# Patient Record
Sex: FEMALE | Race: BLACK OR AFRICAN AMERICAN | Employment: FULL TIME | URBAN - METROPOLITAN AREA
[De-identification: names, ages, dates, MRNs, and addresses within clinical notes are randomized per-mention and may not be internally consistent; named-entity substitution may affect disease eponyms.]

---

## 2017-01-12 ENCOUNTER — APPOINTMENT (OUTPATIENT)
Dept: GENERAL RADIOLOGY | Age: 47
End: 2017-01-12
Attending: EMERGENCY MEDICINE
Payer: COMMERCIAL

## 2017-01-12 ENCOUNTER — HOSPITAL ENCOUNTER (EMERGENCY)
Age: 47
Discharge: HOME | End: 2017-01-12
Attending: EMERGENCY MEDICINE
Payer: COMMERCIAL

## 2017-01-12 VITALS
TEMPERATURE: 98.3 F | HEART RATE: 89 BPM | DIASTOLIC BLOOD PRESSURE: 51 MMHG | BODY MASS INDEX: 31.83 KG/M2 | WEIGHT: 173 LBS | RESPIRATION RATE: 18 BRPM | OXYGEN SATURATION: 100 % | HEIGHT: 62 IN | SYSTOLIC BLOOD PRESSURE: 132 MMHG

## 2017-01-12 DIAGNOSIS — R05.8 COUGH PRODUCTIVE OF PURULENT SPUTUM: Primary | ICD-10-CM

## 2017-01-12 DIAGNOSIS — G80.9 CEREBRAL PALSY, UNSPECIFIED TYPE (HCC): ICD-10-CM

## 2017-01-12 PROCEDURE — 99282 EMERGENCY DEPT VISIT SF MDM: CPT

## 2017-01-12 PROCEDURE — 94640 AIRWAY INHALATION TREATMENT: CPT

## 2017-01-12 PROCEDURE — 71020 XR CHEST PA LAT: CPT

## 2017-01-12 RX ORDER — GUAIFENESIN 100 MG/5ML
200 SOLUTION ORAL
Qty: 118 ML | Refills: 0 | Status: SHIPPED | OUTPATIENT
Start: 2017-01-12 | End: 2017-06-27

## 2017-01-12 RX ORDER — ALBUTEROL SULFATE 90 UG/1
1-2 AEROSOL, METERED RESPIRATORY (INHALATION)
Qty: 1 INHALER | Refills: 1 | Status: SHIPPED | OUTPATIENT
Start: 2017-01-12

## 2017-01-12 RX ORDER — ALBUTEROL SULFATE 0.83 MG/ML
2.5 SOLUTION RESPIRATORY (INHALATION)
Status: DISCONTINUED | OUTPATIENT
Start: 2017-01-12 | End: 2017-01-12 | Stop reason: HOSPADM

## 2017-01-12 RX ORDER — AZITHROMYCIN 250 MG/1
TABLET, FILM COATED ORAL
Qty: 6 TAB | Refills: 0 | Status: SHIPPED | OUTPATIENT
Start: 2017-01-12 | End: 2017-01-17 | Stop reason: ALTCHOICE

## 2017-01-12 NOTE — ED NOTES
Pt c/o wheezing and + productive cough x 3 days SOB started  Yesterday. Non smoker and dx with bronchitis /asthma + use of inhaler. Pt is out of meds. Emergency Department Nursing Plan of Care       The Nursing Plan of Care is developed from the Nursing assessment and Emergency Department Attending provider initial evaluation. The plan of care may be reviewed in the ED Provider note.     The Plan of Care was developed with the following considerations:   Patient / Family readiness to learn indicated by:verbalized understanding  Persons(s) to be included in education: patient  Barriers to Learning/Limitations:No    Signed     Noel Ordonez RN    1/12/2017   8:00 AM          .

## 2017-01-12 NOTE — ED NOTES
Patient (s)  given copy of dc instructions and 0 paper script(s) and 3 electronic scripts. Patient (s)  verbalized understanding of instructions and script (s). Patient given a current medication reconciliation form and verbalized understanding of their medications. Patient (s) verbalized understanding of the importance of discussing medications with  his or her physician or clinic they will be following up with. Patient alert and oriented and in no acute distress. Patient discharged home ambulatory.

## 2017-01-12 NOTE — ED PROVIDER NOTES
HPI Comments: Darling Villatoro is a 42 y.o. female with history of asthma who presents ambulatory to ED c/o productive cough with yellow sputum for the past 3 days, along with associated wheezing, shortness of breath, and moderate rib pain secondary to cough and deep inspiration. Pt states she used her inhaler at 2:00 AM today, but she does not have a nebulizer machine. Pt additionally c/o mild headache. Pt denies any fever, chills, N/V/D, or sore throat. PCP:  [address] [place], [place]    There are no other complaints, changes or physical findings at this time. Written by Lakisha Piage. Juni Townsend ED Scribe, as dictated by Nasrin Manrique. Shanna Rosario MD.    The history is provided by the patient. No  was used.         Past Medical History:   Diagnosis Date    Asthma 4/29/2010    Bronchitis     CAD (coronary artery disease)      Sinus Tach    Cerebral palsy ([address] 4/29/2010    [facility]     Endocrine disease      Thyroid goiter    Goiter     Hypertension     Lumbar disc herniation 4/29/2010    Migraine     Neurological disorder      cerebral palsy    Other ill-defined conditions(799.89)      pleurisy    Other ill-defined conditions(799.89)      Migraines    Psychiatric disorder      anxiety    Sinus tachycardia 5/23/2014    Thyroid disease        Past Surgical History:   Procedure Laterality Date    Hx orthopaedic       carpal tunnel release    Hx orthopaedic       tendon repair R hand    Hx orthopaedic       pin in toe of R foot    Pr lap,tubal cautery      Hx heent       T & A    Hx gyn       Tubal ligation         Family History:   Problem Relation Age of Onset    Asthma Mother     Hypertension Mother     Diabetes Mother     Cancer Father      prostate    Asthma Daughter     Asthma Daughter     Stroke Maternal Grandfather     Cancer Paternal Grandmother      throat       Social History     Social History    Marital status: SINGLE     Spouse name: N/A   [facility] Number of children: N/A    Years of education: N/A     Occupational History    Not on file. Social History Main Topics    Smoking status: Never Smoker    Smokeless tobacco: Never Used    Alcohol use No    Drug use: No    Sexual activity: Yes     Partners: Male     Birth control/ protection: None     Other Topics Concern    Not on file     Social History Narrative         ALLERGIES: Pcn [penicillins]    Review of Systems   Constitutional: Negative for activity change, chills and fever. HENT: Negative for sore throat. Eyes: Negative for pain and redness. Respiratory: Positive for cough, shortness of breath and wheezing. Negative for chest tightness. Cardiovascular: Negative for chest pain and palpitations. Gastrointestinal: Negative for abdominal pain, diarrhea, nausea and vomiting. Genitourinary: Negative for dysuria, frequency and urgency. Musculoskeletal: Negative for back pain and neck pain. Skin: Negative for rash. Neurological: Positive for headaches. Negative for syncope and light-headedness. Psychiatric/Behavioral: Negative for confusion. All other systems reviewed and are negative. Vitals:    01/12/17 0802   BP: 125/86   Pulse: 88   Resp: 20   Temp: 98.4 °F (36.9 °C)   SpO2: 100%   Weight: 78.5 kg (173 lb)   Height: 5' 1.5\" (1.562 m)            Physical Exam   Nursing note and vitals reviewed.    Physical Examination: General appearance - WDWN, in no apparent distress  Head - NC/AT  Eyes - pupils equal, round  and reactive, extraocular eye movements intact, conj/sclera clear, anicteric  Mouth - mucous membranes moist, pharynx normal without lesions  Nose/Ears - nares clear, Tms & canals clear  Neck - supple, no significant adenopathy, trachea midline, no crepitus, c spine diffusely non-tender, no step offs  Chest - Occasional cough, poor air movement   Heart - normal rate and regular rhythm, S1 and S2 normal, no murmurs, gallops, or rubs  Abdomen - soft, nontender, nondistended, nabs, no masses, guarding, rebound or rigidity  Neurological - chronic contracture of right upper extremity , alert, oriented, normal speech, cranial nerves intact, no focal motor findings, motor & sensory diffusely intact, normal gait  Extremities/MS - peripheral pulses normal, no pedal edema, all joints atraumatic, FROM, non-tender, no gross deformities, spine diffusely non-tender  Skin - normal coloration and turgor, no rashes, no lesions or lacerations      MDM  Number of Diagnoses or Management Options  Diagnosis management comments: DDx: pneumonia, asthma, bronchitis        Amount and/or Complexity of Data Reviewed  Tests in the radiology section of CPT®: reviewed and ordered  Review and summarize past medical records: yes    Patient Progress  Patient progress: improved    ED Course       Procedures    Progress Note  9:35 AM    Kika hSah MD has re-evaluated pt, and pt's air movement has improved after breathing treatment. IMAGING RESULTS:  CXR Results  (Last 48 hours)               01/12/17 0929  XR CHEST PA LAT Final result    Impression:  Impression:   No acute cardiopulmonary process. Narrative:  Chest PA and lateral       History: Cough. Wheezing. Chest pain and cough       Comparison: 1/6/2015       Findings: The lungs are well expanded. No focal consolidation, pleural   effusion, or pneumothorax. The cardiomediastinal silhouette is unremarkable. The visualized osseous structures are unremarkable. MEDICATIONS GIVEN:  Medications   albuterol (PROVENTIL VENTOLIN) nebulizer solution 2.5 mg (not administered)       IMPRESSION:  1. Cough productive of purulent sputum    2. [facility]        PLAN:  1.    Current Discharge Medication List      START taking these medications    Details   azithromycin (ZITHROMAX Z-ANGIE) 250 mg tablet 5 day pack  Qty: 6 Tab, Refills: 0      guaiFENesin (ROBITUSSIN) 100 mg/5 mL liquid Take 10 mL by mouth three (3) times daily as needed for Cough. Qty: 118 mL, Refills: 0         CONTINUE these medications which have CHANGED    Details   albuterol (PROVENTIL HFA, VENTOLIN HFA, PROAIR HFA) 90 mcg/actuation inhaler Take 1-2 Puffs by inhalation every four (4) hours as needed for Wheezing. Qty: 1 Inhaler, Refills: 1         CONTINUE these medications which have NOT CHANGED    Details   baclofen (LIORESAL) 10 mg tablet Take 1 tablet by mouth daily as needed for Pain. Qty: 30 tablet, Refills: 0    Associated Diagnoses: Medication refill      gabapentin (NEURONTIN) 400 mg capsule Take 1 capsule by mouth three (3) times daily. Qty: 90 capsule, Refills: 0    Associated Diagnoses: Medication refill      topiramate (TOPAMAX) 50 mg tablet Take 1 tablet by mouth daily. Qty: 30 tablet, Refills: 3    Associated Diagnoses: Medication refill      metoprolol (LOPRESSOR) 25 mg tablet 1/2 tab twice a day  Qty: 30 Tab, Refills: 1    Associated Diagnoses: Sinus tachycardia; Medication refill      ranitidine (ZANTAC) 150 mg tablet Take 1 Tab by mouth two (2) times a day. Qty: 60 Tab, Refills: 1      Ferrous Sulfate (FLOW FE) 47.5 mg iron TbER tablet Take 1 Tab by mouth daily. Take with vitamin C  Qty: 30 Tab, Refills: 1      naproxen (NAPROSYN) 500 mg tablet Take 1 tablet by mouth two (2) times daily (with meals). Qty: 60 tablet, Refills: 0      norethindrone (MICRONOR) 0.35 mg tablet Take 1 tablet by mouth daily. Qty: 1 Package, Refills: 12    Associated Diagnoses: Medication refill      ALPRAZolam (XANAX) 0.5 mg tablet Take 1 tablet by mouth daily as needed for Anxiety. Qty: 30 tablet, Refills: 0    Associated Diagnoses: Anxiety      Nebulizer & Compressor machine Use as directed  Qty: 1 each, Refills: 0      Nebulizer Accessories kit Mask and tubing  Qty: 1 kit, Refills: 0      predniSONE (STERAPRED DS) 10 mg dose pack Take 1 tablet by mouth See Admin Instructions.  See administration instruction per 10mg dose pack  Qty: 21 tablet, Refills: 0      SUMAtriptan (IMITREX) 100 mg tablet Take 1 Tab by mouth once as needed for Migraine. Qty: 10 Tab, Refills: 4    Associated Diagnoses: Migraine headache           2. Follow-up Information     Follow up With Details Comments MD Nereida Schedule an appointment as soon as possible for a visit As needed [id number] [id number] [facility] EMERGENCY DEPT  As needed, If symptoms worsen [facility]  [phone]        Return to ED if worse       DISCHARGE NOTE  9:38 AM  The patient has been re-evaluated and is ready for discharge. Reviewed available results with patient. Counseled pt on diagnosis and care plan. Pt has expressed understanding, and all questions have been answered. Pt agrees with plan and agrees to follow up as recommended, or return to the ED if their symptoms worsen. Discharge instructions have been provided and explained to the pt, along with reasons to return to the ED. Attestations: This note is prepared by Conchis Nieto. Carmelita Caldera, acting as Scribe for [address]. [place] [id number]. Lee Sanders MD: The scribe's documentation has been prepared under my direction and personally reviewed by me in its entirety. I confirm that the note above accurately reflects all work, treatment, procedures, and medical decision making performed by me.

## 2017-01-12 NOTE — LETTER
[facility] EMERGENCY DEPT 
[address] [place] [address] [address] [id number] [phone] Work/School Note Date: 1/12/2017 To Whom It May concern: 
 
Baron Cadena was seen and treated today in the emergency room by the following provider(s): 
Attending Provider: Orlando Zuñiga MD. Baron Cadena may return to work on 1/14/17. Sincerely, Kika Chiang MD

## 2017-01-12 NOTE — DISCHARGE INSTRUCTIONS
Cough: Care Instructions  Your Care Instructions  A cough is your body's response to something that bothers your throat or airways. Many things can cause a cough. You might cough because of a cold or the flu, bronchitis, or asthma. Smoking, postnasal drip, allergies, and stomach acid that backs up into your throat also can cause coughs. A cough is a symptom, not a disease. Most coughs stop when the cause, such as a cold, goes away. You can take a few steps at home to cough less and feel better. Follow-up care is a key part of your treatment and safety. Be sure to make and go to all appointments, and call your doctor if you are having problems. It's also a good idea to know your test results and keep a list of the medicines you take. How can you care for yourself at home? · Drink lots of water and other fluids. This helps thin the mucus and soothes a dry or sore throat. Honey or lemon juice in hot water or tea may ease a dry cough. · Take cough medicine as directed by your doctor. · Prop up your head on pillows to help you breathe and ease a dry cough. · Try cough drops to soothe a dry or sore throat. Cough drops don't stop a cough. Medicine-flavored cough drops are no better than candy-flavored drops or hard candy. · Do not smoke. Avoid secondhand smoke. If you need help quitting, talk to your doctor about stop-smoking programs and medicines. These can increase your chances of quitting for good. When should you call for help? Call 911 anytime you think you may need emergency care. For example, call if:  · You have severe trouble breathing. Call your doctor now or seek immediate medical care if:  · You cough up blood. · You have new or worse trouble breathing. · You have a new or higher fever. · You have a new rash.   Watch closely for changes in your health, and be sure to contact your doctor if:  · You cough more deeply or more often, especially if you notice more mucus or a change in the color of your mucus. · You have new symptoms, such as a sore throat, an earache, or sinus pain. · You do not get better as expected. Where can you learn more? Go to http://fern-lidia.info/. Enter D279 in the search box to learn more about \"Cough: Care Instructions. \"  Current as of: May 27, 2016  Content Version: 11.1  © 2006-2016 [employer]. Care instructions adapted under license by [employer] (which disclaims liability or warranty for this information). If you have questions about a medical condition or this instruction, always ask your healthcare professional. [place] [address] any warranty or liability for your use of this information. Bronchitis: Care Instructions  Your Care Instructions    Bronchitis is inflammation of the bronchial tubes, which carry air to the lungs. The tubes swell and produce mucus, or phlegm. The mucus and inflamed bronchial tubes make you cough. You may have trouble breathing. Most cases of bronchitis are caused by viruses like those that cause colds. Antibiotics usually do not help and they may be harmful. Bronchitis usually develops rapidly and lasts about 2 to 3 weeks in otherwise healthy people. Follow-up care is a key part of your treatment and safety. Be sure to make and go to all appointments, and call your doctor if you are having problems. It's also a good idea to know your test results and keep a list of the medicines you take. How can you care for yourself at home? · Take all medicines exactly as prescribed. Call your doctor if you think you are having a problem with your medicine. · Get some extra rest.  · Take an over-the-counter pain medicine, such as acetaminophen (Tylenol), ibuprofen (Advil, Motrin), or naproxen (Aleve) to reduce fever and relieve body aches. Read and follow all instructions on the label. · Do not take two or more pain medicines at the same time unless the doctor told you to.  Many pain medicines have acetaminophen, which is Tylenol. Too much acetaminophen (Tylenol) can be harmful. · Take an over-the-counter cough medicine that contains dextromethorphan to help quiet a dry, hacking cough so that you can sleep. Avoid cough medicines that have more than one active ingredient. Read and follow all instructions on the label. · Breathe moist air from a humidifier, hot shower, or sink filled with hot water. The heat and moisture will thin mucus so you can cough it out. · Do not smoke. Smoking can make bronchitis worse. If you need help quitting, talk to your doctor about stop-smoking programs and medicines. These can increase your chances of quitting for good. When should you call for help? Call 911 anytime you think you may need emergency care. For example, call if:  · You have severe trouble breathing. Call your doctor now or seek immediate medical care if:  · You have new or worse trouble breathing. · You cough up dark brown or bloody mucus (sputum). · You have a new or higher fever. · You have a new rash. Watch closely for changes in your health, and be sure to contact your doctor if:  · You cough more deeply or more often, especially if you notice more mucus or a change in the color of your mucus. · You are not getting better as expected. Where can you learn more? Go to http://fern-lidia.info/. Enter H333 in the search box to learn more about \"Bronchitis: Care Instructions. \"  Current as of: May 23, 2016  Content Version: 11.1  © 2451-5485 [employer]. Care instructions adapted under license by [employer] (which disclaims liability or warranty for this information). If you have questions about a medical condition or this instruction, always ask your healthcare professional. [address] any warranty or liability for your use of this information.

## 2017-01-13 ENCOUNTER — PATIENT OUTREACH (OUTPATIENT)
Dept: INTERNAL MEDICINE CLINIC | Age: 47
End: 2017-01-13

## 2017-01-13 NOTE — PROGRESS NOTES
Patient listed on discharge [facility]) report on 2017. Patient discharged from [facility] ED for Productive Cough. Contacted patient to perform post ED discharge assessment. Verified  and address with patient as identifiers. Provided introduction to self, and explanation of the Panel Manager role. Performed medication reconciliation with patient, and patient verbalizes understanding of administration of home medications. Reviewed discharge instructions with patient. Patient verbalizes understand of discharge instructions and follow-up care. Patient scheduled to f/u with Dr. Aamir Ramírez on 2017 @ 3:45 PM.  Reviewed red flags with patient, and patient verbalizes understanding. Patient given an opportunity to ask questions. No other clinical/social/functional needs noted. The patient agrees to contact the PCP office for questions related to her healthcare. The patient expressed thanks, offered no additional questions and ended the call. Red Flags:Increased SOB, Increased Coughing  Spoke with patient and she stated she was doing better. Patient stated she still has a productive cough with thick yellow phlegm. Patient c/o diarrhea due to Z-pack, she has occasional SOB. Patient is self care but has a supportive family. Patient does not monitor B/P at home. Patient tolerating regular diet with occasional added sodium after cooking. Patient has a Neuro appointment scheduled for 2014 @ [facility] [place] [facility]. Patient informed she will be called again in 2-3 weeks. Will continue to follow.

## 2017-01-17 ENCOUNTER — OFFICE VISIT (OUTPATIENT)
Dept: INTERNAL MEDICINE CLINIC | Age: 47
End: 2017-01-17

## 2017-01-17 VITALS
DIASTOLIC BLOOD PRESSURE: 65 MMHG | OXYGEN SATURATION: 100 % | RESPIRATION RATE: 16 BRPM | HEIGHT: 62 IN | WEIGHT: 172.2 LBS | TEMPERATURE: 96.9 F | BODY MASS INDEX: 31.69 KG/M2 | HEART RATE: 83 BPM | SYSTOLIC BLOOD PRESSURE: 123 MMHG

## 2017-01-17 DIAGNOSIS — R20.2 NUMBNESS AND TINGLING OF BOTH LEGS: ICD-10-CM

## 2017-01-17 DIAGNOSIS — J45.21 MILD INTERMITTENT ASTHMA WITH ACUTE EXACERBATION: Primary | ICD-10-CM

## 2017-01-17 DIAGNOSIS — R07.89 CHEST DISCOMFORT: ICD-10-CM

## 2017-01-17 DIAGNOSIS — J40 BRONCHITIS: ICD-10-CM

## 2017-01-17 DIAGNOSIS — Z76.0 MEDICATION REFILL: ICD-10-CM

## 2017-01-17 DIAGNOSIS — R20.0 NUMBNESS AND TINGLING OF BOTH LEGS: ICD-10-CM

## 2017-01-17 DIAGNOSIS — R05.8 COUGH PRODUCTIVE OF CLEAR SPUTUM: ICD-10-CM

## 2017-01-17 RX ORDER — PREDNISONE 10 MG/1
10 TABLET ORAL SEE ADMIN INSTRUCTIONS
Qty: 21 TAB | Refills: 0 | Status: SHIPPED | OUTPATIENT
Start: 2017-01-17 | End: 2017-02-08 | Stop reason: ALTCHOICE

## 2017-01-17 RX ORDER — ALBUTEROL SULFATE 0.83 MG/ML
2.5 SOLUTION RESPIRATORY (INHALATION) ONCE
Qty: 1 EACH | Refills: 0
Start: 2017-01-17 | End: 2017-01-17

## 2017-01-17 RX ORDER — ALBUTEROL SULFATE 0.83 MG/ML
2.5 SOLUTION RESPIRATORY (INHALATION)
Qty: 50 EACH | Refills: 0
Start: 2017-01-17 | End: 2017-02-21 | Stop reason: SDUPTHER

## 2017-01-17 RX ORDER — METOPROLOL TARTRATE 25 MG/1
TABLET, FILM COATED ORAL
Qty: 30 TAB | Refills: 1 | Status: SHIPPED | OUTPATIENT
Start: 2017-01-17 | End: 2017-02-08 | Stop reason: SDUPTHER

## 2017-01-17 RX ORDER — BACLOFEN 10 MG/1
10 TABLET ORAL
Qty: 30 TAB | Refills: 0 | Status: SHIPPED | OUTPATIENT
Start: 2017-01-17 | End: 2017-02-08 | Stop reason: SDUPTHER

## 2017-01-17 RX ORDER — TOPIRAMATE 50 MG/1
50 TABLET, FILM COATED ORAL DAILY
Qty: 30 TAB | Refills: 3 | Status: SHIPPED | OUTPATIENT
Start: 2017-01-17

## 2017-01-17 RX ORDER — SUMATRIPTAN 100 MG/1
100 TABLET, FILM COATED ORAL
Qty: 10 TAB | Refills: 4 | Status: SHIPPED | OUTPATIENT
Start: 2017-01-17

## 2017-01-17 RX ORDER — NEBULIZER AND COMPRESSOR
EACH MISCELLANEOUS
Qty: 1 EACH | Refills: 0 | Status: SHIPPED | OUTPATIENT
Start: 2017-01-17

## 2017-01-17 NOTE — PROGRESS NOTES
Chief Complaint   Patient presents with   [facility] Follow Up     follow up from [facility] [place] for bronchitis. Subjective:   Justin Burgos 42 y.o.  female with a  past medical history reviewed see below. Here for a flu vaccine she was treated with z pac for bronchitis she was also given an inhlaer   She has had some peripheral neuropathy dx at the ER an had thyroids labs done  And had an repeat U/S    ROS: otherwise feeling generally well. All other systems reviewed and are negative      Current Outpatient Prescriptions   Medication Sig Dispense Refill    albuterol (PROVENTIL HFA, VENTOLIN HFA, PROAIR HFA) 90 mcg/actuation inhaler Take 1-2 Puffs by inhalation every four (4) hours as needed for Wheezing. 1 Inhaler 1    guaiFENesin (ROBITUSSIN) 100 mg/5 mL liquid Take 10 mL by mouth three (3) times daily as needed for Cough. 118 mL 0    metoprolol (LOPRESSOR) 25 mg tablet 1/2 tab twice a day 30 Tab 1    ranitidine (ZANTAC) 150 mg tablet Take 1 Tab by mouth two (2) times a day. 60 Tab 1    naproxen (NAPROSYN) 500 mg tablet Take 1 tablet by mouth two (2) times daily (with meals). 60 tablet 0    baclofen (LIORESAL) 10 mg tablet Take 1 tablet by mouth daily as needed for Pain. 30 tablet 0    gabapentin (NEURONTIN) 400 mg capsule Take 1 capsule by mouth three (3) times daily. 90 capsule 0    topiramate (TOPAMAX) 50 mg tablet Take 1 tablet by mouth daily. 30 tablet 3    Nebulizer & Compressor machine Use as directed 1 each 0    Nebulizer Accessories kit Mask and tubing 1 kit 0    Ferrous Sulfate (FLOW FE) 47.5 mg iron TbER tablet Take 1 Tab by mouth daily. Take with vitamin C 30 Tab 1    norethindrone (MICRONOR) 0.35 mg tablet Take 1 tablet by mouth daily. 1 Package 12    ALPRAZolam (XANAX) 0.5 mg tablet Take 1 tablet by mouth daily as needed for Anxiety. 30 tablet 0    SUMAtriptan (IMITREX) 100 mg tablet Take 1 Tab by mouth once as needed for Migraine.  10 Tab 4     Allergies   Allergen Reactions  Pcn [Penicillins] Hives     Swelling in hands      Past Medical History   Diagnosis Date    Asthma 4/29/2010    Bronchitis     CAD (coronary artery disease)      Sinus Tach    Cerebral palsy ([address] 4/29/2010    CP [facility]     Endocrine disease      Thyroid goiter    Goiter     Hypertension     Lumbar disc herniation 4/29/2010    Migraine     Neurological disorder      cerebral palsy    Other ill-defined conditions(799.89)      pleurisy    Other ill-defined conditions(799.89)      Migraines    Psychiatric disorder      anxiety    Sinus tachycardia 5/23/2014    Thyroid disease      Past Surgical History   Procedure Laterality Date    Hx orthopaedic       carpal tunnel release    Hx orthopaedic       tendon repair R hand    Hx orthopaedic       pin in toe of R foot    Pr lap,tubal cautery      Hx heent       T & A    Hx gyn       Tubal ligation, total hyterectomy     Family History   Problem Relation Age of Onset    Asthma Mother     Hypertension Mother     Diabetes Mother     Cancer Father      prostate    Asthma Daughter     Asthma Daughter     Stroke Maternal Grandfather     Cancer Paternal Grandmother      throat     Social History   Substance Use Topics    Smoking status: Never Smoker    Smokeless tobacco: Never Used    Alcohol use No          Objective:     Visit Vitals    /65 (BP 1 Location: Left arm, BP Patient Position: At rest)    Pulse 83    Temp 96.9 °F (36.1 °C) (Oral)    Resp 16    Ht 5' 1.5\" (1.562 m)    Wt 172 lb 3.2 oz (78.1 kg)    LMP 02/20/2015    SpO2 100%    BMI 32.01 kg/m2     Gen: NAD, pleasant  HEENT: normal appearing head, nares patent, PERRLA, EOMI, oropharynx no erythema, no cervical lymphadenopathy neck supple   Cardio: RRR nl S1S2 no murmur  Lungs no secondary muscle use + faint wheeze after neb improved  no rales no rhonchi  ABD Soft non tender non distended + bowel sounds  Extremities: full ROM X 4 no clubbing no cyanosis  Neuro: no gross focal deficits noted, alert and orientated X 3  Psych.: well groomed no outward signs of depression. Assessment/Plan:   Wyatt Nyhan was seen today for hospital follow up. Diagnoses and all orders for this visit:    Mild intermittent asthma with acute exacerbation    Bronchitis  -     albuterol (PROVENTIL VENTOLIN) 2.5 mg /3 mL (0.083 %) nebulizer solution; 3 mL by Nebulization route once for 1 dose. -     Cancel: ALBUTEROL, INHAL. ALL.()  -     Cancel: INHAL RX, AIRWAY OBST/DX SPUTUM INDUCT ([id number]); Future  -     albuterol (PROVENTIL VENTOLIN) 2.5 mg /3 mL (0.083 %) nebulizer solution; 3 mL by Nebulization route once for 1 dose. -     ALBUTEROL, INHAL. JOSE L.()  -     INHAL RX, AIRWAY OBST/DX SPUTUM INDUCT ([id number])    Medication refill  -     metoprolol tartrate (LOPRESSOR) 25 mg tablet; 1/2 tab twice a day  -     baclofen (LIORESAL) 10 mg tablet; Take 1 Tab by mouth daily as needed for Pain. -     topiramate (TOPAMAX) 50 mg tablet; Take 1 Tab by mouth daily. Cough productive of clear sputum  -     albuterol (PROVENTIL VENTOLIN) 2.5 mg /3 mL (0.083 %) nebulizer solution; 3 mL by Nebulization route once for 1 dose. -     Cancel: ALBUTEROL, INHAL. HLX.()  -     Cancel: INHAL RX, AIRWAY OBST/DX SPUTUM INDUCT ([id number]); Future    Chest discomfort  -     albuterol (PROVENTIL VENTOLIN) 2.5 mg /3 mL (0.083 %) nebulizer solution; 3 mL by Nebulization route once for 1 dose. -     Cancel: ALBUTEROL, INHAL. QMO.()  -     Cancel: INHAL RX, AIRWAY OBST/DX SPUTUM INDUCT ([id number]); Future    Numbness and tingling of both legs  -     HEMOGLOBIN A1C WITH EAG; Future  -     METABOLIC PANEL, COMPREHENSIVE; Future  -     CBC WITH AUTOMATED DIFF; Future  -     VITAMIN D, 25 HYDROXY; Future  -     LIPID PANEL; Future  -     URIC ACID; Future    Other orders  -     SUMAtriptan (IMITREX) 100 mg tablet;  Take 1 Tab by mouth once as needed for Migraine.  -     albuterol sulfate (Tereza Jeronimo) 80 mcg/actuation aepb; Take 2 Puffs by inhalation every four (4) hours. -     Nebulizer & Compressor machine; Mask and tubing  -     albuterol (PROVENTIL VENTOLIN) 2.5 mg /3 mL (0.083 %) nebulizer solution; 3 mL by Nebulization route every four (4) hours as needed for Wheezing.  -     predniSONE (STERAPRED DS) 10 mg dose pack; Take 1 Tab by mouth See Admin Instructions. See administration instruction per 10mg dose pack      Follow-up Disposition:  Return in about 3 weeks (around 2/7/2017) for review labs and f/up flu shot? ..  avs printed and given to the pt. .  predinsone for cough and will oerder nebulizer as well pt to return with thryoid labs  nd will get labs on lab [facility] as our lab is closed   The patient voiced understanding of the above. Medication side effects were reviewed with the patient. Call with any concerns.

## 2017-01-17 NOTE — PROGRESS NOTES
1. Have you been to the ER, urgent care clinic since your last visit? Hospitalized since your last visit? See below    2. Have you seen or consulted any other health care providers outside of the [address] [place] [address] since your last visit? Include any pap smears or colon screening. No     Chief Complaint   Patient presents with   [facility] Follow Up     follow up from [facility] [place] [facility] [place] for bronchitis. Not fasting    Was diagnosed with peripheral neuropathy back in September at [facility].

## 2017-01-17 NOTE — MR AVS SNAPSHOT
Visit Information Date & Time Provider Department Dept. Phone Encounter #  
 1/17/2017  3:45  [facility] [place], [facility] [phone] [id number] Follow-up Instructions Return in about 3 weeks (around 2/7/2017) for review labs and f/up flu shot? [place] Upcoming Health Maintenance Date Due Pneumococcal 19-64 Medium Risk (1 of 1 - PPSV23) 12/30/1991 DTaP/Tdap/Td series (1 - Tdap) 12/30/1993 PAP AKA CERVICAL CYTOLOGY 7/22/2017 Allergies as of 1/17/2017  Review Complete On: 1/17/2017 By: Timmy Tao LPN Severity Noted Reaction Type Reactions Pcn [Penicillins]  04/29/2010    Hives Swelling in hands Current Immunizations  Reviewed on 10/3/2014 Name Date Influenza Vaccine 10/3/2014 Not reviewed this visit You Were Diagnosed With   
  
 Codes Comments Mild intermittent asthma with acute exacerbation    -  Primary ICD-10-CM: J45.21 ICD-9-CM: 812.07 Bronchitis     ICD-10-CM: J40 ICD-9-CM: 537 Medication refill     ICD-10-CM: Z76.0 ICD-9-CM: V68.1 Cough productive of clear sputum     ICD-10-CM: R05 ICD-9-CM: 004. 2 Chest discomfort     ICD-10-CM: R07.89 ICD-9-CM: 786.59 Numbness and tingling of both legs     ICD-10-CM: R20.2 ICD-9-CM: 801. 0 Vitals BP Pulse Temp Resp Height(growth percentile) Weight(growth percentile) 123/65 (BP 1 Location: Left arm, BP Patient Position: At rest) 83 96.9 °F (36.1 °C) (Oral) 16 5' 1.5\" (1.562 m) 172 lb 3.2 oz (78.1 kg) LMP SpO2 BMI OB Status Smoking Status 02/20/2015 100% 32.01 kg/m2 Hysterectomy Never Smoker BMI and BSA Data Body Mass Index Body Surface Area 32.01 kg/m 2 1.84 m 2 Preferred Pharmacy Pharmacy Name [email or web address], [address] [phone] Your Updated Medication List  
  
   
 This list is accurate as of: 1/17/17  4:29 PM.  Always use your most recent med list.  
  
  
  
  
 * albuterol 90 mcg/actuation inhaler Commonly known as:  PROVENTIL HFA, VENTOLIN HFA, PROAIR HFA Take 1-2 Puffs by inhalation every four (4) hours as needed for Wheezing. * albuterol sulfate 90 mcg/actuation Aepb Commonly known as:  Gerold Ariana Take 2 Puffs by inhalation every four (4) hours. * albuterol 2.5 mg /3 mL (0.083 %) nebulizer solution Commonly known as:  PROVENTIL VENTOLIN  
3 mL by Nebulization route once for 1 dose. * albuterol 2.5 mg /3 mL (0.083 %) nebulizer solution Commonly known as:  PROVENTIL VENTOLIN  
3 mL by Nebulization route once for 1 dose. * albuterol 2.5 mg /3 mL (0.083 %) nebulizer solution Commonly known as:  PROVENTIL VENTOLIN  
3 mL by Nebulization route every four (4) hours as needed for Wheezing. ALPRAZolam 0.5 mg tablet Commonly known as:  Hemal Nano Take 1 tablet by mouth daily as needed for Anxiety. baclofen 10 mg tablet Commonly known as:  LIORESAL Take 1 Tab by mouth daily as needed for Pain. Ferrous Sulfate 47.5 mg iron Tber tablet Commonly known as:  SLOW FE Take 1 Tab by mouth daily. Take with vitamin C  
  
 gabapentin 400 mg capsule Commonly known as:  NEURONTIN Take 1 capsule by mouth three (3) times daily. guaiFENesin 100 mg/5 mL liquid Commonly known as:  ROBITUSSIN Take 10 mL by mouth three (3) times daily as needed for Cough. metoprolol tartrate 25 mg tablet Commonly known as:  LOPRESSOR  
1/2 tab twice a day  
  
 naproxen 500 mg tablet Commonly known as:  NAPROSYN Take 1 tablet by mouth two (2) times daily (with meals). * Nebulizer & Compressor machine Use as directed * Nebulizer & Compressor machine Mask and tubing Nebulizer Accessories Kit Mask and tubing  
  
 norethindrone 0.35 mg Tab Commonly known as:  Johan & Johan  
 Take 1 tablet by mouth daily. predniSONE 10 mg dose pack Commonly known as:  STERAPRED DS Take 1 Tab by mouth See Admin Instructions. See administration instruction per 10mg dose pack  
  
 raNITIdine 150 mg tablet Commonly known as:  ZANTAC Take 1 Tab by mouth two (2) times a day. SUMAtriptan 100 mg tablet Commonly known as:  IMITREX Take 1 Tab by mouth once as needed for Migraine. topiramate 50 mg tablet Commonly known as:  TOPAMAX Take 1 Tab by mouth daily. * Notice: This list has 7 medication(s) that are the same as other medications prescribed for you. Read the directions carefully, and ask your doctor or other care provider to review them with you. Prescriptions Printed Refills  
 albuterol sulfate (PROAIR RESPICLICK) 90 mcg/actuation aepb 0 Sig: Take 2 Puffs by inhalation every four (4) hours. Class: Print Route: Inhalation Nebulizer & Compressor machine 0 Sig: Mask and tubing Class: Print Prescriptions Sent to Pharmacy Refills SUMAtriptan (IMITREX) 100 mg tablet 4 Sig: Take 1 Tab by mouth once as needed for Migraine. Class: Normal  
 Pharmacy: [email or web address] [address] Ph #: [phone] Route: Oral  
 metoprolol tartrate (LOPRESSOR) 25 mg tablet 1 Si/2 tab twice a day Class: Normal  
 Pharmacy: [email or web address] [address] Ph #: [phone]  
 baclofen (LIORESAL) 10 mg tablet 0 Sig: Take 1 Tab by mouth daily as needed for Pain. Class: Normal  
 Pharmacy: [email or web address] [address] Ph #: [phone] Route: Oral  
 topiramate (TOPAMAX) 50 mg tablet 3 Sig: Take 1 Tab by mouth daily. Class: Normal  
 Pharmacy: [email or web address] [address] Ph #: [phone]  Route: Oral  
 predniSONE (STERAPRED DS) 10 mg dose pack 0  
 Sig: Take 1 Tab by mouth See Admin Instructions. See administration instruction per 10mg dose pack Class: Normal  
 Pharmacy: [email or web address], [address] Ph #: [phone] Route: Oral  
  
We Performed the Following ALBUTEROL, INHAL. SOL., FDA-APPROVED FINAL, NON-COMPOUND UNIT DOSE, 1 MG [ HCPCS] INHAL RX, AIRWAY OBST/DX SPUTUM INDUCT [id number] CPT(R)] Follow-up Instructions Return in about 3 weeks (around 2/7/2017) for review labs and f/up flu shot? [facility] To-Do List   
 01/17/2017 Lab:  CBC WITH AUTOMATED DIFF   
  
 01/17/2017 Lab:  HEMOGLOBIN A1C WITH EAG   
  
 01/17/2017 Lab:  LIPID PANEL   
  
 01/17/2017 Lab:  METABOLIC PANEL, COMPREHENSIVE   
  
 01/17/2017 Lab:  URIC ACID   
  
 01/17/2017 Lab:  VITAMIN D, 25 HYDROXY Patient Instructions Call with any concerns Please go online and print out a coupon for respiclick Introducing [facility]! Ros Tomlinson introduces [employer] patient portal. Now you can access parts of your medical record, email your doctor's office, and request medication refills online. 1. In your internet browser, go to [email or web address] 2. Click on the First Time User? Click Here link in the Sign In box. You will see the New Member Sign Up page. 3. Enter your [employer] Access Code exactly as it appears below. You will not need to use this code after youve completed the sign-up process. If you do not sign up before the expiration date, you must request a new code. · [employer] Access Code: [email or web address] Expires: 4/12/2017  7:51 AM 
 
4. Enter the last four digits of your Social Security Number (xxxx) and Date of Birth (mm/dd/yyyy) as indicated and click Submit. You will be taken to the next sign-up page. 5. Create a [employer] ID. This will be your [employer] login ID and cannot be changed, so think of one that is secure and easy to remember. 6. Create a [employer] password. You can change your password at any time. 7. Enter your Password Reset Question and Answer. This can be used at a later time if you forget your password. 8. Enter your e-mail address. You will receive e-mail notification when new information is available in [address] 9. Click Sign Up. You can now view and download portions of your medical record. 10. Click the Download Summary menu link to download a portable copy of your medical information. If you have questions, please visit the Frequently Asked Questions section of the [employer] website. Remember, [employer] is NOT to be used for urgent needs. For medical emergencies, dial 911. Now available from your iPhone and Android! Please provide this summary of care documentation to your next provider. Your primary care clinician is listed as Paola Lazaro. If you have any questions after today's visit, please call [phone].

## 2017-01-23 LAB
25(OH)D3+25(OH)D2 SERPL-MCNC: 37.7 NG/ML (ref 30–100)
ALBUMIN SERPL-MCNC: 4.4 G/DL (ref 3.5–5.5)
ALBUMIN/GLOB SERPL: 1.4 {RATIO} (ref 1.1–2.5)
ALP SERPL-CCNC: 94 IU/L (ref 39–117)
ALT SERPL-CCNC: 22 IU/L (ref 0–32)
AST SERPL-CCNC: 19 IU/L (ref 0–40)
BASOPHILS # BLD AUTO: 0.1 X10E3/UL (ref 0–0.2)
BASOPHILS NFR BLD AUTO: 1 %
BILIRUB SERPL-MCNC: 0.4 MG/DL (ref 0–1.2)
BUN SERPL-MCNC: 8 MG/DL (ref 6–24)
BUN/CREAT SERPL: 10 (ref 9–23)
CALCIUM SERPL-MCNC: 10 MG/DL (ref 8.7–10.2)
CHLORIDE SERPL-SCNC: 102 MMOL/L (ref 96–106)
CHOLEST SERPL-MCNC: 147 MG/DL (ref 100–199)
CO2 SERPL-SCNC: 22 MMOL/L (ref 18–29)
CREAT SERPL-MCNC: 0.81 MG/DL (ref 0.57–1)
EOSINOPHIL # BLD AUTO: 0.2 X10E3/UL (ref 0–0.4)
EOSINOPHIL NFR BLD AUTO: 3 %
ERYTHROCYTE [DISTWIDTH] IN BLOOD BY AUTOMATED COUNT: 14.7 % (ref 12.3–15.4)
EST. AVERAGE GLUCOSE BLD GHB EST-MCNC: 114 MG/DL
GLOBULIN SER CALC-MCNC: 3.2 G/DL (ref 1.5–4.5)
GLUCOSE SERPL-MCNC: 82 MG/DL (ref 65–99)
HBA1C MFR BLD: 5.6 % (ref 4.8–5.6)
HCT VFR BLD AUTO: 44.9 % (ref 34–46.6)
HDLC SERPL-MCNC: 57 MG/DL
HGB BLD-MCNC: 14.7 G/DL (ref 11.1–15.9)
IMM GRANULOCYTES # BLD: 0 X10E3/UL (ref 0–0.1)
IMM GRANULOCYTES NFR BLD: 0 %
INTERPRETATION, 910389: NORMAL
LDLC SERPL CALC-MCNC: 74 MG/DL (ref 0–99)
LYMPHOCYTES # BLD AUTO: 1.6 X10E3/UL (ref 0.7–3.1)
LYMPHOCYTES NFR BLD AUTO: 31 %
MCH RBC QN AUTO: 26.1 PG (ref 26.6–33)
MCHC RBC AUTO-ENTMCNC: 32.7 G/DL (ref 31.5–35.7)
MCV RBC AUTO: 80 FL (ref 79–97)
MONOCYTES # BLD AUTO: 0.4 X10E3/UL (ref 0.1–0.9)
MONOCYTES NFR BLD AUTO: 8 %
NEUTROPHILS # BLD AUTO: 2.9 X10E3/UL (ref 1.4–7)
NEUTROPHILS NFR BLD AUTO: 57 %
PLATELET # BLD AUTO: 233 X10E3/UL (ref 150–379)
POTASSIUM SERPL-SCNC: 4.5 MMOL/L (ref 3.5–5.2)
PROT SERPL-MCNC: 7.6 G/DL (ref 6–8.5)
RBC # BLD AUTO: 5.64 X10E6/UL (ref 3.77–5.28)
SODIUM SERPL-SCNC: 140 MMOL/L (ref 134–144)
TRIGL SERPL-MCNC: 80 MG/DL (ref 0–149)
URATE SERPL-MCNC: 3.4 MG/DL (ref 2.5–7.1)
VLDLC SERPL CALC-MCNC: 16 MG/DL (ref 5–40)
WBC # BLD AUTO: 5 X10E3/UL (ref 3.4–10.8)

## 2017-02-08 ENCOUNTER — OFFICE VISIT (OUTPATIENT)
Dept: INTERNAL MEDICINE CLINIC | Age: 47
End: 2017-02-08

## 2017-02-08 VITALS
DIASTOLIC BLOOD PRESSURE: 93 MMHG | SYSTOLIC BLOOD PRESSURE: 126 MMHG | WEIGHT: 165 LBS | OXYGEN SATURATION: 99 % | HEIGHT: 62 IN | HEART RATE: 83 BPM | TEMPERATURE: 97.8 F | BODY MASS INDEX: 30.36 KG/M2 | RESPIRATION RATE: 14 BRPM

## 2017-02-08 DIAGNOSIS — R20.2 NUMBNESS AND TINGLING OF BOTH LEGS: ICD-10-CM

## 2017-02-08 DIAGNOSIS — I10 UNCONTROLLED HYPERTENSION: Primary | ICD-10-CM

## 2017-02-08 DIAGNOSIS — M62.838 MUSCLE SPASM: ICD-10-CM

## 2017-02-08 DIAGNOSIS — E04.1 THYROID NODULE: ICD-10-CM

## 2017-02-08 DIAGNOSIS — G43.511 INTRACTABLE PERSISTENT MIGRAINE AURA WITHOUT CEREBRAL INFARCTION AND WITH STATUS MIGRAINOSUS: ICD-10-CM

## 2017-02-08 DIAGNOSIS — R20.0 NUMBNESS AND TINGLING OF BOTH LEGS: ICD-10-CM

## 2017-02-08 DIAGNOSIS — M54.9 CHRONIC BACK PAIN GREATER THAN 3 MONTHS DURATION: ICD-10-CM

## 2017-02-08 DIAGNOSIS — G89.29 CHRONIC BACK PAIN GREATER THAN 3 MONTHS DURATION: ICD-10-CM

## 2017-02-08 DIAGNOSIS — Z23 ENCOUNTER FOR IMMUNIZATION: ICD-10-CM

## 2017-02-08 DIAGNOSIS — Z76.0 MEDICATION REFILL: ICD-10-CM

## 2017-02-08 DIAGNOSIS — Z71.2 ENCOUNTER TO DISCUSS TEST RESULTS: ICD-10-CM

## 2017-02-08 RX ORDER — METOPROLOL TARTRATE 25 MG/1
25 TABLET, FILM COATED ORAL 2 TIMES DAILY
Qty: 60 TAB | Refills: 1 | Status: SHIPPED | OUTPATIENT
Start: 2017-02-08

## 2017-02-08 RX ORDER — GABAPENTIN 600 MG/1
600 TABLET ORAL 3 TIMES DAILY
Qty: 90 TAB | Refills: 0 | Status: SHIPPED | OUTPATIENT
Start: 2017-02-08 | End: 2017-02-21 | Stop reason: SDUPTHER

## 2017-02-08 RX ORDER — BACLOFEN 10 MG/1
10 TABLET ORAL 3 TIMES DAILY
Qty: 90 TAB | Refills: 0 | Status: SHIPPED | OUTPATIENT
Start: 2017-02-08 | End: 2017-02-21 | Stop reason: SDUPTHER

## 2017-02-08 NOTE — MR AVS SNAPSHOT
Visit Information Date & Time Provider Department Dept. Phone Encounter #  
 2/8/2017 11:15  [facility] [place], [address] [address] [place] [id number] Follow-up Instructions Return in about 2 weeks (around 2/21/2017) for recheck bp and review labs 10:45 am please . Upcoming Health Maintenance Date Due Pneumococcal 19-64 Medium Risk (1 of 1 - PPSV23) 12/30/1991 DTaP/Tdap/Td series (1 - Tdap) 12/30/1993 PAP AKA CERVICAL CYTOLOGY 7/22/2017 Allergies as of 2/8/2017  Review Complete On: 2/8/2017 By: Silvano Kingston LPN Severity Noted Reaction Type Reactions Pcn [Penicillins]  04/29/2010    Hives Swelling in hands Current Immunizations  Reviewed on 10/3/2014 Name Date Influenza Vaccine 10/3/2014 Influenza Vaccine Intradermal PF  Incomplete Not reviewed this visit You Were Diagnosed With   
  
 Codes Comments Uncontrolled hypertension    -  Primary ICD-10-CM: I10 
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 0 Thyroid nodule     ICD-10-CM: E04.1 ICD-9-CM: 241.0 Encounter for immunization     ICD-10-CM: R41 ICD-9-CM: V03.89 Vitals BP Pulse Temp Resp Height(growth percentile) Weight(growth percentile) (!) 126/93 (BP 1 Location: Left arm, BP Patient Position: At rest) 83 97.8 °F (36.6 °C) (Oral) 14 5' 1.5\" (1.562 m) 165 lb (74.8 kg) LMP SpO2 BMI OB Status Smoking Status 02/20/2015 99% 30.67 kg/m2 Hysterectomy Never Smoker Vitals History BMI and BSA Data Body Mass Index Body Surface Area  
 30.67 kg/m 2 1.8 m 2 Preferred Pharmacy Pharmacy Name Phone [email or web address] [place] [address] [phone] Your Updated Medication List  
  
   
This list is accurate as of: 2/8/17 12:10 PM.  Always use your most recent med list.  
  
  
  
  
 * albuterol 90 mcg/actuation inhaler Commonly known as:  PROVENTIL HFA, VENTOLIN HFA, PROAIR HFA Take 1-2 Puffs by inhalation every four (4) hours as needed for Wheezing. * albuterol sulfate 90 mcg/actuation Aepb Commonly known as:  Norvel Abo Take 2 Puffs by inhalation every four (4) hours. * albuterol 2.5 mg /3 mL (0.083 %) nebulizer solution Commonly known as:  PROVENTIL VENTOLIN  
3 mL by Nebulization route every four (4) hours as needed for Wheezing. ALPRAZolam 0.5 mg tablet Commonly known as:  Dimitris [place] Take 1 tablet by mouth daily as needed for Anxiety. baclofen 10 mg tablet Commonly known as:  LIORESAL Take 1 Tab by mouth three (3) times daily. Ferrous Sulfate 47.5 mg iron Tber tablet Commonly known as:  SLOW FE Take 1 Tab by mouth daily. Take with vitamin C  
  
 gabapentin 600 mg tablet Commonly known as:  NEURONTIN Take 1 Tab by mouth three (3) times daily. guaiFENesin 100 mg/5 mL liquid Commonly known as:  ROBITUSSIN Take 10 mL by mouth three (3) times daily as needed for Cough. metoprolol tartrate 25 mg tablet Commonly known as:  LOPRESSOR Take 1 Tab by mouth two (2) times a day. naproxen 500 mg tablet Commonly known as:  NAPROSYN Take 1 tablet by mouth two (2) times daily (with meals). * Nebulizer & Compressor machine Use as directed * Nebulizer & Compressor machine Mask and tubing Nebulizer Accessories Kit Mask and tubing  
  
 norethindrone 0.35 mg Tab Commonly known as:  Johan & Johan Take 1 tablet by mouth daily. raNITIdine 150 mg tablet Commonly known as:  ZANTAC Take 1 Tab by mouth two (2) times a day. SUMAtriptan 100 mg tablet Commonly known as:  IMITREX Take 1 Tab by mouth once as needed for Migraine. topiramate 50 mg tablet Commonly known as:  TOPAMAX Take 1 Tab by mouth daily. * Notice: This list has 5 medication(s) that are the same as other medications prescribed for you. Read the directions carefully, and ask your doctor or other care provider to review them with you. Prescriptions Sent to Pharmacy Refills  
 metoprolol tartrate (LOPRESSOR) 25 mg tablet 1 Sig: Take 1 Tab by mouth two (2) times a day. Class: Normal  
 Pharmacy: [email or web address] [address] Ph #: [phone] Route: Oral  
 baclofen (LIORESAL) 10 mg tablet 0 Sig: Take 1 Tab by mouth three (3) times daily. Class: Normal  
 Pharmacy: [email or web address] [address] Ph #: [phone] Route: Oral  
 gabapentin (NEURONTIN) 600 mg tablet 0 Sig: Take 1 Tab by mouth three (3) times daily. Class: Normal  
 Pharmacy: [email or web address] [address] Ph #: [phone] Route: Oral  
  
We Performed the Following FERRITIN [20621 CPT(R)] IMM ADMIN Jessee Distel [66833 CPT(R)] PATHOLOGIST REVIEW SMEARS [YAI2405 Custom] THYROID ANTIBODY PANEL [06732 CPT(R)] THYROID PANEL W/TSH [08633 CPT(R)] Follow-up Instructions Return in about 2 weeks (around 2/21/2017) for recheck bp and review labs 10:45 am please . Patient Instructions If you have difficulty getting your nebulizer call Pasha Dias or Gary Cavanaugh  Call my nurse navigators they may be able to help you obtain a nebulizer Vaccine Information Statement Influenza (Flu) Vaccine (Inactivated or Recombinant): What you need to know Many Vaccine Information Statements are available in Austrian and other languages. See www.immunize.org/vis Hojas de Información Sobre Vacunas están disponibles en Español y en muchos otros idiomas. Visite www.immunize.org/vis 1. Why get vaccinated? Influenza (flu) is a contagious disease that spreads around the [place] every year, usually between October and May. Flu is caused by influenza viruses, and is spread mainly by coughing, sneezing, and close contact. Anyone can get flu. Flu strikes suddenly and can last several days. Symptoms vary by age, but can include: 
 fever/chills  sore throat  muscle aches  fatigue  cough  headache  runny or stuffy nose Flu can also lead to pneumonia and blood infections, and cause diarrhea and seizures in children. If you have a medical condition, such as heart or lung disease, flu can make it worse. Flu is more dangerous for some people. Infants and young children, people 72years of age and older, pregnant women, and people with certain health conditions or a weakened immune system are at greatest risk. Each year thousands of people in the [facility] die from flu, and many more are hospitalized. Flu vaccine can: 
 keep you from getting flu, 
 make flu less severe if you do get it, and 
 keep you from spreading flu to your family and other people. 2. Inactivated and recombinant flu vaccines A dose of flu vaccine is recommended every flu season. Children 6 months through 6years of age may need two doses during the same flu season. Everyone else needs only one dose each flu season. Some inactivated flu vaccines contain a very small amount of a mercury-based preservative called thimerosal. Studies have not shown thimerosal in vaccines to be harmful, but flu vaccines that do not contain thimerosal are available. There is no live flu virus in flu shots. They cannot cause the flu. There are many flu viruses, and they are always changing. Each year a new flu vaccine is made to protect against three or four viruses that are likely to cause disease in the upcoming flu season. But even when the vaccine doesnt exactly match these viruses, it may still provide some protection Flu vaccine cannot prevent: 
 flu that is caused by a virus not covered by the vaccine, or 
 illnesses that look like flu but are not. It takes about 2 weeks for protection to develop after vaccination, and protection lasts through the flu season. 3. Some people should not get this vaccine Tell the person who is giving you the vaccine:  If you have any severe, life-threatening allergies. If you ever had a life-threatening allergic reaction after a dose of flu vaccine, or have a severe allergy to any part of this vaccine, you may be advised not to get vaccinated. Most, but not all, types of flu vaccine contain a small amount of egg protein.  If you ever had Guillain-Barré Syndrome (also called GBS). Some people with a history of GBS should not get this vaccine. This should be discussed with your doctor.  If you are not feeling well. It is usually okay to get flu vaccine when you have a mild illness, but you might be asked to come back when you feel better. 4. Risks of a vaccine reaction With any medicine, including vaccines, there is a chance of reactions. These are usually mild and go away on their own, but serious reactions are also possible. Most people who get a flu shot do not have any problems with it. Minor problems following a flu shot include:  
 soreness, redness, or swelling where the shot was given  hoarseness  sore, red or itchy eyes  cough  fever  aches  headache  itching  fatigue If these problems occur, they usually begin soon after the shot and last 1 or 2 days. More serious problems following a flu shot can include the following:  There may be a small increased risk of Guillain-Barré Syndrome (GBS) after inactivated flu vaccine. This risk has been estimated at 1 or 2 additional cases per million people vaccinated. This is much lower than the risk of severe complications from flu, which can be prevented by flu vaccine.  Young children who get the flu shot along with pneumococcal vaccine (PCV13) and/or DTaP vaccine at the same time might be slightly more likely to have a seizure caused by fever. Ask your doctor for more information. Tell your doctor if a child who is getting flu vaccine has ever had a seizure. Problems that could happen after any injected vaccine:  People sometimes faint after a medical procedure, including vaccination. Sitting or lying down for about 15 minutes can help prevent fainting, and injuries caused by a fall. Tell your doctor if you feel dizzy, or have vision changes or ringing in the ears.  Some people get severe pain in the shoulder and have difficulty moving the arm where a shot was given. This happens very rarely.  Any medication can cause a severe allergic reaction. Such reactions from a vaccine are very rare, estimated at about 1 in a million doses, and would happen within a few minutes to a few hours after the vaccination. As with any medicine, there is a very remote chance of a vaccine causing a serious injury or death. The safety of vaccines is always being monitored. For more information, visit: www.cdc.gov/vaccinesafety/ 
 
5. What if there is a serious reaction? What should I look for?  Look for anything that concerns you, such as signs of a severe allergic reaction, very high fever, or unusual behavior. Signs of a severe allergic reaction can include hives, swelling of the face and throat, difficulty breathing, a fast heartbeat, dizziness, and weakness  usually within a few minutes to a few hours after the vaccination. What should I do?  If you think it is a severe allergic reaction or other emergency that cant wait, call 9-1-1 and get the person to the nearest hospital. Otherwise, call your doctor.  Reactions should be reported to the Vaccine Adverse Event Reporting System (VAERS). Your doctor should file this report, or you can do it yourself through  the VAERS web site at www.vaers. [facility].gov, or by calling 8-324.468.2690. VAERS does not give medical advice. 6. The National Vaccine Injury Compensation Program 
 
The [facility] Vaccine Injury Compensation Program (VICP) is a federal program that was created to compensate people who may have been injured by certain vaccines. Persons who believe they may have been injured by a vaccine can learn about the program and about filing a claim by calling 3-635.134.8513 or visiting the [employer] website at www.[facility].gov/vaccinecompensation. There is a time limit to file a claim for compensation. 7. How can I learn more?  Ask your healthcare provider. He or she can give you the vaccine package insert or suggest other sources of information.  Call your local or state health department.  Contact the Centers for Disease Control and Prevention (CDC): 
- Call 1-408.735.7679 (1-611-SRH-INFO) or 
- Visit CDCs website at www.cdc.gov/flu Vaccine Information Statement Inactivated Influenza Vaccine 8/7/2015 
42 SARAWilder Dunham 286ZX-[id number] Department of [facility] and [employer] Centers for Disease Control and Prevention Office Use Only Introducing [facility]! Natalie Cheatham introduces [employer] patient portal. Now you can access parts of your medical record, email your doctor's office, and request medication refills online. 1. In your internet browser, go to [email or web address]. [email or web address] 2. Click on the First Time User? Click Here link in the Sign In box. You will see the New Member Sign Up page. 3. Enter your [employer] Access Code exactly as it appears below.  You will not need to use this code after youve completed the sign-up process. If you do not sign up before the expiration date, you must request a new code. · [facility] Access Code: [email or web address] Expires: 4/12/2017  7:51 AM 
 
4. Enter the last four digits of your Social Security Number (xxxx) and Date of Birth (mm/dd/yyyy) as indicated and click Submit. You will be taken to the next sign-up page. 5. Create a [facility] ID. This will be your [facility] login ID and cannot be changed, so think of one that is secure and easy to remember. 6. Create a [facility] password. You can change your password at any time. 7. Enter your Password Reset Question and Answer. This can be used at a later time if you forget your password. 8. Enter your e-mail address. You will receive e-mail notification when new information is available in [address] 9. Click Sign Up. You can now view and download portions of your medical record. 10. Click the Download Summary menu link to download a portable copy of your medical information. If you have questions, please visit the Frequently Asked Questions section of the [facility] website. Remember, [facility] is NOT to be used for urgent needs. For medical emergencies, dial 911. Now available from your iPhone and Android! Please provide this summary of care documentation to your next provider. Your primary care clinician is listed as Ariel Alegre. If you have any questions after today's visit, please call [phone].

## 2017-02-08 NOTE — PATIENT INSTRUCTIONS
If you have difficulty getting your nebulizer call Sweta Schuster or Stacey Bourne  Call my nurse navigators they may be able to help you obtain a nebulizer   Vaccine Information Statement    Influenza (Flu) Vaccine (Inactivated or Recombinant): What you need to know    Many Vaccine Information Statements are available in [place]an and other languages. See www.immunize.org/vis  Hojas de Información Sobre Vacunas están disponibles en Español y en muchos otros idiomas. Visite www.immunize.org/vis    1. Why get vaccinated? Influenza (flu) is a contagious disease that spreads around the [place] every year, usually between October and May. Flu is caused by influenza viruses, and is spread mainly by coughing, sneezing, and close contact. Anyone can get flu. Flu strikes suddenly and can last several days. Symptoms vary by age, but can include:   fever/chills   sore throat   muscle aches   fatigue   cough   headache    runny or stuffy nose    Flu can also lead to pneumonia and blood infections, and cause diarrhea and seizures in children. If you have a medical condition, such as heart or lung disease, flu can make it worse. Flu is more dangerous for some people. Infants and young children, people 72years of age and older, pregnant women, and people with certain health conditions or a weakened immune system are at greatest risk. Each year thousands of people in the [facility] die from flu, and many more are hospitalized. Flu vaccine can:   keep you from getting flu,   make flu less severe if you do get it, and   keep you from spreading flu to your family and other people. 2. Inactivated and recombinant flu vaccines    A dose of flu vaccine is recommended every flu season. Children 6 months through 6years of age may need two doses during the same flu season. Everyone else needs only one dose each flu season.        Some inactivated flu vaccines contain a very small amount of a mercury-based preservative called thimerosal. Studies have not shown thimerosal in vaccines to be harmful, but flu vaccines that do not contain thimerosal are available. There is no live flu virus in flu shots. They cannot cause the flu. There are many flu viruses, and they are always changing. Each year a new flu vaccine is made to protect against three or four viruses that are likely to cause disease in the upcoming flu season. But even when the vaccine doesnt exactly match these viruses, it may still provide some protection    Flu vaccine cannot prevent:   flu that is caused by a virus not covered by the vaccine, or   illnesses that look like flu but are not. It takes about 2 weeks for protection to develop after vaccination, and protection lasts through the flu season. 3. Some people should not get this vaccine    Tell the person who is giving you the vaccine:     If you have any severe, life-threatening allergies. If you ever had a life-threatening allergic reaction after a dose of flu vaccine, or have a severe allergy to any part of this vaccine, you may be advised not to get vaccinated. Most, but not all, types of flu vaccine contain a small amount of egg protein.  If you ever had Guillain-Barré Syndrome (also called GBS). Some people with a history of GBS should not get this vaccine. This should be discussed with your doctor.  If you are not feeling well. It is usually okay to get flu vaccine when you have a mild illness, but you might be asked to come back when you feel better. 4. Risks of a vaccine reaction    With any medicine, including vaccines, there is a chance of reactions. These are usually mild and go away on their own, but serious reactions are also possible. Most people who get a flu shot do not have any problems with it.      Minor problems following a flu shot include:    soreness, redness, or swelling where the shot was given  hoarseness   sore, red or itchy eyes   cough   fever   aches   headache   itching   fatigue  If these problems occur, they usually begin soon after the shot and last 1 or 2 days. More serious problems following a flu shot can include the following:     There may be a small increased risk of Guillain-Barré Syndrome (GBS) after inactivated flu vaccine. This risk has been estimated at 1 or 2 additional cases per million people vaccinated. This is much lower than the risk of severe complications from flu, which can be prevented by flu vaccine.  Young children who get the flu shot along with pneumococcal vaccine (PCV13) and/or DTaP vaccine at the same time might be slightly more likely to have a seizure caused by fever. Ask your doctor for more information. Tell your doctor if a child who is getting flu vaccine has ever had a seizure. Problems that could happen after any injected vaccine:      People sometimes faint after a medical procedure, including vaccination. Sitting or lying down for about 15 minutes can help prevent fainting, and injuries caused by a fall. Tell your doctor if you feel dizzy, or have vision changes or ringing in the ears.  Some people get severe pain in the shoulder and have difficulty moving the arm where a shot was given. This happens very rarely.  Any medication can cause a severe allergic reaction. Such reactions from a vaccine are very rare, estimated at about 1 in a million doses, and would happen within a few minutes to a few hours after the vaccination. As with any medicine, there is a very remote chance of a vaccine causing a serious injury or death. The safety of vaccines is always being monitored. For more information, visit: www.cdc.gov/vaccinesafety/    5. What if there is a serious reaction? What should I look for?      Look for anything that concerns you, such as signs of a severe allergic reaction, very high fever, or unusual behavior. Signs of a severe allergic reaction can include hives, swelling of the face and throat, difficulty breathing, a fast heartbeat, dizziness, and weakness - usually within a few minutes to a few hours after the vaccination. What should I do?  If you think it is a severe allergic reaction or other emergency that cant wait, call 9-1-1 and get the person to the nearest hospital. Otherwise, call your doctor.  Reactions should be reported to the Vaccine Adverse Event Reporting System (VAERS). Your doctor should file this report, or you can do it yourself through  the VAERS web site at www.vaers. [facility].gov, or by calling 2-815.773.2305. VAERS does not give medical advice. 6. The National Vaccine Injury Compensation Program    [facility] Vaccine Injury Compensation Program (VICP) is a federal program that was created to compensate people who may have been injured by certain vaccines. Persons who believe they may have been injured by a vaccine can learn about the program and about filing a claim by calling 3-228.474.8756 or visiting the [employer] website at www.[facility].gov/vaccinecompensation. There is a time limit to file a claim for compensation. 7. How can I learn more?  Ask your healthcare provider. He or she can give you the vaccine package insert or suggest other sources of information.  Call your local or state health department.  Contact the Centers for Disease Control and Prevention (CDC):  - Call 2-353.815.3874 (1-800-CDC-INFO) or  - Visit CDCs website at www.cdc.gov/flu    Vaccine Information Statement   Inactivated Influenza Vaccine   8/7/2015  42 OBIE Cruz Maykelquest [id number]    Department of Health and Human Services  Centers for Disease Control and Prevention    Office Use Only

## 2017-02-08 NOTE — PROGRESS NOTES
1. Have you been to the ER, urgent care clinic since your last visit? Hospitalized since your last visit? No    2. Have you seen or consulted any other health care providers outside of the [address] [place] [address] since your last visit? Include any pap smears or colon screening.  No     Chief Complaint   Patient presents with    Results     follow up on lab results     Not fasting

## 2017-02-15 LAB
FERRITIN SERPL-MCNC: 70 NG/ML (ref 15–150)
FT4I SERPL CALC-MCNC: 2.3 (ref 1.2–4.9)
PATH REV BLD -IMP: NORMAL
PATHOLOGIST NAME: NORMAL
T3RU NFR SERPL: 31 % (ref 24–39)
T4 SERPL-MCNC: 7.5 UG/DL (ref 4.5–12)
THYROGLOB AB SERPL-ACNC: <1 IU/ML (ref 0–0.9)
THYROPEROXIDASE AB SERPL-ACNC: 8 IU/ML (ref 0–34)
TSH SERPL DL<=0.005 MIU/L-ACNC: 0.71 UIU/ML (ref 0.45–4.5)

## 2017-02-21 ENCOUNTER — OFFICE VISIT (OUTPATIENT)
Dept: INTERNAL MEDICINE CLINIC | Age: 47
End: 2017-02-21

## 2017-02-21 VITALS
TEMPERATURE: 98.2 F | DIASTOLIC BLOOD PRESSURE: 60 MMHG | OXYGEN SATURATION: 100 % | WEIGHT: 170.8 LBS | SYSTOLIC BLOOD PRESSURE: 103 MMHG | RESPIRATION RATE: 16 BRPM | BODY MASS INDEX: 31.43 KG/M2 | HEART RATE: 74 BPM | HEIGHT: 62 IN

## 2017-02-21 DIAGNOSIS — Z76.0 MEDICATION REFILL: ICD-10-CM

## 2017-02-21 DIAGNOSIS — K04.7 TOOTH ABSCESS: ICD-10-CM

## 2017-02-21 DIAGNOSIS — M79.2 PERIPHERAL NEUROPATHIC PAIN: ICD-10-CM

## 2017-02-21 DIAGNOSIS — J45.20 MILD INTERMITTENT ASTHMA WITHOUT COMPLICATION: ICD-10-CM

## 2017-02-21 DIAGNOSIS — Z91.018 FOOD ALLERGY: ICD-10-CM

## 2017-02-21 DIAGNOSIS — Z71.2 ENCOUNTER TO DISCUSS TEST RESULTS: ICD-10-CM

## 2017-02-21 DIAGNOSIS — M79.605 PAIN IN LEFT LEG: Primary | ICD-10-CM

## 2017-02-21 RX ORDER — ALBUTEROL SULFATE 0.83 MG/ML
2.5 SOLUTION RESPIRATORY (INHALATION)
Qty: 50 EACH | Refills: 0 | Status: SHIPPED | OUTPATIENT
Start: 2017-02-21

## 2017-02-21 RX ORDER — CEPHALEXIN 500 MG/1
500 CAPSULE ORAL 3 TIMES DAILY
Qty: 30 CAP | Refills: 0 | Status: SHIPPED | OUTPATIENT
Start: 2017-02-21 | End: 2017-03-03

## 2017-02-21 RX ORDER — BACLOFEN 10 MG/1
10 TABLET ORAL 3 TIMES DAILY
Qty: 90 TAB | Refills: 3 | Status: SHIPPED | OUTPATIENT
Start: 2017-02-21

## 2017-02-21 RX ORDER — GABAPENTIN 600 MG/1
600 TABLET ORAL 3 TIMES DAILY
Qty: 90 TAB | Refills: 3 | Status: SHIPPED | OUTPATIENT
Start: 2017-02-21

## 2017-02-21 RX ORDER — HYDROCODONE BITARTRATE AND ACETAMINOPHEN 5; 325 MG/1; MG/1
1 TABLET ORAL
Qty: 10 TAB | Refills: 0 | Status: SHIPPED | OUTPATIENT
Start: 2017-02-21 | End: 2017-06-27

## 2017-02-21 NOTE — MR AVS SNAPSHOT
Visit Information Date & Time Provider Department Dept. Phone Encounter #  
 2/21/2017 10:45 AM Chrissie Pizano, [address] [address] [place] [id number] Follow-up Instructions Return in about 1 week (around 2/28/2017) for recheck left leg pain and test results 30 min . Upcoming Health Maintenance Date Due Pneumococcal 19-64 Medium Risk (1 of 1 - PPSV23) 12/30/1991 DTaP/Tdap/Td series (1 - Tdap) 12/30/1993 PAP AKA CERVICAL CYTOLOGY 7/22/2017 Allergies as of 2/21/2017  Review Complete On: 2/21/2017 By: Violette Montes Severity Noted Reaction Type Reactions Pcn [Penicillins]  04/29/2010    Hives Swelling in hands Current Immunizations  Reviewed on 10/3/2014 Name Date Influenza Vaccine 10/3/2014 Influenza Vaccine Intradermal PF 2/8/2017 Not reviewed this visit You Were Diagnosed With   
  
 Codes Comments Pain In Left Leg    -  Primary ICD-10-CM: M79.605 ICD-9-CM: 729.5 Tooth abscess     ICD-10-CM: K04.7 ICD-9-CM: 522.5 Mild intermittent asthma without complication     [facility]-44-TP: J45.20 ICD-9-CM: 493.90 Encounter to discuss test results     ICD-10-CM: Z71.89 ICD-9-CM: V65.49 Food allergy     ICD-10-CM: N02.651 
ICD-9-CM: V15.05 Peripheral neuropathic pain     ICD-10-CM: G62.9 ICD-9-CM: 356.9 Medication refill     ICD-10-CM: Z76.0 ICD-9-CM: V68.1 Vitals BP Pulse Temp Resp Height(growth percentile) Weight(growth percentile) 103/60 (BP 1 Location: Left arm, BP Patient Position: Sitting) 74 98.2 °F (36.8 °C) (Oral) 16 5' 1.5\" (1.562 m) 170 lb 12.8 oz (77.5 kg) LMP SpO2 BMI OB Status Smoking Status 02/20/2015 100% 31.75 kg/m2 Hysterectomy Never Smoker BMI and BSA Data Body Mass Index Body Surface Area 31.75 kg/m 2 1.83 m 2 Preferred Pharmacy Pharmacy Name Phone  [email or web address], [address] [phone] Your Updated Medication List  
  
   
This list is accurate as of: 2/21/17 12:01 PM.  Always use your most recent med list.  
  
  
  
  
 * albuterol 90 mcg/actuation inhaler Commonly known as:  PROVENTIL HFA, VENTOLIN HFA, PROAIR HFA Take 1-2 Puffs by inhalation every four (4) hours as needed for Wheezing. * albuterol sulfate 90 mcg/actuation Aepb Commonly known as:  Druscilla Isis Take 2 Puffs by inhalation every four (4) hours. * albuterol 2.5 mg /3 mL (0.083 %) nebulizer solution Commonly known as:  PROVENTIL VENTOLIN  
3 mL by Nebulization route every four (4) hours as needed for Wheezing. ALPRAZolam 0.5 mg tablet Commonly known as:  Sharyon Kida Take 1 tablet by mouth daily as needed for Anxiety. baclofen 10 mg tablet Commonly known as:  LIORESAL Take 1 Tab by mouth three (3) times daily. cephALEXin 500 mg capsule Commonly known as:  Merdis Perone Take 1 Cap by mouth three (3) times daily for 10 days. Ferrous Sulfate 47.5 mg iron Tber tablet Commonly known as:  SLOW FE Take 1 Tab by mouth daily. Take with vitamin C  
  
 gabapentin 600 mg tablet Commonly known as:  NEURONTIN Take 1 Tab by mouth three (3) times daily. guaiFENesin 100 mg/5 mL liquid Commonly known as:  ROBITUSSIN Take 10 mL by mouth three (3) times daily as needed for Cough. HYDROcodone-acetaminophen 5-325 mg per tablet Commonly known as:  Thelbert [place] Take 1 Tab by mouth two (2) times daily as needed for Pain. Max Daily Amount: 2 Tabs. metoprolol tartrate 25 mg tablet Commonly known as:  LOPRESSOR Take 1 Tab by mouth two (2) times a day. naproxen 500 mg tablet Commonly known as:  NAPROSYN Take 1 tablet by mouth two (2) times daily (with meals). * Nebulizer & Compressor machine Use as directed * Nebulizer & Compressor machine Mask and tubing Nebulizer Accessories Kit Mask and tubing norethindrone 0.35 mg Tab Commonly known as:  Johan & Johan Take 1 tablet by mouth daily. raNITIdine 150 mg tablet Commonly known as:  ZANTAC Take 1 Tab by mouth two (2) times a day. SUMAtriptan 100 mg tablet Commonly known as:  IMITREX Take 1 Tab by mouth once as needed for Migraine. topiramate 50 mg tablet Commonly known as:  TOPAMAX Take 1 Tab by mouth daily. * Notice: This list has 5 medication(s) that are the same as other medications prescribed for you. Read the directions carefully, and ask your doctor or other care provider to review them with you. Prescriptions Printed Refills HYDROcodone-acetaminophen (NORCO) 5-325 mg per tablet 0 Sig: Take 1 Tab by mouth two (2) times daily as needed for Pain. Max Daily Amount: 2 Tabs. Class: Print Route: Oral  
  
Prescriptions Sent to Pharmacy Refills  
 albuterol (PROVENTIL VENTOLIN) 2.5 mg /3 mL (0.083 %) nebulizer solution 0 Sig: 3 mL by Nebulization route every four (4) hours as needed for Wheezing. Class: Normal  
 Pharmacy: [email or web address] [address] Ph #: [phone] Route: Nebulization  
 cephALEXin (KEFLEX) 500 mg capsule 0 Sig: Take 1 Cap by mouth three (3) times daily for 10 days. Class: Normal  
 Pharmacy: [email or web address] [address] Ph #: [phone] Route: Oral  
 gabapentin (NEURONTIN) 600 mg tablet 3 Sig: Take 1 Tab by mouth three (3) times daily. Class: Normal  
 Pharmacy: [email or web address] [address] Ph #: [phone] Route: Oral  
 baclofen (LIORESAL) 10 mg tablet 3 Sig: Take 1 Tab by mouth three (3) times daily. Class: Normal  
 Pharmacy: [email or web address] [address] Ph #: [phone] Route: Oral  
  
We Performed the Following ALLERGEN PROFILE, ZONE 2 [JOF66987 Custom] ALLERGEN, GLUTEN AB, IGE, QT [XUM46885 Custom] CELIAC ANTIBODY PROFILE [UMM47126 Custom] Follow-up Instructions Return in about 1 week (around 2/28/2017) for recheck left leg pain and test results 30 min . To-Do List   
 02/21/2017 Lab:  Hugo Narayanan   
  
 02/21/2017 Imaging:  XR TIB/FIB LT   
  
  
Patient Instructions Please obtain your xray before the next appt Introducing hospitals & HEALTH SERVICES! [facility] introduces [employer] patient portal. Now you can access parts of your medical record, email your doctor's office, and request medication refills online. 1. In your internet browser, go to [email or web address] 2. Click on the First Time User? Click Here link in the Sign In box. You will see the New Member Sign Up page. 3. Enter your [employer] Access Code exactly as it appears below. You will not need to use this code after youve completed the sign-up process. If you do not sign up before the expiration date, you must request a new code. · [employer] Access Code: [email or web address] Expires: 4/12/2017  7:51 AM 
 
4. Enter the last four digits of your Social Security Number (xxxx) and Date of Birth (mm/dd/yyyy) as indicated and click Submit. You will be taken to the next sign-up page. 5. Create a [employer] ID. This will be your [employer] login ID and cannot be changed, so think of one that is secure and easy to remember. 6. Create a [employer] password. You can change your password at any time. 7. Enter your Password Reset Question and Answer. This can be used at a later time if you forget your password. 8. Enter your e-mail address. You will receive e-mail notification when new information is available in [address] 9. Click Sign Up. You can now view and download portions of your medical record. 10. Click the Download Summary menu link to download a portable copy of your medical information. If you have questions, please visit the Frequently Asked Questions section of the [employer] website. Remember, [employer] is NOT to be used for urgent needs. For medical emergencies, dial 911. Now available from your iPhone and Android! Please provide this summary of care documentation to your next provider. Your primary care clinician is listed as Lexy Stacy. If you have any questions after today's visit, please call [phone].

## 2017-02-21 NOTE — PROGRESS NOTES
Chief Complaint   Patient presents with    Results     lab f/u (Rm #6)    Medication Refill     refills pended           Subjective:   Ebonie Romero 42 y.o.  female with a  past medical history reviewed see below. comes in today c/o: After getting off the bus she felt pain in her left foot in the lower leg felt like it did when she had a stress fx bearing weight but hurts it just occurred. No trauma she is aware. No recent falls but has tripping a bit. She cut back on gluten and has felt a bit better. And stopped it for two weeks but ate some wheat thins a few days ago and felt \"funny\" left side of mouth pain started three-four days ago  No fevers or chills but some headaches - she does not have a dentitst yet no neck pain + otalgia on the left  Used motrin no help   constipation off and on for about three weeks since hysterctomy no painful defecation   With neuropathy gabapentin is helping     ROS: otherwise feeling generally well. All other systems reviewed and are negative      Current Outpatient Prescriptions   Medication Sig Dispense Refill    albuterol (PROVENTIL VENTOLIN) 2.5 mg /3 mL (0.083 %) nebulizer solution 3 mL by Nebulization route every four (4) hours as needed for Wheezing. 50 Each 0    cephALEXin (KEFLEX) 500 mg capsule Take 1 Cap by mouth three (3) times daily for 10 days. 30 Cap 0    HYDROcodone-acetaminophen (NORCO) 5-325 mg per tablet Take 1 Tab by mouth two (2) times daily as needed for Pain. Max Daily Amount: 2 Tabs. 10 Tab 0    gabapentin (NEURONTIN) 600 mg tablet Take 1 Tab by mouth three (3) times daily. 90 Tab 3    baclofen (LIORESAL) 10 mg tablet Take 1 Tab by mouth three (3) times daily. 90 Tab 3    metoprolol tartrate (LOPRESSOR) 25 mg tablet Take 1 Tab by mouth two (2) times a day. 60 Tab 1    SUMAtriptan (IMITREX) 100 mg tablet Take 1 Tab by mouth once as needed for Migraine. 10 Tab 4    topiramate (TOPAMAX) 50 mg tablet Take 1 Tab by mouth daily.  30 Tab 3    Nebulizer & Compressor machine Mask and tubing 1 Each 0    albuterol (PROVENTIL HFA, VENTOLIN HFA, PROAIR HFA) 90 mcg/actuation inhaler Take 1-2 Puffs by inhalation every four (4) hours as needed for Wheezing. 1 Inhaler 1    Nebulizer & Compressor machine Use as directed 1 each 0    Nebulizer Accessories kit Mask and tubing 1 kit 0    albuterol sulfate (PROAIR RESPICLICK) 90 mcg/actuation aepb Take 2 Puffs by inhalation every four (4) hours. 1 Inhaler 0    guaiFENesin (ROBITUSSIN) 100 mg/5 mL liquid Take 10 mL by mouth three (3) times daily as needed for Cough. 118 mL 0    ranitidine (ZANTAC) 150 mg tablet Take 1 Tab by mouth two (2) times a day. 60 Tab 1    Ferrous Sulfate (FLOW FE) 47.5 mg iron TbER tablet Take 1 Tab by mouth daily. Take with vitamin C 30 Tab 1    naproxen (NAPROSYN) 500 mg tablet Take 1 tablet by mouth two (2) times daily (with meals). 60 tablet 0    norethindrone (MICRONOR) 0.35 mg tablet Take 1 tablet by mouth daily. 1 Package 12    ALPRAZolam (XANAX) 0.5 mg tablet Take 1 tablet by mouth daily as needed for Anxiety.  30 tablet 0     Allergies   Allergen Reactions    Pcn [Penicillins] Hives     Swelling in hands      Past Medical History:   Diagnosis Date    Asthma 4/29/2010    Bronchitis     CAD (coronary artery disease)     Sinus Tach    Cerebral palsy ([address] 4/29/2010    CP [facility]     Endocrine disease     Thyroid goiter    Goiter     Hypertension     Lumbar disc herniation 4/29/2010    Migraine     Neurological disorder     cerebral palsy    Other ill-defined conditions(799.89)     pleurisy    Other ill-defined conditions(799.89)     Migraines    Psychiatric disorder     anxiety    Sinus tachycardia 5/23/2014    Thyroid disease      Past Surgical History:   Procedure Laterality Date    HX GYN      Tubal ligation, total hyterectomy    HX HEENT      T & A    HX ORTHOPAEDIC      carpal tunnel release    HX ORTHOPAEDIC      tendon repair R hand    HX ORTHOPAEDIC      pin in toe of R foot    LAP,TUBAL CAUTERY       Family History   Problem Relation Age of Onset   Yesi Cerda Asthma Mother     Hypertension Mother     Diabetes Mother     Cancer Father      prostate    Asthma Daughter     Asthma Daughter     Stroke Maternal Grandfather     Cancer Paternal Grandmother      throat     Social History   Substance Use Topics    Smoking status: Never Smoker    Smokeless tobacco: Never Used    Alcohol use No          Objective:     Visit Vitals    /60 (BP 1 Location: Left arm, BP Patient Position: Sitting)    Pulse 74    Temp 98.2 °F (36.8 °C) (Oral)    Resp 16    Ht 5' 1.5\" (1.562 m)    Wt 170 lb 12.8 oz (77.5 kg)    LMP 02/20/2015    SpO2 100%    BMI 31.75 kg/m2     Gen: NAD, pleasant  HEENT: normal appearing head, nares patent, PERRLA, EOMI, oropharynx no erythema, no cervical lymphadenopathy neck supple left jaw swelling noted in lymphnode and left lower back tooth abcess   Cardio: RRR nl S1S2 no murmur  Lungs CTAB no wheeze no rales no rhonchi  ABD Soft non tender non distended + bowel sounds  Extremities: full ROM X 4 no clubbing no cyanosis walking fine mild tendnerss in lowe rleg   Neuro: no gross focal deficits noted, alert and orientated X 3  Psych.: well groomed no outward signs of depression. Assessment/Plan:   Deepti Munson was seen today for results and medication refill. Diagnoses and all orders for this visit:    Pain In Left Leg  -     XR TIB/FIB LT; Future    Tooth abscess    Mild intermittent asthma without complication  -     albuterol (PROVENTIL VENTOLIN) 2.5 mg /3 mL (0.083 %) nebulizer solution; 3 mL by Nebulization route every four (4) hours as needed for Wheezing. Encounter to discuss test results    Food allergy  -     GLIADIN AB, IGA;  Future  -     CELIAC ANTIBODY PROFILE  -     ALLERGEN, GLUTEN AB, IGE, QT  -     ALLERGEN PROFILE, ZONE 2  -     GLIADIN AB, IGA    Peripheral neuropathic pain    Medication refill  - baclofen (LIORESAL) 10 mg tablet; Take 1 Tab by mouth three (3) times daily. Other orders  -     cephALEXin (KEFLEX) 500 mg capsule; Take 1 Cap by mouth three (3) times daily for 10 days.  -     HYDROcodone-acetaminophen (NORCO) 5-325 mg per tablet; Take 1 Tab by mouth two (2) times daily as needed for Pain. Max Daily Amount: 2 Tabs. -     gabapentin (NEURONTIN) 600 mg tablet; Take 1 Tab by mouth three (3) times daily. Follow-up Disposition:  Return in about 1 week (around 2/28/2017) for recheck left leg pain and test results 30 min . Candance Huger avs printed and given to the pt. Return f/up on neurology appt going today. Used keflex in the past with no issues. The patient voiced understanding of the above. Medication side effects were reviewed with the patient. Call with any concerns.

## 2017-02-21 NOTE — PROGRESS NOTES
Chief Complaint   Patient presents with    Results     lab f/u (Rm #6)    Medication Refill     refills pended

## 2017-02-24 LAB
A ALTERNATA IGE QN: <0.1 KU/L
A FUMIGATUS IGE QN: 0.18 KU/L
AMER ROACH IGE QN: <0.1 KU/L
BAHIA GRASS IGE QN: <0.1 KU/L
BERMUDA GRASS IGE QN: <0.1 KU/L
BOXELDER IGE QN: <0.1 KU/L
C HERBARUM IGE QN: <0.1 KU/L
CAT DANDER IGE QN: <0.1 KU/L
CMN PIGWEED IGE QN: <0.1 KU/L
COMMON RAGWEED IGE QN: <0.1 KU/L
D FARINAE IGE QN: <0.1 KU/L
D PTERONYSS IGE QN: <0.1 KU/L
DOG DANDER IGE QN: <0.1 KU/L
ENGL PLANTAIN IGE QN: <0.1 KU/L
GLIADIN PEPTIDE IGA SER-ACNC: 3 UNITS (ref 0–19)
GLIADIN PEPTIDE IGG SER-ACNC: 3 UNITS (ref 0–19)
GLUTEN IGE QN: <0.1 KU/L
IGA SERPL-MCNC: 196 MG/DL (ref 87–352)
JOHNSON GRASS IGE QN: <0.1 KU/L
LONDON PLANE IGE QN: <0.1 KU/L
Lab: ABNORMAL
M RACEMOSUS IGE QN: <0.1 KU/L
MT JUNIPER IGE QN: <0.1 KU/L
MUGWORT IGE QN: <0.1 KU/L
NETTLE IGE QN: <0.1 KU/L
P NOTATUM IGE QN: <0.1 KU/L
S BOTRYOSUM IGE QN: <0.1 KU/L
SHEEP SORREL IGE QN: <0.1 KU/L
SILVER BIRCH IGE QN: <0.1 KU/L
SWEET GUM IGE QN: <0.1 KU/L
TIMOTHY IGE QN: <0.1 KU/L
TTG IGA SER-ACNC: <2 U/ML (ref 0–3)
TTG IGG SER-ACNC: <2 U/ML (ref 0–5)
WHITE ELM IGE QN: <0.1 KU/L
WHITE HICKORY IGE QN: <0.1 KU/L
WHITE MULBERRY IGE QN: <0.1 KU/L
WHITE OAK IGE QN: <0.1 KU/L

## 2017-03-01 ENCOUNTER — HOSPITAL ENCOUNTER (OUTPATIENT)
Dept: GENERAL RADIOLOGY | Age: 47
Discharge: HOME | End: 2017-03-01
Payer: COMMERCIAL

## 2017-03-01 DIAGNOSIS — M79.605 PAIN IN LEFT LEG: ICD-10-CM

## 2017-03-01 PROCEDURE — 73590 X-RAY EXAM OF LOWER LEG: CPT

## 2017-03-02 ENCOUNTER — OFFICE VISIT (OUTPATIENT)
Dept: INTERNAL MEDICINE CLINIC | Age: 47
End: 2017-03-02

## 2017-03-02 VITALS
RESPIRATION RATE: 18 BRPM | BODY MASS INDEX: 31.79 KG/M2 | SYSTOLIC BLOOD PRESSURE: 136 MMHG | DIASTOLIC BLOOD PRESSURE: 73 MMHG | HEART RATE: 97 BPM | OXYGEN SATURATION: 100 % | HEIGHT: 61 IN | TEMPERATURE: 98.9 F | WEIGHT: 168.4 LBS

## 2017-03-02 DIAGNOSIS — M79.671 ACUTE FOOT PAIN, RIGHT: Primary | ICD-10-CM

## 2017-03-02 DIAGNOSIS — Z71.2 ENCOUNTER TO DISCUSS TEST RESULTS: ICD-10-CM

## 2017-03-02 NOTE — LETTER
NOTIFICATION RETURN TO WORK / SCHOOL 
 
3/2/2017 3:59 PM 
 
Ms. Lidia High [address] [facility] To Whom It May Concern: 
 
Lidia High is currently under the care of [address] [place] [address]. She will return to work/school on: Monday, March 6,2017 If there are questions or concerns please have the patient contact our office.  
 
 
 
Sincerely, 
 
 
Antonino Damian MD

## 2017-03-02 NOTE — MR AVS SNAPSHOT
Visit Information Date & Time Provider Department Dept. Phone Encounter #  
 3/2/2017  3:15  [facility] [place], [facility] [phone] [id number] Follow-up Instructions Return in about 6 weeks (around 4/13/2017) for f/up PT 15 min . Upcoming Health Maintenance Date Due Pneumococcal 19-64 Medium Risk (1 of 1 - PPSV23) 12/30/1991 DTaP/Tdap/Td series (1 - Tdap) 12/30/1993 PAP AKA CERVICAL CYTOLOGY 7/22/2017 Allergies as of 3/2/2017  Review Complete On: 3/2/2017 By: Simin Garduno Severity Noted Reaction Type Reactions Pcn [Penicillins]  04/29/2010    Hives Swelling in hands Current Immunizations  Reviewed on 10/3/2014 Name Date Influenza Vaccine 10/3/2014 Influenza Vaccine Intradermal PF 2/8/2017 Not reviewed this visit You Were Diagnosed With   
  
 Codes Comments Acute foot pain, right    -  Primary ICD-10-CM: Q05.048 ICD-9-CM: 729.5 Encounter to discuss test results     ICD-10-CM: Z71.89 ICD-9-CM: V65.49 Vitals BP  
  
  
  
  
  
 136/73 (BP 1 Location: Left arm, BP Patient Position: Sitting) Vitals History BMI and BSA Data Body Mass Index Body Surface Area  
 31.82 kg/m 2 1.81 m 2 Preferred Pharmacy Pharmacy Name Phone [email or web address], [address] [phone] Your Updated Medication List  
  
   
This list is accurate as of: 3/2/17  3:50 PM.  Always use your most recent med list.  
  
  
  
  
 * albuterol 90 mcg/actuation inhaler Commonly known as:  PROVENTIL HFA, VENTOLIN HFA, PROAIR HFA Take 1-2 Puffs by inhalation every four (4) hours as needed for Wheezing. * albuterol sulfate 90 mcg/actuation Aepb Commonly known as:  Bryant Clark Take 2 Puffs by inhalation every four (4) hours. * albuterol 2.5 mg /3 mL (0.083 %) nebulizer solution Commonly known as:  PROVENTIL VENTOLIN  
3 mL by Nebulization route every four (4) hours as needed for Wheezing. ALPRAZolam 0.5 mg tablet Commonly known as:  Willy Sequin Take 1 tablet by mouth daily as needed for Anxiety. baclofen 10 mg tablet Commonly known as:  LIORESAL Take 1 Tab by mouth three (3) times daily. cephALEXin 500 mg capsule Commonly known as:  Elisa Whitney Take 1 Cap by mouth three (3) times daily for 10 days. Ferrous Sulfate 47.5 mg iron Tber tablet Commonly known as:  SLOW FE Take 1 Tab by mouth daily. Take with vitamin C  
  
 gabapentin 600 mg tablet Commonly known as:  NEURONTIN Take 1 Tab by mouth three (3) times daily. guaiFENesin 100 mg/5 mL liquid Commonly known as:  ROBITUSSIN Take 10 mL by mouth three (3) times daily as needed for Cough. HYDROcodone-acetaminophen 5-325 mg per tablet Commonly known as:  Solange Mura Take 1 Tab by mouth two (2) times daily as needed for Pain. Max Daily Amount: 2 Tabs. metoprolol tartrate 25 mg tablet Commonly known as:  LOPRESSOR Take 1 Tab by mouth two (2) times a day. naproxen 500 mg tablet Commonly known as:  NAPROSYN Take 1 tablet by mouth two (2) times daily (with meals). * Nebulizer & Compressor machine Use as directed * Nebulizer & Compressor machine Mask and tubing Nebulizer Accessories Kit Mask and tubing  
  
 norethindrone 0.35 mg Tab Commonly known as:  Johan & Johan Take 1 tablet by mouth daily. raNITIdine 150 mg tablet Commonly known as:  ZANTAC Take 1 Tab by mouth two (2) times a day. SUMAtriptan 100 mg tablet Commonly known as:  IMITREX Take 1 Tab by mouth once as needed for Migraine. topiramate 50 mg tablet Commonly known as:  TOPAMAX Take 1 Tab by mouth daily. * Notice: This list has 5 medication(s) that are the same as other medications prescribed for you.  Read the directions carefully, and ask your doctor or other care provider to review them with you. We Performed the Following REFERRAL TO PHYSICAL THERAPY [RSY88 Custom] Comments:  
 Please evaluate patient for  Acute on chronic left foot pain Follow-up Instructions Return in about 6 weeks (around 4/13/2017) for f/up PT 15 min . To-Do List   
 03/02/2017 Imaging:  XR FOOT LT MIN 3 V Referral Information Referral ID Referred By Referred To  
  
 [id number] Brendan Crew, [address], PT, DPT   
   [address] C/Errol KEITA Lakhwinder Final Phone: [phone] Fax: [phone] Visits Status Start Date End Date 1 New Request 3/2/17 3/2/18 If your referral has a status of pending review or denied, additional information will be sent to support the outcome of this decision. Introducing [facility] & HEALTH SERVICES! Keri Arenas introduces [employer] patient portal. Now you can access parts of your medical record, email your doctor's office, and request medication refills online. 1. In your internet browser, go to [email or web address] 2. Click on the First Time User? Click Here link in the Sign In box. You will see the New Member Sign Up page. 3. Enter your [employer] Access Code exactly as it appears below. You will not need to use this code after youve completed the sign-up process. If you do not sign up before the expiration date, you must request a new code. · [employer] Access Code: [email or web address] Expires: 4/12/2017  7:51 AM 
 
4. Enter the last four digits of your Social Security Number (xxxx) and Date of Birth (mm/dd/yyyy) as indicated and click Submit. You will be taken to the next sign-up page. 5. Create a [employer] ID. This will be your [employer] login ID and cannot be changed, so think of one that is secure and easy to remember. 6. Create a [employer] password. You can change your password at any time. 7. Enter your Password Reset Question and Answer. This can be used at a later time if you forget your password. 8. Enter your e-mail address. You will receive e-mail notification when new information is available in [address] 9. Click Sign Up. You can now view and download portions of your medical record. 10. Click the Download Summary menu link to download a portable copy of your medical information. If you have questions, please visit the Frequently Asked Questions section of the [employer] website. Remember, [employer] is NOT to be used for urgent needs. For medical emergencies, dial 911. Now available from your iPhone and Android! Please provide this summary of care documentation to your next provider. Your primary care clinician is listed as Vinayak Tabor. If you have any questions after today's visit, please call [phone].

## 2017-03-02 NOTE — PROGRESS NOTES
Chief Complaint   Patient presents with    Leg Injury     left foot    Results     x ray            Subjective:   Dot Shepard 42 y.o.  female with a  past medical history reviewed see below. comes in today for test resuls and stil has pain in the left foot no improvement since the last vist and worse with stepping down on foot and with walking gabapentin is helping her foot. She hss been working ok hoewver denies any other symptoms     ROS: otherwise feeling generally well. All other systems reviewed and are negative      Current Outpatient Prescriptions   Medication Sig Dispense Refill    albuterol (PROVENTIL VENTOLIN) 2.5 mg /3 mL (0.083 %) nebulizer solution 3 mL by Nebulization route every four (4) hours as needed for Wheezing. 50 Each 0    HYDROcodone-acetaminophen (NORCO) 5-325 mg per tablet Take 1 Tab by mouth two (2) times daily as needed for Pain. Max Daily Amount: 2 Tabs. 10 Tab 0    gabapentin (NEURONTIN) 600 mg tablet Take 1 Tab by mouth three (3) times daily. 90 Tab 3    baclofen (LIORESAL) 10 mg tablet Take 1 Tab by mouth three (3) times daily. 90 Tab 3    metoprolol tartrate (LOPRESSOR) 25 mg tablet Take 1 Tab by mouth two (2) times a day. 60 Tab 1    SUMAtriptan (IMITREX) 100 mg tablet Take 1 Tab by mouth once as needed for Migraine. 10 Tab 4    topiramate (TOPAMAX) 50 mg tablet Take 1 Tab by mouth daily. 30 Tab 3    Nebulizer & Compressor machine Mask and tubing 1 Each 0    albuterol (PROVENTIL HFA, VENTOLIN HFA, PROAIR HFA) 90 mcg/actuation inhaler Take 1-2 Puffs by inhalation every four (4) hours as needed for Wheezing. 1 Inhaler 1    ranitidine (ZANTAC) 150 mg tablet Take 1 Tab by mouth two (2) times a day. 60 Tab 1    ALPRAZolam (XANAX) 0.5 mg tablet Take 1 tablet by mouth daily as needed for Anxiety.  30 tablet 0    Nebulizer & Compressor machine Use as directed 1 each 0    Nebulizer Accessories kit Mask and tubing 1 kit 0    albuterol sulfate (PROAIR RESPICLICK) 90 mcg/actuation aepb Take 2 Puffs by inhalation every four (4) hours. 1 Inhaler 0    guaiFENesin (ROBITUSSIN) 100 mg/5 mL liquid Take 10 mL by mouth three (3) times daily as needed for Cough. 118 mL 0    Ferrous Sulfate (FLOW FE) 47.5 mg iron TbER tablet Take 1 Tab by mouth daily. Take with vitamin C 30 Tab 1    naproxen (NAPROSYN) 500 mg tablet Take 1 tablet by mouth two (2) times daily (with meals). 60 tablet 0    norethindrone (MICRONOR) 0.35 mg tablet Take 1 tablet by mouth daily.  1 Package 12     Allergies   Allergen Reactions    Pcn [Penicillins] Hives     Swelling in hands      Past Medical History:   Diagnosis Date    Asthma 4/29/2010    Bronchitis     CAD (coronary artery disease)     Sinus Tach    Cerebral palsy ([address] 4/29/2010    [facility]     Endocrine disease     Thyroid goiter    Goiter     Hypertension     Lumbar disc herniation 4/29/2010    Migraine     Neurological disorder     cerebral palsy    Other ill-defined conditions(799.89)     pleurisy    Other ill-defined conditions(799.89)     Migraines    Psychiatric disorder     anxiety    Sinus tachycardia 5/23/2014    Thyroid disease      Past Surgical History:   Procedure Laterality Date    HX GYN      Tubal ligation, total hyterectomy    HX HEENT      T & A    HX ORTHOPAEDIC      carpal tunnel release    HX ORTHOPAEDIC      tendon repair R hand    HX ORTHOPAEDIC      pin in toe of R foot    LAP,TUBAL CAUTERY       Family History   Problem Relation Age of Onset    Asthma Mother     Hypertension Mother     Diabetes Mother     Cancer Father      prostate    Asthma Daughter     Asthma Daughter     Stroke Maternal Grandfather     Cancer Paternal Grandmother      throat     Social History   Substance Use Topics    Smoking status: Never Smoker    Smokeless tobacco: Never Used    Alcohol use No          Objective:     Visit Vitals    /73 (BP 1 Location: Left arm, BP Patient Position: Sitting)    Pulse 97    Temp 98.9 °F (37.2 °C) (Oral)    Resp 18    Ht 5' 1\" (1.549 m)    Wt 168 lb 6.4 oz (76.4 kg)    LMP 02/20/2015    SpO2 100%    BMI 31.82 kg/m2     Gen: NAD, pleasant  Cardio: RRR nl S1S2 no murmur  Lungs CTAB no wheeze no rales no rhonchi  Extremities left lateral maeelous mild tenderness with palpation mild edema improved     Assessment/Plan:   Adia Brooke was seen today for leg injury and results. Diagnoses and all orders for this visit:    Acute foot pain, right  -     REFERRAL TO PHYSICAL THERAPY  -     XR FOOT LT MIN 3 V; Future    Encounter to discuss test results      Follow-up Disposition:  Return in about 6 weeks (around 4/13/2017) for f/up PT 15 min . Lexis Talbert test results reviewed in detail   avs printed and given to the pt. [facility] x[facility] pain increased in lateral malloeolsu pt to call back if she needs a work note for tommorow for rest.   The patient voiced understanding of the above. Medication side effects were reviewed with the patient. Call with any concerns.

## 2017-03-02 NOTE — PROGRESS NOTES
Chief Complaint   Patient presents with    Leg Injury     left foot    Results     x ray      Room 5

## 2017-03-17 ENCOUNTER — HOSPITAL ENCOUNTER (OUTPATIENT)
Dept: PHYSICAL THERAPY | Age: 47
Discharge: HOME | End: 2017-03-17
Payer: COMMERCIAL

## 2017-03-17 PROCEDURE — 97162 PT EVAL MOD COMPLEX 30 MIN: CPT | Performed by: PHYSICAL THERAPIST

## 2017-03-17 PROCEDURE — 97110 THERAPEUTIC EXERCISES: CPT | Performed by: PHYSICAL THERAPIST
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
## 2017-03-22 NOTE — ANCILLARY DISCHARGE INSTRUCTIONS
[facility]  [address] [address]  Phone: [phone]  Fax: [phone]    Discharge Summary  2-15    Patient name: Liza Gregg  : 1970  Provider#: [id number]  Referral source: Galina Ugalde, *      Medical/Treatment Diagnosis: Pain in right foot [M79.671]     Prior Hospitalization: see medical history     Comorbidities: cerebral palsy  Prior Level of Function: complete 20 minutes of exercise seldom or never  Medications: Verified on Patient Summary List     Start of Care: 3/17/2017           Onset Date:    Visits from Start of Care: 1     Missed Visits: 0  Reporting Period : 3/17/2017 to 3/17/2017    Short Term Goals: To be accomplished in 1 treatments:  1) Pt will be independent with HEP. ASSESSMENT/SUMMARY OF CARE:  Discussed recommendation for patient to see orthotist for foot orthosis viral    RECOMMENDATIONS:  [x]Discontinue therapy:  Pt would not likely response to treatment at this time. She would best benefit from foot orthosis evaluation.     Norma Kessler PT, DPT 3/21/2017 9:29 PM

## 2017-03-22 NOTE — PROGRESS NOTES
[facility]  [address] [address]  Phone: [phone]  Fax: [phone]    Plan of Care/Statement of Necessity for Physical Therapy Services  2-15    Patient name: Hilary Hernandez  : 1970  Provider#: [id number]  Referral source: Annetta Sifuentes, *      Medical/Treatment Diagnosis: Pain in right foot [M79.671]     Prior Hospitalization: see medical history     Comorbidities: cerebral palsy  Prior Level of Function: complete 20 minutes of exercise seldom or never  Medications: Verified on Patient Summary List    Start of Care: 3/17/2017      Onset Date:        The Plan of Care and following information is based on the information from the initial evaluation. Assessment/ medina information: 42 y.o. Female with left foot pain secondary to trauma in  with abnormal foot posturing and spasticity in left LE secondary to cerebral palsy. Excessive foot mobility into pronated position in combination of ligamentous postural strain in presence of potential complex regional pain syndrome resulting in pain with ambulation. Recommended home medial arch support stretching and weight bearing training and consult with orthotist to develop foot brace for shoes to support medial longitudinal arch and reduce excessive stress to foot tarsal bones in weight bearing.     Evaluation Complexity History MEDIUM  Complexity : 1-2 comorbidities / personal factors will impact the outcome/ POC ; Examination HIGH Complexity : 4+ Standardized tests and measures addressing body structure, function, activity limitation and / or participation in recreation  ;Presentation LOW Complexity : Stable, uncomplicated  ;Clinical Decision Making MEDIUM Complexity : FOTO score of 26-74  Overall Complexity Rating: LOW     Problem List: pain affecting function, decrease ROM, decrease strength, impaired gait/ balance, decrease ADL/ functional abilitiies, decrease activity tolerance and decrease flexibility/ joint mobility   Treatment Plan may include any combination of the following: Therapeutic exercise  Patient / Family readiness to learn indicated by: asking questions and trying to perform skills  Persons(s) to be included in education: patient (P)  Barriers to Learning/Limitations: yes;  physical  Patient Goal (s): not sure, Dr just said to go to therapy  Patient Self Reported Health Status: fair  Rehabilitation Potential: poor    Short Term Goals: To be accomplished in 1 treatments:  1) Pt will be independent with HEP. Frequency / Duration: Patient to be seen 1 times then discharge to [facility]. Patient/ Caregiver education and instruction: exercises    [x]  Plan of care has been reviewed with UZIEL Tierney, PT, DPT 3/21/2017 9:24 PM    ________________________________________________________________________    I certify that the above Therapy Services are being furnished while the patient is under my care. I agree with the treatment plan and certify that this therapy is necessary.     [de-identified] Signature:____________________  Date:____________Time: _________

## 2017-03-28 ENCOUNTER — HOSPITAL ENCOUNTER (OUTPATIENT)
Dept: GENERAL RADIOLOGY | Age: 47
Discharge: HOME | End: 2017-03-28
Attending: FAMILY MEDICINE
Payer: COMMERCIAL

## 2017-03-28 DIAGNOSIS — M79.671 ACUTE FOOT PAIN, RIGHT: ICD-10-CM

## 2017-03-28 PROCEDURE — 73630 X-RAY EXAM OF FOOT: CPT

## 2017-03-29 ENCOUNTER — OFFICE VISIT (OUTPATIENT)
Dept: INTERNAL MEDICINE CLINIC | Age: 47
End: 2017-03-29

## 2017-03-29 ENCOUNTER — HOSPITAL ENCOUNTER (OUTPATIENT)
Dept: GENERAL RADIOLOGY | Age: 47
Discharge: HOME | End: 2017-03-29
Payer: COMMERCIAL

## 2017-03-29 VITALS
SYSTOLIC BLOOD PRESSURE: 106 MMHG | TEMPERATURE: 98.6 F | WEIGHT: 164.6 LBS | DIASTOLIC BLOOD PRESSURE: 67 MMHG | BODY MASS INDEX: 31.08 KG/M2 | OXYGEN SATURATION: 96 % | RESPIRATION RATE: 16 BRPM | HEART RATE: 79 BPM | HEIGHT: 61 IN

## 2017-03-29 DIAGNOSIS — F41.0 PANIC ATTACKS: ICD-10-CM

## 2017-03-29 DIAGNOSIS — R09.1 PLEURISY: ICD-10-CM

## 2017-03-29 DIAGNOSIS — R09.1 PLEURISY: Primary | ICD-10-CM

## 2017-03-29 DIAGNOSIS — G90.522 COMPLEX REGIONAL PAIN SYNDROME TYPE 1 OF LEFT LOWER EXTREMITY: ICD-10-CM

## 2017-03-29 PROCEDURE — 71020 XR CHEST PA LAT: CPT

## 2017-03-29 RX ORDER — FLUOXETINE 10 MG/1
10 TABLET ORAL DAILY
Qty: 30 TAB | Refills: 1 | Status: SHIPPED | OUTPATIENT
Start: 2017-03-29 | End: 2017-06-02 | Stop reason: SDUPTHER

## 2017-03-29 RX ORDER — PREDNISONE 10 MG/1
10 TABLET ORAL SEE ADMIN INSTRUCTIONS
Qty: 21 TAB | Refills: 0 | Status: SHIPPED | OUTPATIENT
Start: 2017-03-29 | End: 2017-06-27

## 2017-03-29 NOTE — PROGRESS NOTES
Chief Complaint   Patient presents with    Foot Pain     (RM5)    Anxiety    Medication Refill     xanax, pain med           Subjective:   Audra Louie 42 y.o.  female with a  past medical history reviewed see below. comes in today c/o: foot pain  Went to PT still having pain in foot left 6/10 the pain is a bit worse as well. She has bene to orthopedist at [facility] yrs ago   Had a anxiety attack two weeks ago and another once yesterday and had some flashing light which is her trigger for a migraine has stress at work as well as home   She states her co workers overheard her stating her grandson has norwalk virus  She has not seen him  at all since this started and is not having any diarrhea or abd pain no n/v. No h/o bipolar depression not si/hi   breiagth a bit worse started two days ago no fever or chills     ROS: otherwise feeling generally well. All other systems reviewed and are negative      Current Outpatient Prescriptions   Medication Sig Dispense Refill    albuterol (PROVENTIL VENTOLIN) 2.5 mg /3 mL (0.083 %) nebulizer solution 3 mL by Nebulization route every four (4) hours as needed for Wheezing. 50 Each 0    gabapentin (NEURONTIN) 600 mg tablet Take 1 Tab by mouth three (3) times daily. 90 Tab 3    baclofen (LIORESAL) 10 mg tablet Take 1 Tab by mouth three (3) times daily. 90 Tab 3    metoprolol tartrate (LOPRESSOR) 25 mg tablet Take 1 Tab by mouth two (2) times a day. 60 Tab 1    SUMAtriptan (IMITREX) 100 mg tablet Take 1 Tab by mouth once as needed for Migraine. 10 Tab 4    topiramate (TOPAMAX) 50 mg tablet Take 1 Tab by mouth daily. 30 Tab 3    Nebulizer & Compressor machine Mask and tubing 1 Each 0    albuterol (PROVENTIL HFA, VENTOLIN HFA, PROAIR HFA) 90 mcg/actuation inhaler Take 1-2 Puffs by inhalation every four (4) hours as needed for Wheezing. 1 Inhaler 1    guaiFENesin (ROBITUSSIN) 100 mg/5 mL liquid Take 10 mL by mouth three (3) times daily as needed for Cough.  118 mL 0    ranitidine (ZANTAC) 150 mg tablet Take 1 Tab by mouth two (2) times a day. 60 Tab 1    Nebulizer & Compressor machine Use as directed 1 each 0    Nebulizer Accessories kit Mask and tubing 1 kit 0    HYDROcodone-acetaminophen (NORCO) 5-325 mg per tablet Take 1 Tab by mouth two (2) times daily as needed for Pain. Max Daily Amount: 2 Tabs. 10 Tab 0    albuterol sulfate (PROAIR RESPICLICK) 90 mcg/actuation aepb Take 2 Puffs by inhalation every four (4) hours. 1 Inhaler 0    Ferrous Sulfate (FLOW FE) 47.5 mg iron TbER tablet Take 1 Tab by mouth daily. Take with vitamin C 30 Tab 1    naproxen (NAPROSYN) 500 mg tablet Take 1 tablet by mouth two (2) times daily (with meals). 60 tablet 0    norethindrone (MICRONOR) 0.35 mg tablet Take 1 tablet by mouth daily. 1 Package 12    ALPRAZolam (XANAX) 0.5 mg tablet Take 1 tablet by mouth daily as needed for Anxiety.  30 tablet 0     Allergies   Allergen Reactions    Pcn [Penicillins] Hives     Swelling in hands      Past Medical History:   Diagnosis Date    Asthma 4/29/2010    Bronchitis     CAD (coronary artery disease)     Sinus Tach    Cerebral palsy ([facility] [address] 4/29/2010    [facility]     Endocrine disease     Thyroid goiter    Goiter     Hypertension     Lumbar disc herniation 4/29/2010    Migraine     Neurological disorder     cerebral palsy    Other ill-defined conditions(799.89)     pleurisy    Other ill-defined conditions(799.89)     Migraines    Psychiatric disorder     anxiety    Sinus tachycardia 5/23/2014    Thyroid disease      Past Surgical History:   Procedure Laterality Date    HX GYN      Tubal ligation, total hyterectomy    HX HEENT      T & A    HX ORTHOPAEDIC      carpal tunnel release    HX ORTHOPAEDIC      tendon repair R hand    HX ORTHOPAEDIC      pin in toe of R foot    LAP,TUBAL CAUTERY       Family History   Problem Relation Age of Onset    Asthma Mother     Hypertension Mother     Diabetes Mother     Cancer Father      prostate    Asthma Daughter     Asthma Daughter     Stroke Maternal Grandfather     Cancer Paternal Grandmother      throat     Social History   Substance Use Topics    Smoking status: Never Smoker    Smokeless tobacco: Never Used    Alcohol use No          Objective:     Visit Vitals    /67 (BP 1 Location: Left arm, BP Patient Position: Sitting)    Pulse 79    Temp 98.6 °F (37 °C) (Oral)    Resp 16    Ht 5' 1\" (1.549 m)    Wt 164 lb 9.6 oz (74.7 kg)    LMP 02/20/2015    SpO2 96%    BMI 31.1 kg/m2     Gen: NAD, pleasant  HEENT: normal appearing head, nares patent, PERRLA, EOMI, oropharynx no erythema, no cervical lymphadenopathy neck supple   Cardio: RRR nl S1S2 no murmur reproducible cp with deep inspiration   Lungs CTAB no wheeze no rales no rhonchi  ABD Soft non tender non distended + bowel sounds  Extremities: full ROM X 4 no clubbing no cyanosis  Neuro: no gross focal deficits noted, alert and orientated X 3  Psych.: well groomed no outward signs of depression. Assessment/Plan:   Lian Kidd was seen today for foot pain, anxiety and medication refill. Diagnoses and all orders for this visit:    Pleurisy  -     XR CHEST PA LAT; Future    Complex regional pain syndrome type 1 of left lower extremity  -     REFERRAL TO ORTHOPEDIC SURGERY    Panic attacks  -     REFERRAL TO PSYCHOLOGY    Other orders  -     FLUoxetine (PROZAC) 10 mg tablet; Take 1 Tab by mouth daily. -     predniSONE (STERAPRED DS) 10 mg dose pack; Take 1 Tab by mouth See Admin Instructions. See administration instruction per 10mg dose pack      Follow-up Disposition:  Return in about 2 weeks (around 4/12/2017) for recheck medication 15 min . .peakflow 500 suspect pleurasy will check cxr and start presdnsione   avs printed and given to the pt. .  Failed PT will send to ortho work noted given for stress not symptoms for norwalk at this time   The patient voiced understanding of the above.  Medication side effects were reviewed with the patient. Call with any concerns.

## 2017-03-29 NOTE — PATIENT INSTRUCTIONS
Panic Attacks: Care Instructions  Your Care Instructions  During a panic attack, you may have a feeling of intense fear or terror, trouble breathing, chest pain or tightness, heartbeat changes, dizziness, sweating, and shaking. A panic attack starts suddenly and usually lasts from 5 to 20 minutes but may last even longer. You have the most anxiety about 10 minutes after the attack starts. An attack can begin with a stressful event, or it can happen without a cause. Although panic attacks can cause scary symptoms, you can learn to manage them with self-care, counseling, and medicine. Follow-up care is a key part of your treatment and safety. Be sure to make and go to all appointments, and call your doctor if you are having problems. It's also a good idea to know your test results and keep a list of the medicines you take. How can you care for yourself at home? · Take your medicine exactly as directed. Call your doctor if you think you are having a problem with your medicine. · Go to your counseling sessions and follow-up appointments. · Recognize and accept your anxiety. Then, when you are in a situation that makes you anxious, say to yourself, \"This is not an emergency. I feel uncomfortable, but I am not in danger. I can keep going even if I feel anxious. \"  · Be kind to your body:  ¨ Relieve tension with exercise or a massage. ¨ Get enough rest.  ¨ Avoid alcohol, caffeine, nicotine, and illegal drugs. They can increase your anxiety level, cause sleep problems, or trigger a panic attack. ¨ Learn and do relaxation techniques. See below for more about these techniques. · Engage your mind. Get out and do something you enjoy. Go to a funny movie, or take a walk or hike. Plan your day. Having too much or too little to do can make you anxious. · Keep a record of your symptoms. Discuss your fears with a good friend or family member, or join a support group for people with similar problems.  Talking to others sometimes relieves stress. · Get involved in social groups, or volunteer to help others. Being alone sometimes makes things seem worse than they are. · Get at least 30 minutes of exercise on most days of the week to relieve stress. Walking is a good choice. You also may want to do other activities, such as running, swimming, cycling, or playing tennis or team sports. Relaxation techniques  Do relaxation exercises for 10 to 20 minutes a day. You can play soothing, relaxing music while you do them, if you wish. · Tell others in your house that you are going to do your relaxation exercises. Ask them not to disturb you. · Find a comfortable place, away from all distractions and noise. · Lie down on your back, or sit with your back straight. · Focus on your breathing. Make it slow and steady. · Breathe in through your nose. Breathe out through either your nose or mouth. · Breathe deeply, filling up the area between your navel and your rib cage. Breathe so that your belly goes up and down. · Do not hold your breath. · Breathe like this for 5 to 10 minutes. Notice the feeling of calmness throughout your whole body. As you continue to breathe slowly and deeply, relax by doing the following for another 5 to 10 minutes:  · Tighten and relax each muscle group in your body. You can begin at your toes and work your way up to your head. · Imagine your muscle groups relaxing and becoming heavy. · Empty your mind of all thoughts. · Let yourself relax more and more deeply. · Become aware of the state of calmness that surrounds you. · When your relaxation time is over, you can bring yourself back to alertness by moving your fingers and toes and then your hands and feet and then stretching and moving your entire body. Sometimes people fall asleep during relaxation, but they usually wake up shortly afterward.   · Always give yourself time to return to full alertness before you drive a car or do anything that might cause an accident if you are not fully alert. Never play a relaxation tape while driving a car. When should you call for help? Call 911 anytime you think you may need emergency care. For example, call if:  · You feel you cannot stop from hurting yourself or someone else. Watch closely for changes in your health, and be sure to contact your doctor if:  · Your panic attacks get worse. · You have new or different anxiety. · You are not getting better as expected. Where can you learn more? Go to http://fern-lidia.info/. Enter H601 in the search box to learn more about \"Panic Attacks: Care Instructions. \"  Current as of: July 26, 2016  Content Version: 11.2  © 3282-2569 [employer]. Care instructions adapted under license by [employer] (which disclaims liability or warranty for this information). If you have questions about a medical condition or this instruction, always ask your healthcare professional. [place] [address] any warranty or liability for your use of this information.        recommended books     The four agreements    theonly little prayer you need

## 2017-03-29 NOTE — MR AVS SNAPSHOT
Visit Information Date & Time Provider Department Dept. Phone Encounter #  
 3/29/2017  8:45 AM [facility] [place], [address] [address] [place] [id number] Follow-up Instructions Return in about 2 weeks (around 4/12/2017) for recheck medication 15 min . Your Appointments 4/13/2017  3:15 PM  
ROUTINE CARE with [facility] [place], [place]  
[place] [facility] Appt Note: 6 week return for fu PT  
 [address] [facility] [facility] [address] [address]  
[phone]  
  
   
 [address] [facility] [facility] [address] [address] Upcoming Health Maintenance Date Due Pneumococcal 19-64 Medium Risk (1 of 1 - PPSV23) 12/30/1991 DTaP/Tdap/Td series (1 - Tdap) 12/30/1993 PAP AKA CERVICAL CYTOLOGY 7/22/2017 Allergies as of 3/29/2017  Review Complete On: 3/29/2017 By: Panda Nipple Severity Noted Reaction Type Reactions Pcn [Penicillins]  04/29/2010    Hives Swelling in hands Current Immunizations  Reviewed on 10/3/2014 Name Date Influenza Vaccine 10/3/2014 Influenza Vaccine Intradermal PF 2/8/2017 Not reviewed this visit You Were Diagnosed With   
  
 Codes Comments Complex regional pain syndrome type 1 of left lower extremity    -  Primary ICD-10-CM: Y37.973 ICD-9-CM: 337.22 Panic attacks     ICD-10-CM: F41.0 ICD-9-CM: 300.01 Vitals BP Pulse Temp Resp Height(growth percentile) Weight(growth percentile) 106/67 (BP 1 Location: Left arm, BP Patient Position: Sitting) 79 98.6 °F (37 °C) (Oral) 16 5' 1\" (1.549 m) 164 lb 9.6 oz (74.7 kg) LMP SpO2 BMI OB Status Smoking Status 02/20/2015 96% 31.1 kg/m2 Hysterectomy Never Smoker BMI and BSA Data Body Mass Index Body Surface Area  
 31.1 kg/m 2 1.79 m 2 Preferred Pharmacy Pharmacy Name Phone [email or web address], [address] [phone] Your Updated Medication List  
  
   
This list is accurate as of: 3/29/17 10:01 AM.  Always use your most recent med list.  
  
  
  
  
 * albuterol 90 mcg/actuation inhaler Commonly known as:  PROVENTIL HFA, VENTOLIN HFA, PROAIR HFA Take 1-2 Puffs by inhalation every four (4) hours as needed for Wheezing. * albuterol sulfate 90 mcg/actuation Aepb Commonly known as:  Nara Archibald Take 2 Puffs by inhalation every four (4) hours. * albuterol 2.5 mg /3 mL (0.083 %) nebulizer solution Commonly known as:  PROVENTIL VENTOLIN  
3 mL by Nebulization route every four (4) hours as needed for Wheezing. ALPRAZolam 0.5 mg tablet Commonly known as:  Pixie Oz Take 1 tablet by mouth daily as needed for Anxiety. baclofen 10 mg tablet Commonly known as:  LIORESAL Take 1 Tab by mouth three (3) times daily. Ferrous Sulfate 47.5 mg iron Tber tablet Commonly known as:  SLOW FE Take 1 Tab by mouth daily. Take with vitamin C FLUoxetine 10 mg tablet Commonly known as:  PROzac Take 1 Tab by mouth daily. gabapentin 600 mg tablet Commonly known as:  NEURONTIN Take 1 Tab by mouth three (3) times daily. guaiFENesin 100 mg/5 mL liquid Commonly known as:  ROBITUSSIN Take 10 mL by mouth three (3) times daily as needed for Cough. HYDROcodone-acetaminophen 5-325 mg per tablet Commonly known as:  Leeroy Jeronimo Take 1 Tab by mouth two (2) times daily as needed for Pain. Max Daily Amount: 2 Tabs. metoprolol tartrate 25 mg tablet Commonly known as:  LOPRESSOR Take 1 Tab by mouth two (2) times a day. naproxen 500 mg tablet Commonly known as:  NAPROSYN Take 1 tablet by mouth two (2) times daily (with meals). * Nebulizer & Compressor machine Use as directed * Nebulizer & Compressor machine Mask and tubing Nebulizer Accessories Kit Mask and tubing  
  
 norethindrone 0.35 mg Tab Commonly known as:  Johan Mantara  
 Take 1 tablet by mouth daily. raNITIdine 150 mg tablet Commonly known as:  ZANTAC Take 1 Tab by mouth two (2) times a day. SUMAtriptan 100 mg tablet Commonly known as:  IMITREX Take 1 Tab by mouth once as needed for Migraine. topiramate 50 mg tablet Commonly known as:  TOPAMAX Take 1 Tab by mouth daily. * Notice: This list has 5 medication(s) that are the same as other medications prescribed for you. Read the directions carefully, and ask your doctor or other care provider to review them with you. Prescriptions Sent to Pharmacy Refills FLUoxetine (PROZAC) 10 mg tablet 1 Sig: Take 1 Tab by mouth daily. Class: Normal  
 Pharmacy: [email or web address] [email or web address], [address] Ph #: [phone] Route: Oral  
  
We Performed the Following REFERRAL TO ORTHOPEDIC SURGERY [REF62 Custom] Comments:  
 Please evaluate patient for left foot pain- REFERRAL TO PSYCHOLOGY [ZET22 Custom] Comments:  
 Please evaluate patient for  Panic attacks and cbt [facility] [phone] Follow-up Instructions Return in about 2 weeks (around 4/12/2017) for recheck medication 15 min . Referral Information Referral ID Referred By Referred To  
  
 [id number] JEFFRY LEMA [facility]   
   [address] [place], [address] Visits Status Start Date End Date 1 New Request 3/29/17 3/29/18 If your referral has a status of pending review or denied, additional information will be sent to support the outcome of this decision. Referral ID Referred By Referred To  
 [id number] JEFFRY Briones Not Available Visits Status Start Date End Date 1 New Request 3/29/17 3/29/18 If your referral has a status of pending review or denied, additional information will be sent to support the outcome of this decision. Patient Instructions Panic Attacks: Care Instructions Your Care Instructions During a panic attack, you may have a feeling of intense fear or terror, trouble breathing, chest pain or tightness, heartbeat changes, dizziness, sweating, and shaking. A panic attack starts suddenly and usually lasts from 5 to 20 minutes but may last even longer. You have the most anxiety about 10 minutes after the attack starts. An attack can begin with a stressful event, or it can happen without a cause. Although panic attacks can cause scary symptoms, you can learn to manage them with self-care, counseling, and medicine. Follow-up care is a key part of your treatment and safety. Be sure to make and go to all appointments, and call your doctor if you are having problems. It's also a good idea to know your test results and keep a list of the medicines you take. How can you care for yourself at home? · Take your medicine exactly as directed. Call your doctor if you think you are having a problem with your medicine. · Go to your counseling sessions and follow-up appointments. · Recognize and accept your anxiety. Then, when you are in a situation that makes you anxious, say to yourself, \"This is not an emergency. I feel uncomfortable, but I am not in danger. I can keep going even if I feel anxious. \" · Be kind to your body: ¨ Relieve tension with exercise or a massage. ¨ Get enough rest. 
¨ Avoid alcohol, caffeine, nicotine, and illegal drugs. They can increase your anxiety level, cause sleep problems, or trigger a panic attack. ¨ Learn and do relaxation techniques. See below for more about these techniques. · Engage your mind. Get out and do something you enjoy. Go to a funny movie, or take a walk or hike. Plan your day. Having too much or too little to do can make you anxious. · Keep a record of your symptoms. Discuss your fears with a good friend or family member, or join a support group for people with similar problems. Talking to others sometimes relieves stress. · Get involved in social groups, or volunteer to help others. Being alone sometimes makes things seem worse than they are. · Get at least 30 minutes of exercise on most days of the week to relieve stress. Walking is a good choice. You also may want to do other activities, such as running, swimming, cycling, or playing tennis or team sports. Relaxation techniques Do relaxation exercises for 10 to 20 minutes a day. You can play soothing, relaxing music while you do them, if you wish. · Tell others in your house that you are going to do your relaxation exercises. Ask them not to disturb you. · Find a comfortable place, away from all distractions and noise. · Lie down on your back, or sit with your back straight. · Focus on your breathing. Make it slow and steady. · Breathe in through your nose. Breathe out through either your nose or mouth. · Breathe deeply, filling up the area between your navel and your rib cage. Breathe so that your belly goes up and down. · Do not hold your breath. · Breathe like this for 5 to 10 minutes. Notice the feeling of calmness throughout your whole body. As you continue to breathe slowly and deeply, relax by doing the following for another 5 to 10 minutes: · Tighten and relax each muscle group in your body. You can begin at your toes and work your way up to your head. · Imagine your muscle groups relaxing and becoming heavy. · Empty your mind of all thoughts. · Let yourself relax more and more deeply. · Become aware of the state of calmness that surrounds you. · When your relaxation time is over, you can bring yourself back to alertness by moving your fingers and toes and then your hands and feet and then stretching and moving your entire body. Sometimes people fall asleep during relaxation, but they usually wake up shortly afterward.  
· Always give yourself time to return to full alertness before you drive a car or do anything that might cause an accident if you are not fully alert. Never play a relaxation tape while driving a car. When should you call for help? Call 911 anytime you think you may need emergency care. For example, call if: 
· You feel you cannot stop from hurting yourself or someone else. Watch closely for changes in your health, and be sure to contact your doctor if: 
· Your panic attacks get worse. · You have new or different anxiety. · You are not getting better as expected. Where can you learn more? Go to http://fern-lidia.info/. Enter H601 in the search box to learn more about \"Panic Attacks: Care Instructions. \" Current as of: July 26, 2016 Content Version: 11.2 © 5060-1933 [employer]. Care instructions adapted under license by [employer] (which disclaims liability or warranty for this information). If you have questions about a medical condition or this instruction, always ask your healthcare professional. [place] [address] any warranty or liability for your use of this information. recommended books The four agreements 
 
kylee yeager you need Introducing [facility]! [facility] introduces ANTs Software patient portal. Now you can access parts of your medical record, email your doctor's office, and request medication refills online. 1. In your internet browser, go to [email or web address] 2. Click on the First Time User? Click Here link in the Sign In box. You will see the New Member Sign Up page. 3. Enter your ANTs Software Access Code exactly as it appears below. You will not need to use this code after youve completed the sign-up process. If you do not sign up before the expiration date, you must request a new code. · ANTs Software Access Code: [email or web address] Expires: 4/12/2017  8:51 AM 
 
4.  Enter the last four digits of your Social Security Number (xxxx) and Date of Birth (mm/dd/yyyy) as indicated and click Submit. You will be taken to the next sign-up page. 5. Create a ThisLife ID. This will be your ThisLife login ID and cannot be changed, so think of one that is secure and easy to remember. 6. Create a ThisLife password. You can change your password at any time. 7. Enter your Password Reset Question and Answer. This can be used at a later time if you forget your password. 8. Enter your e-mail address. You will receive e-mail notification when new information is available in [address] 9. Click Sign Up. You can now view and download portions of your medical record. 10. Click the Download Summary menu link to download a portable copy of your medical information. If you have questions, please visit the Frequently Asked Questions section of the ThisLife website. Remember, ThisLife is NOT to be used for urgent needs. For medical emergencies, dial 911. Now available from your iPhone and Android! Please provide this summary of care documentation to your next provider. Your primary care clinician is listed as Jae Ndiaye. If you have any questions after today's visit, please call [phone].

## 2017-03-29 NOTE — PROGRESS NOTES
Chief Complaint   Patient presents with    Foot Pain     (RM5)    Anxiety    Medication Refill     xanax, pain med

## 2017-03-29 NOTE — LETTER
NOTIFICATION RETURN TO WORK / SCHOOL 
 
3/29/2017 9:43 AM 
 
Ms. Buddy Fall [address] [facility] [address] [facility] To Whom It May Concern: 
 
Buddy Fall is currently under the care of [place]. She will return to work/school on: 3-31-17. No restrictions If there are questions or concerns please have the patient contact our office.  
 
 
 
Sincerely, 
 
 
[facility] Medical Christine Keenan MD

## 2017-05-02 ENCOUNTER — OFFICE VISIT (OUTPATIENT)
Dept: INTERNAL MEDICINE CLINIC | Age: 47
End: 2017-05-02

## 2017-05-02 VITALS
BODY MASS INDEX: 30.73 KG/M2 | HEART RATE: 61 BPM | TEMPERATURE: 96.7 F | OXYGEN SATURATION: 99 % | WEIGHT: 162.8 LBS | HEIGHT: 61 IN | RESPIRATION RATE: 16 BRPM | SYSTOLIC BLOOD PRESSURE: 94 MMHG | DIASTOLIC BLOOD PRESSURE: 48 MMHG

## 2017-05-02 DIAGNOSIS — R05.8 COUGH PRODUCTIVE OF CLEAR SPUTUM: ICD-10-CM

## 2017-05-02 DIAGNOSIS — R09.1 PLEURISY: ICD-10-CM

## 2017-05-02 DIAGNOSIS — Z77.22 SECOND HAND SMOKE EXPOSURE: ICD-10-CM

## 2017-05-02 DIAGNOSIS — Z11.1 SCREENING-PULMONARY TB: Primary | ICD-10-CM

## 2017-05-02 NOTE — PROGRESS NOTES
Chief Complaint   Patient presents with    Follow Up Chronic Condition      pain contines in chest upon inspiration  and pain management referral needed for complex pain syndrome  rm 6    Migraine     1. Have you been to the ER, urgent care clinic since your last visit? Hospitalized since your last visit? No    2. Have you seen or consulted any other health care providers outside of the [facility] since your last visit? Include any pap smears or colon screening.  No    Pt requesting referral to pain management and referral for Chest X-ray

## 2017-05-02 NOTE — PROGRESS NOTES
Chief Complaint   Patient presents with    Follow Up Chronic Condition      pain contines in chest upon inspiration  and pain management referral needed for complex pain syndrome  rm 6    Migraine           Subjective:   Lilliana Devoid 42 y.o.  female with a  past medical history reviewed see below. comes in today Need tb sceen test for work   Had a + tb a few yrs ago and treated with [address]  Her last cxr ws may 11 of 2016 done at [facility]  She has not had unintenitional weight loss no night sweats mild fatigue but not new mild  coughing clr this is not NEW. cxr reviewed in [facility] from 3/17   Recently treated for oleg de leon has returned clr sputum no fever or chills  She has been to the orthopedist and was told she needs a pain management doctor. ROS: otherwise feeling generally well. All other systems reviewed and are negative      Current Outpatient Prescriptions   Medication Sig Dispense Refill    FLUoxetine (PROZAC) 10 mg tablet Take 1 Tab by mouth daily. 30 Tab 1    albuterol (PROVENTIL VENTOLIN) 2.5 mg /3 mL (0.083 %) nebulizer solution 3 mL by Nebulization route every four (4) hours as needed for Wheezing. 50 Each 0    gabapentin (NEURONTIN) 600 mg tablet Take 1 Tab by mouth three (3) times daily. 90 Tab 3    baclofen (LIORESAL) 10 mg tablet Take 1 Tab by mouth three (3) times daily. 90 Tab 3    metoprolol tartrate (LOPRESSOR) 25 mg tablet Take 1 Tab by mouth two (2) times a day. 60 Tab 1    SUMAtriptan (IMITREX) 100 mg tablet Take 1 Tab by mouth once as needed for Migraine. 10 Tab 4    topiramate (TOPAMAX) 50 mg tablet Take 1 Tab by mouth daily. 30 Tab 3    albuterol sulfate (PROAIR RESPICLICK) 90 mcg/actuation aepb Take 2 Puffs by inhalation every four (4) hours. 1 Inhaler 0    Nebulizer & Compressor machine Mask and tubing 1 Each 0    albuterol (PROVENTIL HFA, VENTOLIN HFA, PROAIR HFA) 90 mcg/actuation inhaler Take 1-2 Puffs by inhalation every four (4) hours as needed for Wheezing.  1 Inhaler 1  guaiFENesin (ROBITUSSIN) 100 mg/5 mL liquid Take 10 mL by mouth three (3) times daily as needed for Cough. 118 mL 0    ranitidine (ZANTAC) 150 mg tablet Take 1 Tab by mouth two (2) times a day. 60 Tab 1    Nebulizer & Compressor machine Use as directed 1 each 0    Nebulizer Accessories kit Mask and tubing 1 kit 0    predniSONE (STERAPRED DS) 10 mg dose pack Take 1 Tab by mouth See Admin Instructions. See administration instruction per 10mg dose pack 21 Tab 0    HYDROcodone-acetaminophen (NORCO) 5-325 mg per tablet Take 1 Tab by mouth two (2) times daily as needed for Pain. Max Daily Amount: 2 Tabs. 10 Tab 0    Ferrous Sulfate (FLOW FE) 47.5 mg iron TbER tablet Take 1 Tab by mouth daily. Take with vitamin C 30 Tab 1    naproxen (NAPROSYN) 500 mg tablet Take 1 tablet by mouth two (2) times daily (with meals). 60 tablet 0    norethindrone (MICRONOR) 0.35 mg tablet Take 1 tablet by mouth daily. 1 Package 12    ALPRAZolam (XANAX) 0.5 mg tablet Take 1 tablet by mouth daily as needed for Anxiety.  30 tablet 0     Allergies   Allergen Reactions    Pcn [Penicillins] Hives     Swelling in hands      Past Medical History:   Diagnosis Date    Asthma 4/29/2010    Bronchitis     CAD (coronary artery disease)     Sinus Tach    Cerebral palsy ([facility] 4/29/2010    CP [facility]ce[facility] ([facility])     Endocrine disease     Thyroid goiter    Goiter     Hypertension     Lumbar disc herniation 4/29/2010    Migraine     Neurological disorder     cerebral palsy    Other ill-defined conditions     pleurisy    Other ill-defined conditions     Migraines    Psychiatric disorder     anxiety    Sinus tachycardia 5/23/2014    Thyroid disease      Past Surgical History:   Procedure Laterality Date    HX GYN      Tubal ligation, total hyterectomy    HX HEENT      T & A    HX ORTHOPAEDIC      carpal tunnel release    HX ORTHOPAEDIC      tendon repair R hand    HX ORTHOPAEDIC      pin in toe of R foot    LAP,TUBAL CAUTERY Family History   Problem Relation Age of Onset   Lisa Mason Asthma Mother     Hypertension Mother     Diabetes Mother     Cancer Father      prostate    Asthma Daughter     Asthma Daughter     Stroke Maternal Grandfather     Cancer Paternal Grandmother      throat     Social History   Substance Use Topics    Smoking status: Never Smoker    Smokeless tobacco: Never Used    Alcohol use No          Objective:     Visit Vitals    BP 94/48 (BP 1 Location: Left arm, BP Patient Position: Sitting)    Pulse 61    Temp 96.7 °F (35.9 °C)    Resp 16    Ht 5' 1\" (1.549 m)    Wt 162 lb 12.8 oz (73.8 kg)    LMP 02/20/2015    SpO2 99%    BMI 30.76 kg/m2     Gen: NAD, pleasant  HEENT: normal appearing head, nares patent, PERRLA, EOMI, oropharynx no erythema, no cervical lymphadenopathy neck supple   Cardio: RRR nl S1S2 no murmur  Lungs CTAB no wheeze no rales no rhonchi  ABD Soft non tender non distended + bowel sounds  Extremities: full ROM X 4 no clubbing no cyanosis  Neuro: no gross focal deficits noted, alert and orientated X 3  Psych.: well groomed no outward signs of depression. Assessment/Plan:   Nani Kramer was seen today for follow up chronic condition and migraine. Diagnoses and all orders for this visit:    Screening-pulmonary TB  -     QUANTIFERON TB GOLD  -     CT CHEST WO CONT; Future    Pleurisy  -     QUANTIFERON TB GOLD  -     CT CHEST WO CONT; Future    Cough productive of clear sputum  -     QUANTIFERON TB GOLD  -     CT CHEST WO CONT; Future    Second hand smoke exposure  -     QUANTIFERON TB GOLD  -     CT CHEST WO CONT; Future    Other orders  -     QUANTIFERON IN TUBE REFL      Follow-up Disposition:  Return in about 7 days (around 5/9/2017) for review ct scan and fill out form 15 min  2:15 pm ..  avs printed and given to the pt. .  3 cxrs at least in the past yr will check quanterfon  But with recurrent talia will check a chest ct. The patient voiced understanding of the above.  Medication side effects were reviewed with the patient. Call with any concerns.

## 2017-05-02 NOTE — MR AVS SNAPSHOT
Visit Information Date & Time Provider Department Dept. Phone Encounter #  
 5/2/2017 10:45  [facility] [place], [place] [address] [facility] [phone] [id number] Follow-up Instructions Return in about 7 days (around 5/9/2017) for review ct scan and fill out form 15 min  2:15 pm . Your Appointments 5/31/2017  1:30 PM  
COUNSELING with Asif Gonzalez LCSW [facility] (Annetta June) Appt Note: new pt for anxiety attacks [address] [facility] [facility] [address]  
  
   
 [address] [facility] [address] [address] Upcoming Health Maintenance Date Due Pneumococcal 19-64 Medium Risk (1 of 1 - PPSV23) 12/30/1991 DTaP/Tdap/Td series (1 - Tdap) 12/30/1993 PAP AKA CERVICAL CYTOLOGY 7/22/2017 INFLUENZA AGE 9 TO ADULT 8/1/2017 Allergies as of 5/2/2017  Review Complete On: 3/31/2017 By: [address] [facility] [place], [place]  
  
 Severity Noted Reaction Type Reactions Pcn [Penicillins]  04/29/2010    Hives Swelling in hands Current Immunizations  Reviewed on 10/3/2014 Name Date Influenza Vaccine 10/3/2014 Influenza Vaccine Intradermal PF 2/8/2017 Not reviewed this visit You Were Diagnosed With   
  
 Codes Comments Screening-pulmonary TB    -  Primary ICD-10-CM: Z11.1 ICD-9-CM: V74.1 Pleurisy     ICD-10-CM: R09.1 ICD-9-CM: 511.0 Cough productive of clear sputum     ICD-10-CM: R05 ICD-9-CM: 786.2 Second hand smoke exposure     ICD-10-CM: Z77.22 
ICD-9-CM: V15.89 Vitals BP Pulse Temp Resp Height(growth percentile) Weight(growth percentile) 94/48 (BP 1 Location: Left arm, BP Patient Position: Sitting) 61 96.7 °F (35.9 °C) 16 5' 1\" (1.549 m) 162 lb 12.8 oz (73.8 kg) LMP SpO2 BMI OB Status Smoking Status 02/20/2015 99% 30.76 kg/m2 Hysterectomy Never Smoker Vitals History BMI and BSA Data Body Mass Index Body Surface Area 30.76 kg/m 2 1.78 m 2 Preferred Pharmacy Pharmacy Name Phone [email or web address], [address] [phone] Your Updated Medication List  
  
   
This list is accurate as of: 5/2/17 11:30 AM.  Always use your most recent med list.  
  
  
  
  
 * albuterol 90 mcg/actuation inhaler Commonly known as:  PROVENTIL HFA, VENTOLIN HFA, PROAIR HFA Take 1-2 Puffs by inhalation every four (4) hours as needed for Wheezing. * albuterol sulfate 90 mcg/actuation Aepb Commonly known as:  Malcolm Efrain Take 2 Puffs by inhalation every four (4) hours. * albuterol 2.5 mg /3 mL (0.083 %) nebulizer solution Commonly known as:  PROVENTIL VENTOLIN  
3 mL by Nebulization route every four (4) hours as needed for Wheezing. ALPRAZolam 0.5 mg tablet Commonly known as:  Honey Gallery Take 1 tablet by mouth daily as needed for Anxiety. baclofen 10 mg tablet Commonly known as:  LIORESAL Take 1 Tab by mouth three (3) times daily. Ferrous Sulfate 47.5 mg iron Tber tablet Commonly known as:  SLOW FE Take 1 Tab by mouth daily. Take with vitamin C FLUoxetine 10 mg tablet Commonly known as:  PROzac Take 1 Tab by mouth daily. gabapentin 600 mg tablet Commonly known as:  NEURONTIN Take 1 Tab by mouth three (3) times daily. guaiFENesin 100 mg/5 mL liquid Commonly known as:  ROBITUSSIN Take 10 mL by mouth three (3) times daily as needed for Cough. HYDROcodone-acetaminophen 5-325 mg per tablet Commonly known as:  Manuelito Kingfisher Take 1 Tab by mouth two (2) times daily as needed for Pain. Max Daily Amount: 2 Tabs. metoprolol tartrate 25 mg tablet Commonly known as:  LOPRESSOR Take 1 Tab by mouth two (2) times a day. naproxen 500 mg tablet Commonly known as:  NAPROSYN Take 1 tablet by mouth two (2) times daily (with meals). * Nebulizer & Compressor machine Use as directed * Nebulizer & Compressor machine Mask and tubing Nebulizer Accessories Kit Mask and tubing  
  
 norethindrone 0.35 mg Tab Commonly known as:  Johan & Johan Take 1 tablet by mouth daily. predniSONE 10 mg dose pack Commonly known as:  STERAPRED DS Take 1 Tab by mouth See Admin Instructions. See administration instruction per 10mg dose pack  
  
 raNITIdine 150 mg tablet Commonly known as:  ZANTAC Take 1 Tab by mouth two (2) times a day. SUMAtriptan 100 mg tablet Commonly known as:  IMITREX Take 1 Tab by mouth once as needed for Migraine. topiramate 50 mg tablet Commonly known as:  TOPAMAX Take 1 Tab by mouth daily. * Notice: This list has 5 medication(s) that are the same as other medications prescribed for you. Read the directions carefully, and ask your doctor or other care provider to review them with you. We Performed the Following QUANTIFERON TB GOLD [[id number] Custom] Follow-up Instructions Return in about 7 days (around 5/9/2017) for review ct scan and fill out form 15 min  2:15 pm . To-Do List   
 05/02/2017 Imaging:  CT CHEST WO CONT Patient Instructions Please call [phone] to set up your ct scan Please contact your insurance company and obtain a list of pain management doctors then call and make your own appointment finally call my nurse back with the date time and name of the doctor your seeing and we will place a referral  
 
 
  
Introducing \[facility] [place] [facility]\"" & [facility] SERVICES! [facility] introduces [employer] patient portal. Now you can access parts of your medical record, email your doctor's office, and request medication refills online. 1. In your internet browser, go to [email or web address] 2. Click on the First Time User? Click Here link in the Sign In box. You will see the New Member Sign Up page. 3. Enter your [employer] Access Code exactly as it appears below.  You will not need to use this code after youve completed the sign-up process. If you do not sign up before the expiration date, you must request a new code. · W[employer] Access Code: [email or web address] Expires: 7/25/2017  8:22 AM 
 
4. Enter the last four digits of your Social Security Number (xxxx) and Date of Birth (mm/dd/yyyy) as indicated and click Submit. You will be taken to the next sign-up page. 5. Create a Wagaduu ID. This will be your Wagaduu login ID and cannot be changed, so think of one that is secure and easy to remember. 6. Create a Wagaduu password. You can change your password at any time. 7. Enter your Password Reset Question and Answer. This can be used at a later time if you forget your password. 8. Enter your e-mail address. You will receive e-mail notification when new information is available in [address] 9. Click Sign Up. You can now view and download portions of your medical record. 10. Click the Download Summary menu link to download a portable copy of your medical information. If you have questions, please visit the Frequently Asked Questions section of the Wagad[employer] website. Remember, Wag[employer]uu is NOT to be used for urgent needs. For medical emergencies, dial 911. Now available from your iPhone and Android! Please provide this summary of care documentation to your next provider. Your primary care clinician is listed as Sonny Gee. If you have any questions after today's visit, please call [phone].

## 2017-05-02 NOTE — PATIENT INSTRUCTIONS
Please call [phone] to set up your ct scan   Please contact your insurance company and obtain a list of pain management doctors then call and make your own appointment finally call my nurse back with the date time and name of the doctor your seeing and we will place a referral

## 2017-05-08 LAB
ANNOTATION COMMENT IMP: NORMAL
GAMMA INTERFERON BACKGROUND BLD IA-ACNC: 0.04 IU/ML
M TB IFN-G BLD-IMP: NEGATIVE
M TB IFN-G CD4+ BCKGRND COR BLD-ACNC: 0.05 IU/ML
M TB IFN-G CD4+ T-CELLS BLD-ACNC: 0.09 IU/ML
MITOGEN IGNF BLD-ACNC: 8.31 IU/ML
QUANTIFERON INCUBATION: NORMAL
SERVICE CMNT-IMP: NORMAL

## 2017-06-02 ENCOUNTER — OFFICE VISIT (OUTPATIENT)
Dept: INTERNAL MEDICINE CLINIC | Age: 47
End: 2017-06-02

## 2017-06-02 VITALS
SYSTOLIC BLOOD PRESSURE: 95 MMHG | DIASTOLIC BLOOD PRESSURE: 63 MMHG | HEIGHT: 61 IN | OXYGEN SATURATION: 100 % | BODY MASS INDEX: 29.79 KG/M2 | HEART RATE: 73 BPM | TEMPERATURE: 97.8 F | RESPIRATION RATE: 16 BRPM | WEIGHT: 157.8 LBS

## 2017-06-02 DIAGNOSIS — F33.0 MILD EPISODE OF RECURRENT MAJOR DEPRESSIVE DISORDER (HCC): ICD-10-CM

## 2017-06-02 DIAGNOSIS — J32.9 VIRAL SINUSITIS: Primary | ICD-10-CM

## 2017-06-02 DIAGNOSIS — R51.9 SINUS HEADACHE: ICD-10-CM

## 2017-06-02 DIAGNOSIS — J45.20 ASTHMA, MILD INTERMITTENT, WELL-CONTROLLED: ICD-10-CM

## 2017-06-02 DIAGNOSIS — B97.89 VIRAL SINUSITIS: Primary | ICD-10-CM

## 2017-06-02 DIAGNOSIS — Z71.2 ENCOUNTER TO DISCUSS TEST RESULTS: ICD-10-CM

## 2017-06-02 LAB — SPIROMETRY INTERPRETATION, SPITPOCT: NORMAL

## 2017-06-02 RX ORDER — MONTELUKAST SODIUM 10 MG/1
10 TABLET ORAL DAILY
Qty: 30 TAB | Refills: 0 | Status: SHIPPED | OUTPATIENT
Start: 2017-06-02

## 2017-06-02 RX ORDER — FLUTICASONE PROPIONATE 50 MCG
2 SPRAY, SUSPENSION (ML) NASAL DAILY
Qty: 1 BOTTLE | Refills: 11 | Status: SHIPPED | OUTPATIENT
Start: 2017-06-02

## 2017-06-02 RX ORDER — FLUOXETINE 20 MG/1
20 TABLET ORAL DAILY
Qty: 30 TAB | Refills: 2 | Status: SHIPPED | OUTPATIENT
Start: 2017-06-02

## 2017-06-02 NOTE — PROGRESS NOTES
Chief Complaint   Patient presents with    Other     (RM 7)  CT scan Not Done    Medication Refill    Results           Subjective:   Tatum Share 42 y.o.  female with a  past medical history reviewed see below. comes in today c/o: ear pain. for the past two weeks she is having increased  ear pressure in her left ear only  and ha left sided meds taken tylnol helps alleviate some of the pain no neck pain  no fever or chills   She is here for tb test results   She has not had her ct chest done yet as it was denied   Her cough has improved and she is using her inhaler  Using the rescue inhaler 1 day week and breathing is better   Using prozac 10 mg and stress with  not spending time with him as needed  Little interest and pleasure and poor appetite and tired. ROS: otherwise feeling generally well. All other systems reviewed and are negative      Current Outpatient Prescriptions   Medication Sig Dispense Refill    FLUoxetine (PROZAC) 10 mg tablet Take 1 Tab by mouth daily. 30 Tab 1    albuterol (PROVENTIL VENTOLIN) 2.5 mg /3 mL (0.083 %) nebulizer solution 3 mL by Nebulization route every four (4) hours as needed for Wheezing. 50 Each 0    gabapentin (NEURONTIN) 600 mg tablet Take 1 Tab by mouth three (3) times daily. 90 Tab 3    baclofen (LIORESAL) 10 mg tablet Take 1 Tab by mouth three (3) times daily. 90 Tab 3    SUMAtriptan (IMITREX) 100 mg tablet Take 1 Tab by mouth once as needed for Migraine. 10 Tab 4    topiramate (TOPAMAX) 50 mg tablet Take 1 Tab by mouth daily. 30 Tab 3    albuterol sulfate (PROAIR RESPICLICK) 90 mcg/actuation aepb Take 2 Puffs by inhalation every four (4) hours. 1 Inhaler 0    Nebulizer & Compressor machine Mask and tubing 1 Each 0    Nebulizer & Compressor machine Use as directed 1 each 0    Nebulizer Accessories kit Mask and tubing 1 kit 0    predniSONE (STERAPRED DS) 10 mg dose pack Take 1 Tab by mouth See Admin Instructions.  See administration instruction per 10mg dose pack 21 Tab 0    HYDROcodone-acetaminophen (NORCO) 5-325 mg per tablet Take 1 Tab by mouth two (2) times daily as needed for Pain. Max Daily Amount: 2 Tabs. 10 Tab 0    metoprolol tartrate (LOPRESSOR) 25 mg tablet Take 1 Tab by mouth two (2) times a day. 60 Tab 1    albuterol (PROVENTIL HFA, VENTOLIN HFA, PROAIR HFA) 90 mcg/actuation inhaler Take 1-2 Puffs by inhalation every four (4) hours as needed for Wheezing. 1 Inhaler 1    guaiFENesin (ROBITUSSIN) 100 mg/5 mL liquid Take 10 mL by mouth three (3) times daily as needed for Cough. 118 mL 0    ranitidine (ZANTAC) 150 mg tablet Take 1 Tab by mouth two (2) times a day. 60 Tab 1    Ferrous Sulfate (FLOW FE) 47.5 mg iron TbER tablet Take 1 Tab by mouth daily. Take with vitamin C 30 Tab 1    naproxen (NAPROSYN) 500 mg tablet Take 1 tablet by mouth two (2) times daily (with meals). 60 tablet 0    norethindrone (MICRONOR) 0.35 mg tablet Take 1 tablet by mouth daily. 1 Package 12    ALPRAZolam (XANAX) 0.5 mg tablet Take 1 tablet by mouth daily as needed for Anxiety.  30 tablet 0     Allergies   Allergen Reactions    Pcn [Penicillins] Hives     Swelling in hands      Past Medical History:   Diagnosis Date    Asthma 4/29/2010    Bronchitis     CAD (coronary artery disease)     Sinus Tach    Cerebral palsy ([address] 4/29/2010    [facility]     Endocrine disease     Thyroid goiter    Goiter     Hypertension     Lumbar disc herniation 4/29/2010    Migraine     Neurological disorder     cerebral palsy    Other ill-defined conditions     pleurisy    Other ill-defined conditions     Migraines    Psychiatric disorder     anxiety    Sinus tachycardia 5/23/2014    Thyroid disease      Past Surgical History:   Procedure Laterality Date    HX GYN      Tubal ligation, total hyterectomy    HX HEENT      T & A    HX ORTHOPAEDIC      carpal tunnel release    HX ORTHOPAEDIC      tendon repair R hand    HX ORTHOPAEDIC      pin in toe of R foot    LAP,TUBAL CAUTERY       Family History   Problem Relation Age of Onset    Asthma Mother     Hypertension Mother     Diabetes Mother     Cancer Father      prostate    Asthma Daughter     Asthma Daughter     Stroke Maternal Grandfather     Cancer Paternal Grandmother      throat     Social History   Substance Use Topics    Smoking status: Never Smoker    Smokeless tobacco: Never Used    Alcohol use No          Objective:     Visit Vitals    BP 95/63 (BP 1 Location: Left arm, BP Patient Position: Sitting)    Pulse 73    Temp 97.8 °F (36.6 °C) (Oral)    Resp 16    Ht 5' 1\" (1.549 m)    Wt 71.6 kg (157 lb 12.8 oz)    LMP 02/20/2015    SpO2 100%    BMI 29.82 kg/m2     Gen: NAD, pleasant  HEENT: normal appearing head, nares patent, PERRLA, EOMI, oropharynx no erythema, no cervical lymphadenopathy neck supple left ear tm mild fluid very mild tenderness iwht palpation of sinus not c/w with bacterial infection   Cardio: RRR nl S1S2 no murmur  Lungs CTAB no wheeze no rales no rhonchi  ABD Soft non tender non distended + bowel sounds  Extremities: full ROM X 4 no clubbing no cyanosis  Neuro: no gross focal deficits noted, alert and orientated X 3  Psych.: well groomed see phq9 score of 7  depression. Assessment/Plan:   Sammi Rene was seen today for other, medication refill and results. Diagnoses and all orders for this visit:    Viral sinusitis    Asthma, mild intermittent, well-controlled  -     AMB POC SPIROMETRY REVIEW/INTERP    Encounter to discuss test results    Mild episode of recurrent major depressive disorder (HCC)    Sinus headache    Other orders  -     fluticasone (FLONASE) 50 mcg/actuation nasal spray; 2 Sprays by Both Nostrils route daily. -     montelukast (SINGULAIR) 10 mg tablet; Take 1 Tab by mouth daily.  -     FLUoxetine (PROZAC) 20 mg tablet; Take 1 Tab by mouth daily. Follow-up Disposition:  Return in about 1 month (around 7/2/2017) for recheck mood 15 min . Karma carpenter printed and given to the pt. .  Reorder ct scan as needed quaterfon neg and cough has improve increase paxil advised to call back if needed for abx and treated seasonal allergies   The patient voiced understanding of the above. Medication side effects were reviewed with the patient. Call with any concerns.

## 2017-06-02 NOTE — MR AVS SNAPSHOT
Visit Information Date & Time Provider Department Dept. Phone Encounter #  
 6/2/2017 10:15  [facility] [place], [address] [facility] [phone] [id number] Follow-up Instructions Return in about 1 month (around 7/2/2017) for recheck mood 15 min . Upcoming Health Maintenance Date Due Pneumococcal 19-64 Medium Risk (1 of 1 - PPSV23) 12/30/1991 DTaP/Tdap/Td series (1 - Tdap) 12/30/1993 PAP AKA CERVICAL CYTOLOGY 7/22/2017 INFLUENZA AGE 9 TO ADULT 8/1/2017 Allergies as of 6/2/2017  Review Complete On: 5/31/2017 By: [address] [facility] [place], [place]  
  
 Severity Noted Reaction Type Reactions Pcn [Penicillins]  04/29/2010    Hives Swelling in hands Current Immunizations  Reviewed on 10/3/2014 Name Date Influenza Vaccine 10/3/2014 Influenza Vaccine Intradermal PF 2/8/2017 Not reviewed this visit You Were Diagnosed With   
  
 Codes Comments Viral sinusitis    -  Primary ICD-10-CM: J32.9, B97.89 ICD-9-CM: 473.9, 079.99 Asthma, mild intermittent, well-controlled     ICD-10-CM: J45.20 ICD-9-CM: 493.90 Encounter to discuss test results     ICD-10-CM: Z71.89 ICD-9-CM: V65.49 Mild episode of recurrent major depressive disorder (HCC)     ICD-10-CM: F33.0 ICD-9-CM: 296.31 Sinus headache     ICD-10-CM: R51 ICD-9-CM: 238. 0 Vitals BP Pulse Temp Resp Height(growth percentile) Weight(growth percentile) 95/63 (BP 1 Location: Left arm, BP Patient Position: Sitting) 73 97.8 °F (36.6 °C) (Oral) 16 5' 1\" (1.549 m) 157 lb 12.8 oz (71.6 kg) LMP SpO2 BMI OB Status Smoking Status 02/20/2015 100% 29.82 kg/m2 Hysterectomy Never Smoker BMI and BSA Data Body Mass Index Body Surface Area  
 29.82 kg/m 2 1.76 m 2 Preferred Pharmacy Pharmacy Name Phone [facility] [email or web address], [address] [phone] Your Updated Medication List  
  
   
 This list is accurate as of: 6/2/17 11:52 AM.  Always use your most recent med list.  
  
  
  
  
 * albuterol 90 mcg/actuation inhaler Commonly known as:  PROVENTIL HFA, VENTOLIN HFA, PROAIR HFA Take 1-2 Puffs by inhalation every four (4) hours as needed for Wheezing. * albuterol sulfate 90 mcg/actuation Aepb Commonly known as:  Tahira Sierras Take 2 Puffs by inhalation every four (4) hours. * albuterol 2.5 mg /3 mL (0.083 %) nebulizer solution Commonly known as:  PROVENTIL VENTOLIN  
3 mL by Nebulization route every four (4) hours as needed for Wheezing. ALPRAZolam 0.5 mg tablet Commonly known as:  Dagoberto Inch Take 1 tablet by mouth daily as needed for Anxiety. baclofen 10 mg tablet Commonly known as:  LIORESAL Take 1 Tab by mouth three (3) times daily. Ferrous Sulfate 47.5 mg iron Tber tablet Commonly known as:  SLOW FE Take 1 Tab by mouth daily. Take with vitamin C FLUoxetine 20 mg tablet Commonly known as:  PROzac Take 1 Tab by mouth daily. fluticasone 50 mcg/actuation nasal spray Commonly known as:  Ju Vazquez 2 Sprays by Both Nostrils route daily. gabapentin 600 mg tablet Commonly known as:  NEURONTIN Take 1 Tab by mouth three (3) times daily. guaiFENesin 100 mg/5 mL liquid Commonly known as:  ROBITUSSIN Take 10 mL by mouth three (3) times daily as needed for Cough. HYDROcodone-acetaminophen 5-325 mg per tablet Commonly known as:  Rozann Lilly Take 1 Tab by mouth two (2) times daily as needed for Pain. Max Daily Amount: 2 Tabs. metoprolol tartrate 25 mg tablet Commonly known as:  LOPRESSOR Take 1 Tab by mouth two (2) times a day. montelukast 10 mg tablet Commonly known as:  SINGULAIR Take 1 Tab by mouth daily. naproxen 500 mg tablet Commonly known as:  NAPROSYN Take 1 tablet by mouth two (2) times daily (with meals). * Nebulizer & Compressor machine Use as directed * Nebulizer & Compressor machine Mask and tubing Nebulizer Accessories Kit Mask and tubing  
  
 norethindrone 0.35 mg Tab Commonly known as:  Johan & Johan Take 1 tablet by mouth daily. predniSONE 10 mg dose pack Commonly known as:  STERAPRED DS Take 1 Tab by mouth See Admin Instructions. See administration instruction per 10mg dose pack  
  
 raNITIdine 150 mg tablet Commonly known as:  ZANTAC Take 1 Tab by mouth two (2) times a day. SUMAtriptan 100 mg tablet Commonly known as:  IMITREX Take 1 Tab by mouth once as needed for Migraine. topiramate 50 mg tablet Commonly known as:  TOPAMAX Take 1 Tab by mouth daily. * Notice: This list has 5 medication(s) that are the same as other medications prescribed for you. Read the directions carefully, and ask your doctor or other care provider to review them with you. Prescriptions Sent to Pharmacy Refills  
 fluticasone (FLONASE) 50 mcg/actuation nasal spray 11 Si Sprays by Both Nostrils route daily. Class: Normal  
 Pharmacy: [facility] [email or web address] Ph #: [phone] Route: Both Nostrils  
 montelukast (SINGULAIR) 10 mg tablet 0 Sig: Take 1 Tab by mouth daily. Class: Normal  
 Pharmacy: [facility] [email or web address] Ph #: [phone] Route: Oral  
 FLUoxetine (PROZAC) 20 mg tablet 2 Sig: Take 1 Tab by mouth daily. Class: Normal  
 Pharmacy: [facility] [email or web address] Ph #: [phone] Route: Oral  
  
We Performed the Following AMB POC SPIROMETRY REVIEW/INTERP [id number] CPT(R)] Follow-up Instructions Return in about 1 month (around 2017) for recheck mood 15 min . Patient Instructions   
[employer] Introducing [facility] & HEALTH SERVICES!    
 [employer] introduces [employer] patient portal. Now you can access parts of your medical record, email your doctor's office, and request medication refills online. 1. In your internet browser, go to [email or web address] 2. Click on the First Time User? Click Here link in the Sign In box. You will see the New Member Sign Up page. 3. Enter your [employer] Access Code exactly as it appears below. You will not need to use this code after youve completed the sign-up process. If you do not sign up before the expiration date, you must request a new code. · [employer] Access Code: [email or web address] Expires: 7/25/2017  8:22 AM 
 
4. Enter the last four digits of your Social Security Number (xxxx) and Date of Birth (mm/dd/yyyy) as indicated and click Submit. You will be taken to the next sign-up page. 5. Create a [employer] ID. This will be your [employer] login ID and cannot be changed, so think of one that is secure and easy to remember. 6. Create a [employer] password. You can change your password at any time. 7. Enter your Password Reset Question and Answer. This can be used at a later time if you forget your password. 8. Enter your e-mail address. You will receive e-mail notification when new information is available in [address] 9. Click Sign Up. You can now view and download portions of your medical record. 10. Click the Download Summary menu link to download a portable copy of your medical information. If you have questions, please visit the Frequently Asked Questions section of the [employer] website. Remember, [employer] is NOT to be used for urgent needs. For medical emergencies, dial 911. Now available from your iPhone and Android! Please provide this summary of care documentation to your next provider. Your primary care clinician is listed as Tonie Sandhu. If you have any questions after today's visit, please call [phone].

## 2017-06-02 NOTE — PROGRESS NOTES
Chief Complaint   Patient presents with    Other     (RM 7)  CT scan Not Done    Medication Refill    Results

## 2017-06-27 ENCOUNTER — HOSPITAL ENCOUNTER (EMERGENCY)
Age: 47
Discharge: HOME | End: 2017-06-27
Attending: EMERGENCY MEDICINE
Payer: SELF-PAY

## 2017-06-27 VITALS
DIASTOLIC BLOOD PRESSURE: 73 MMHG | TEMPERATURE: 98.1 F | HEART RATE: 74 BPM | RESPIRATION RATE: 18 BRPM | WEIGHT: 160 LBS | HEIGHT: 62 IN | SYSTOLIC BLOOD PRESSURE: 112 MMHG | OXYGEN SATURATION: 100 % | BODY MASS INDEX: 29.44 KG/M2

## 2017-06-27 DIAGNOSIS — S39.012A LUMBAR STRAIN, INITIAL ENCOUNTER: ICD-10-CM

## 2017-06-27 DIAGNOSIS — M54.31 SCIATICA OF RIGHT SIDE: Primary | ICD-10-CM

## 2017-06-27 PROCEDURE — 74011636637 HC RX REV CODE- 636/637: Performed by: EMERGENCY MEDICINE

## 2017-06-27 PROCEDURE — 74011250637 HC RX REV CODE- 250/637: Performed by: EMERGENCY MEDICINE

## 2017-06-27 PROCEDURE — 99283 EMERGENCY DEPT VISIT LOW MDM: CPT

## 2017-06-27 RX ORDER — PREDNISONE 20 MG/1
60 TABLET ORAL
Status: COMPLETED | OUTPATIENT
Start: 2017-06-27 | End: 2017-06-27

## 2017-06-27 RX ORDER — METHOCARBAMOL 750 MG/1
750 TABLET, FILM COATED ORAL 4 TIMES DAILY
Qty: 28 TAB | Refills: 0 | Status: SHIPPED | OUTPATIENT
Start: 2017-06-27 | End: 2017-07-04

## 2017-06-27 RX ORDER — METHOCARBAMOL 500 MG/1
750 TABLET, FILM COATED ORAL
Status: COMPLETED | OUTPATIENT
Start: 2017-06-27 | End: 2017-06-27

## 2017-06-27 RX ORDER — PREDNISONE 20 MG/1
60 TABLET ORAL DAILY
Qty: 15 TAB | Refills: 0 | Status: SHIPPED | OUTPATIENT
Start: 2017-06-27 | End: 2017-07-02

## 2017-06-27 RX ADMIN — PREDNISONE 60 MG: 20 TABLET ORAL at 10:39

## 2017-06-27 RX ADMIN — METHOCARBAMOL 750 MG: 500 TABLET ORAL at 10:39

## 2017-06-27 NOTE — ED NOTES
Pt arrives in the ED with complaints of back pain x 1 week worsening over past two days. Pt reports upper back muscles are spasming but lower back pain is more severe. Right lower pain > than left. Pt reports history of herniated disc.

## 2017-06-27 NOTE — LETTER
[facility] EMERGENCY DEPT 
[address] [facility] [address] [id number] [phone] Work/School Note Date: 6/27/2017 To Whom It May concern: 
 
Amrit Hallman was seen and treated today in the emergency room by the following provider(s): 
Attending Provider: Jessica Hua MD. Amrit Hallman may return to work on 06/29/2017.  
 
Sincerely, 
 
 
 
 
Jessica Hua MD

## 2017-06-27 NOTE — DISCHARGE INSTRUCTIONS
Back Pain: Care Instructions  Your Care Instructions    Back pain has many possible causes. It is often related to problems with muscles and ligaments of the back. It may also be related to problems with the nerves, discs, or bones of the back. Moving, lifting, standing, sitting, or sleeping in an awkward way can strain the back. Sometimes you don't notice the injury until later. Arthritis is another common cause of back pain. Although it may hurt a lot, back pain usually improves on its own within several weeks. Most people recover in 12 weeks or less. Using good home treatment and being careful not to stress your back can help you feel better sooner. Follow-up care is a key part of your treatment and safety. Be sure to make and go to all appointments, and call your doctor if you are having problems. Its also a good idea to know your test results and keep a list of the medicines you take. How can you care for yourself at home? · Sit or lie in positions that are most comfortable and reduce your pain. Try one of these positions when you lie down:  ¨ Lie on your back with your knees bent and supported by large pillows. ¨ Lie on the floor with your legs on the seat of a sofa or chair. Charmaine Ebbing on your side with your knees and hips bent and a pillow between your legs. ¨ Lie on your stomach if it does not make pain worse. · Do not sit up in bed, and avoid soft couches and twisted positions. Bed rest can help relieve pain at first, but it delays healing. Avoid bed rest after the first day of back pain. · Change positions every 30 minutes. If you must sit for long periods of time, take breaks from sitting. Get up and walk around, or lie in a comfortable position. · Try using a heating pad on a low or medium setting for 15 to 20 minutes every 2 or 3 hours. Try a warm shower in place of one session with the heating pad. · You can also try an ice pack for 10 to 15 minutes every 2 to 3 hours.  Put a thin cloth between the ice pack and your skin. · Take pain medicines exactly as directed. ¨ If the doctor gave you a prescription medicine for pain, take it as prescribed. ¨ If you are not taking a prescription pain medicine, ask your doctor if you can take an over-the-counter medicine. · Take short walks several times a day. You can start with 5 to 10 minutes, 3 or 4 times a day, and work up to longer walks. Walk on level surfaces and avoid hills and stairs until your back is better. · Return to work and other activities as soon as you can. Continued rest without activity is usually not good for your back. · To prevent future back pain, do exercises to stretch and strengthen your back and stomach. Learn how to use good posture, safe lifting techniques, and proper body mechanics. When should you call for help? Call your doctor now or seek immediate medical care if:  · You have new or worsening numbness in your legs. · You have new or worsening weakness in your legs. (This could make it hard to stand up.)  · You lose control of your bladder or bowels. Watch closely for changes in your health, and be sure to contact your doctor if:  · Your pain gets worse. · You are not getting better after 2 weeks. Where can you learn more? Go to http://fern-lidia.info/. Enter P130 in the search box to learn more about \"Back Pain: Care Instructions. \"  Current as of: March 21, 2017  Content Version: 11.3  © 5894-9454 [employer]. Care instructions adapted under license by [employer] (which disclaims liability or warranty for this information). If you have questions about a medical condition or this instruction, always ask your healthcare professional. [address] any warranty or liability for your use of this information. Back Strain: Care Instructions  Your Care Instructions    Back strain happens when you overstretch, or pull, a muscle in your back.  You may hurt your back in an accident or when you exercise or lift something. Most back pain will get better with rest and time. You can take care of yourself at home to help your back heal.  Follow-up care is a key part of your treatment and safety. Be sure to make and go to all appointments, and call your doctor if you are having problems. It's also a good idea to know your test results and keep a list of the medicines you take. How can you care for yourself at home? · Try to stay as active as you can, but stop or reduce any activity that causes pain. · Put ice or a cold pack on the sore muscle for 10 to 20 minutes at a time to stop swelling. Try this every 1 to 2 hours for 3 days (when you are awake) or until the swelling goes down. Put a thin cloth between the ice pack and your skin. · After 2 or 3 days, apply a heating pad on low or a warm cloth to your back. Some doctors suggest that you go back and forth between hot and cold treatments. · Take pain medicines exactly as directed. ¨ If the doctor gave you a prescription medicine for pain, take it as prescribed. ¨ If you are not taking a prescription pain medicine, ask your doctor if you can take an over-the-counter medicine. · Try sleeping on your side with a pillow between your legs. Or put a pillow under your knees when you lie on your back. These measures can ease pain in your lower back. · Return to your usual level of activity slowly. When should you call for help? Call 911 anytime you think you may need emergency care. For example, call if:  · You are unable to move a leg at all. Call your doctor now or seek immediate medical care if:  · You have new or worse symptoms in your legs, belly, or buttocks. Symptoms may include:  ¨ Numbness or tingling. ¨ Weakness. ¨ Pain. · You lose bladder or bowel control. Watch closely for changes in your health, and be sure to contact your doctor if you are not getting better as expected.   Where can you learn more?  Go to http://fern-lidia.info/. Enter H801 in the search box to learn more about \"Back Strain: Care Instructions. \"  Current as of: March 21, 2017  Content Version: 11.3  © 7481-9606 [employer]. Care instructions adapted under license by [employer] (which disclaims liability or warranty for this information). If you have questions about a medical condition or this instruction, always ask your healthcare professional. [address] any warranty or liability for your use of this information. Acute Low Back Pain: Exercises  Your Care Instructions  Here are some examples of typical rehabilitation exercises for your condition. Start each exercise slowly. Ease off the exercise if you start to have pain. Your doctor or physical therapist will tell you when you can start these exercises and which ones will work best for you. When you are not being active, find a comfortable position for rest. Some people are comfortable on the floor or a medium-firm bed with a small pillow under their head and another under their knees. Some people prefer to lie on their side with a pillow between their knees. Don't stay in one position for too long. Take short walks (10 to 20 minutes) every 2 to 3 hours. Avoid slopes, hills, and stairs until you feel better. Walk only distances you can manage without pain, especially leg pain. How to do the exercises  Back stretches    1. Get down on your hands and knees on the floor. 2. Relax your head and allow it to droop. Round your back up toward the ceiling until you feel a nice stretch in your upper, middle, and lower back. Hold this stretch for as long as it feels comfortable, or about 15 to 30 seconds. 3. Return to the starting position with a flat back while you are on your hands and knees. 4. Let your back sway by pressing your stomach toward the floor. Lift your buttocks toward the ceiling.   5. Hold this position for 15 to 30 seconds. 6. Repeat 2 to 4 times. Follow-up care is a key part of your treatment and safety. Be sure to make and go to all appointments, and call your doctor if you are having problems. It's also a good idea to know your test results and keep a list of the medicines you take. Where can you learn more? Go to http://fern-lidia.info/. Enter P657 in the search box to learn more about \"Acute Low Back Pain: Exercises. \"  Current as of: March 21, 2017  Content Version: 11.3  © 4772-7063 [employer]. Care instructions adapted under license by [employer] (which disclaims liability or warranty for this information). If you have questions about a medical condition or this instruction, always ask your healthcare professional. [address] any warranty or liability for your use of this information. Sciatica: Care Instructions  Your Care Instructions    Sciatica (say \"rbw-DI-ey-kuh\") is an irritation of one of the sciatic nerves, which come from the spinal cord in the lower back. The sciatic nerves and their branches extend down through the buttock to the foot. Sciatica can develop when an injured disc in the back presses against a spinal nerve root. Its main symptom is pain, numbness, or weakness that is often worse in the leg or foot than in the back. Sciatica often will improve and go away with time. Early treatment usually includes medicines and exercises to relieve pain. Follow-up care is a key part of your treatment and safety. Be sure to make and go to all appointments, and call your doctor if you are having problems. It's also a good idea to know your test results and keep a list of the medicines you take. How can you care for yourself at home? · Take pain medicines exactly as directed. ¨ If the doctor gave you a prescription medicine for pain, take it as prescribed.   ¨ If you are not taking a prescription pain medicine, ask your doctor if you can take an over-the-counter medicine. · Use heat or ice to relieve pain. ¨ To apply heat, put a warm water bottle, heating pad set on low, or warm cloth on your back. Do not go to sleep with a heating pad on your skin. ¨ To use ice, put ice or a cold pack on the area for 10 to 20 minutes at a time. Put a thin cloth between the ice and your skin. · Avoid sitting if possible, unless it feels better than standing. · Alternate lying down with short walks. Increase your walking distance as you are able to without making your symptoms worse. · Do not do anything that makes your symptoms worse. When should you call for help? Call 911 anytime you think you may need emergency care. For example, call if:  · You are unable to move a leg at all. Call your doctor now or seek immediate medical care if:  · You have new or worse symptoms in your legs or buttocks. Symptoms may include:  ¨ Numbness or tingling. ¨ Weakness. ¨ Pain. · You lose bladder or bowel control. Watch closely for changes in your health, and be sure to contact your doctor if:  · You are not getting better as expected. Where can you learn more? Go to http://fern-lidia.info/. Enter [phone] in the search box to learn more about \"Sciatica: Care Instructions. \"  Current as of: March 21, 2017  Content Version: 11.3  © 0223-9969 [employer]. Care instructions adapted under license by [employer] (which disclaims liability or warranty for this information). If you have questions about a medical condition or this instruction, always ask your healthcare professional. [place] [address] any warranty or liability for your use of this information.

## 2017-06-27 NOTE — ED NOTES
Patient given printed discharge instructions and two script(s). Pt verbalized understanding of instructions and script(s). Patient verbalized importance of following up with pcp. Patient alert and oriented, in no acute distress, ambulatory with self.

## 2017-06-27 NOTE — ED NOTES
Patient has been instructed that they have been given Robaxin* which contains opioids, benzodiazepines, or other sedating drugs. Patient is aware that they  will need to refrain from driving or operating heavy machinery after taking this medication. Patient also instructed that they need to avoid drinking alcohol and using other products containing opioids, benzodiazepines, or other sedating drugs. Patient verbalized understanding.

## 2017-06-27 NOTE — ED NOTES
Emergency Department Nursing Plan of Care       The Nursing Plan of Care is developed from the Nursing assessment and Emergency Department Attending provider initial evaluation. The plan of care may be reviewed in the ED Provider note.     The Plan of Care was developed with the following considerations:   Patient / Family readiness to learn indicated by:verbalized understanding  Persons(s) to be included in education: patient  Barriers to Learning/Limitations:No    [address] [facility]    6/27/2017   10:19 AM

## 2017-06-27 NOTE — ED PROVIDER NOTES
HPI Comments: Santos Sawyer is a 42 y.o. female with hx of herniated disc and cerebral palsy who presents ambulatory to [facility] ED with CC of right lower back pain x ~1 week, becoming notably worse x \"a few days. \" Pt reports her pain occasionally radiates to her right leg. She states she was referred to ED by her PCP today. Pt reports hx of steroid injection for similar symptoms, noting last injection (9/2016). Pt also notes she was recently diagnosed with complex regional pain syndrome. She denies regular heavy lifting while at work. Pt notes she is allergic to PCN. Pt denies ABD pain, saddle anaesthesia, or bowel or bladder incontinence. PCP: [address] [place], [place]  Orthopedics: Nica Laurent MD    There are no other complaints, changes, or physical findings at this time. The history is provided by the patient.         Past Medical History:   Diagnosis Date    Asthma 4/29/2010    Bronchitis     CAD (coronary artery disease)     Sinus Tach    Cerebral palsy ([address] 4/29/2010    [facility]     Endocrine disease     Thyroid goiter    Goiter     Hypertension     Lumbar disc herniation 4/29/2010    Migraine     Neurological disorder     cerebral palsy    Other ill-defined conditions     pleurisy    Other ill-defined conditions     Migraines    Psychiatric disorder     anxiety    Sinus tachycardia 5/23/2014    Thyroid disease        Past Surgical History:   Procedure Laterality Date    HX GYN      Tubal ligation, total hyterectomy    HX HEENT      T & A    HX ORTHOPAEDIC      carpal tunnel release    HX ORTHOPAEDIC      tendon repair R hand    HX ORTHOPAEDIC      pin in toe of R foot    LAP,TUBAL CAUTERY           Family History:   Problem Relation Age of Onset   Cosme Parisian Asthma Mother     Hypertension Mother     Diabetes Mother     Cancer Father      prostate    Asthma Daughter     Asthma Daughter     Stroke Maternal Grandfather     Cancer Paternal Grandmother      throat Social History     Social History    Marital status:      Spouse name: N/A    Number of children: N/A    Years of education: N/A     Occupational History    Not on file. Social History Main Topics    Smoking status: Never Smoker    Smokeless tobacco: Never Used    Alcohol use No    Drug use: No    Sexual activity: Yes     Partners: Male     Birth control/ protection: None     Other Topics Concern    Not on file     Social History Narrative         ALLERGIES: Pcn [penicillins]    Review of Systems   Constitutional: Negative for chills and fever. HENT: Negative for congestion, rhinorrhea, sneezing and sore throat. Eyes: Negative for redness and visual disturbance. Respiratory: Negative for shortness of breath. Cardiovascular: Negative for leg swelling. Gastrointestinal: Negative for abdominal pain, nausea and vomiting.        -incontinence   Genitourinary: Negative for difficulty urinating and frequency. -incontinence   Musculoskeletal: Positive for back pain (right lower). Negative for myalgias and neck stiffness. Skin: Negative for rash. Neurological: Negative for dizziness, syncope, weakness, numbness and headaches.        -saddle anaesthesia   Hematological: Negative for adenopathy. Vitals:    06/27/17 1007   BP: 112/73   Pulse: 74   Resp: 18   Temp: 98.1 °F (36.7 °C)   SpO2: 100%   Weight: 72.6 kg (160 lb)   Height: 5' 1.5\" (1.562 m)            Physical Exam   Constitutional: She is oriented to person, place, and time. She appears well-developed and well-nourished. HENT:   Head: Normocephalic and atraumatic. Mouth/Throat: Oropharynx is clear and moist.   Eyes: Conjunctivae and EOM are normal.   Neck: Normal range of motion and full passive range of motion without pain. Neck supple. Cardiovascular: Normal rate, regular rhythm, S1 normal, S2 normal, normal heart sounds, intact distal pulses and normal pulses. No murmur heard.   Pulmonary/Chest: Effort normal and breath sounds normal. No respiratory distress. She has no wheezes. Abdominal: Soft. Normal appearance and bowel sounds are normal. She exhibits no distension. There is no tenderness. There is no rebound. Musculoskeletal: Normal range of motion. Tenderness to palpation of right paraspinal lumbar muscles; no point tenderness   Neurological: She is alert and oriented to person, place, and time. She has normal strength. Skin: Skin is warm, dry and intact. No rash noted. Psychiatric: She has a normal mood and affect. Her speech is normal and behavior is normal. Judgment and thought content normal.   Nursing note and vitals reviewed. MDM  Number of Diagnoses or Management Options  Lumbar strain, initial encounter:   Sciatica of right side:   Diagnosis management comments: DDx: sciatica, herniated disc, chronic back pain       Amount and/or Complexity of Data Reviewed  Review and summarize past medical records: yes      ED Course       Procedures    MEDICATIONS GIVEN:  Medications   predniSONE (DELTASONE) tablet 60 mg (not administered)   methocarbamol (ROBAXIN) tablet 750 mg (not administered)       IMPRESSION:  1. Sciatica of right side    2. Lumbar strain, initial encounter        PLAN:  1. Discharge for follow up with PCP and Orthopedics    Current Discharge Medication List      START taking these medications    Details   predniSONE (DELTASONE) 20 mg tablet Take 3 Tabs by mouth daily for 5 days. Qty: 15 Tab, Refills: 0      methocarbamol (ROBAXIN-750) 750 mg tablet Take 1 Tab by mouth four (4) times daily for 7 days.   Qty: 28 Tab, Refills: 0         CONTINUE these medications which have NOT CHANGED    Details   Nebulizer & Compressor machine Mask and tubing  Qty: 1 Each, Refills: 0         STOP taking these medications       predniSONE (STERAPRED DS) 10 mg dose pack Comments:   Reason for Stopping:         HYDROcodone-acetaminophen (NORCO) 5-325 mg per tablet Comments:   Reason for Stopping: guaiFENesin (ROBITUSSIN) 100 mg/5 mL liquid Comments:   Reason for Stopping:         naproxen (NAPROSYN) 500 mg tablet Comments:   Reason for Stopping:         Nebulizer Accessories kit Comments:   Reason for Stoppin.   Follow-up Information     Follow up With Details Comments Contact [address] Christine Keenan MD   [address] [facility] [place] [address] [facility] [place]  Dr. Octavia Tyson [address] [phone]        Return to ED if worse     DISCHARGE NOTE:  10:28 AM  The patient is ready for discharge. The patients signs, symptoms, diagnosis, and instructions for discharge have been discussed and the pt has conveyed their understanding. The patient is to follow up as recommended with PCP and Orthopedics or return to the ER should their symptoms worsen. Plan has been discussed and patient has conveyed their agreement. This note is prepared by Jerry Torres, acting as Scribe for No Perez MD.    No Perez MD: The scribe's documentation has been prepared under my direction and personally reviewed by me in its entirety. I confirm that the note above accurately reflects all work, treatment, procedures, and medical decision making performed by me.

## 2017-10-23 ENCOUNTER — HOSPITAL ENCOUNTER (EMERGENCY)
Age: 47
Discharge: HOME | End: 2017-10-23
Attending: EMERGENCY MEDICINE
Payer: SELF-PAY

## 2017-10-23 VITALS
BODY MASS INDEX: 31.68 KG/M2 | HEIGHT: 61 IN | HEART RATE: 86 BPM | WEIGHT: 167.8 LBS | SYSTOLIC BLOOD PRESSURE: 114 MMHG | DIASTOLIC BLOOD PRESSURE: 79 MMHG | OXYGEN SATURATION: 100 % | TEMPERATURE: 97.3 F | RESPIRATION RATE: 16 BRPM

## 2017-10-23 DIAGNOSIS — J06.9 ACUTE UPPER RESPIRATORY INFECTION: ICD-10-CM

## 2017-10-23 DIAGNOSIS — G43.909 MIGRAINE WITHOUT STATUS MIGRAINOSUS, NOT INTRACTABLE, UNSPECIFIED MIGRAINE TYPE: Primary | ICD-10-CM

## 2017-10-23 PROCEDURE — 99283 EMERGENCY DEPT VISIT LOW MDM: CPT

## 2017-10-23 PROCEDURE — 96372 THER/PROPH/DIAG INJ SC/IM: CPT

## 2017-10-23 PROCEDURE — 74011250637 HC RX REV CODE- 250/637: Performed by: PHYSICIAN ASSISTANT

## 2017-10-23 PROCEDURE — 74011250636 HC RX REV CODE- 250/636: Performed by: PHYSICIAN ASSISTANT

## 2017-10-23 RX ORDER — BENZONATATE 200 MG/1
200 CAPSULE ORAL
Qty: 15 CAP | Refills: 0 | Status: SHIPPED | OUTPATIENT
Start: 2017-10-23 | End: 2017-10-28

## 2017-10-23 RX ORDER — KETOROLAC TROMETHAMINE 30 MG/ML
60 INJECTION, SOLUTION INTRAMUSCULAR; INTRAVENOUS
Status: COMPLETED | OUTPATIENT
Start: 2017-10-23 | End: 2017-10-23

## 2017-10-23 RX ORDER — BUTALBITAL, ACETAMINOPHEN AND CAFFEINE 300; 40; 50 MG/1; MG/1; MG/1
1 CAPSULE ORAL
Qty: 12 CAP | Refills: 0 | Status: SHIPPED | OUTPATIENT
Start: 2017-10-23 | End: 2021-12-28

## 2017-10-23 RX ORDER — ONDANSETRON 4 MG/1
4 TABLET, ORALLY DISINTEGRATING ORAL
Status: COMPLETED | OUTPATIENT
Start: 2017-10-23 | End: 2017-10-23

## 2017-10-23 RX ORDER — BISMUTH SUBSALICYLATE 262 MG
1 TABLET,CHEWABLE ORAL DAILY
COMMUNITY

## 2017-10-23 RX ADMIN — KETOROLAC TROMETHAMINE 60 MG: 30 INJECTION, SOLUTION INTRAMUSCULAR at 09:20

## 2017-10-23 RX ADMIN — ONDANSETRON 4 MG: 4 TABLET, ORALLY DISINTEGRATING ORAL at 09:20

## 2017-10-23 NOTE — ED NOTES
....Discharge summary and discharge medications reviewed with patient and appropriate educational materials and side effects teaching were provided. patient  Given 1 paper prescriptions and 1 electronic prescriptions sent to pt's listed pharmacy. Patient (s) verbalized understanding of the importance of discussing medications with his or her physician or clinic they will be following up with. No si/s of acute distress prior to discharge. Patient offered wheelchair from treatment area to hospital entrance, patient refused wheelchair. Pt given work excuse note. Pt reported that her nausea had resolved and headache was slightly better. No other pt complaints. Pt discharged with a steady gait.

## 2017-10-23 NOTE — ED TRIAGE NOTES
patient presents with \"migraine\" headache persisting for about one week. Took Topamax and Imitrex but states no relief. Also, reports occasional, productive cough with yellow tinged sputum x3 days.

## 2017-10-23 NOTE — ED NOTES

## 2017-10-23 NOTE — DISCHARGE INSTRUCTIONS
Migraine Headache: Care Instructions  Your Care Instructions  Migraines are painful, throbbing headaches that often start on one side of the head. They may cause nausea and vomiting and make you sensitive to light, sound, or smell. Without treatment, migraines can last from 4 hours to a few days. Medicines can help prevent migraines or stop them after they have started. Your doctor can help you find which ones work best for you. Follow-up care is a key part of your treatment and safety. Be sure to make and go to all appointments, and call your doctor if you are having problems. It's also a good idea to know your test results and keep a list of the medicines you take. How can you care for yourself at home? · Do not drive if you have taken a prescription pain medicine. · Rest in a quiet, dark room until your headache is gone. Close your eyes, and try to relax or go to sleep. Don't watch TV or read. · Put a cold, moist cloth or cold pack on the painful area for 10 to 20 minutes at a time. Put a thin cloth between the cold pack and your skin. · Use a warm, moist towel or a heating pad set on low to relax tight shoulder and neck muscles. · Have someone gently massage your neck and shoulders. · Take your medicines exactly as prescribed. Call your doctor if you think you are having a problem with your medicine. You will get more details on the specific medicines your doctor prescribes. · Be careful not to take pain medicine more often than the instructions allow. You could get worse or more frequent headaches when the medicine wears off. To prevent migraines  · Keep a headache diary so you can figure out what triggers your headaches. Avoiding triggers may help you prevent headaches. Record when each headache began, how long it lasted, and what the pain was like.  (Was it throbbing, aching, stabbing, or dull?) Write down any other symptoms you had with the headache, such as nausea, flashing lights or dark spots, or sensitivity to bright light or loud noise. Note if the headache occurred near your period. List anything that might have triggered the headache. Triggers may include certain foods (chocolate, cheese, wine) or odors, smoke, bright light, stress, or lack of sleep. · If your doctor has prescribed medicine for your migraines, take it as directed. You may have medicine that you take only when you get a migraine and medicine that you take all the time to help prevent migraines. ¨ If your doctor has prescribed medicine for when you get a headache, take it at the first sign of a migraine, unless your doctor has given you other instructions. ¨ If your doctor has prescribed medicine to prevent migraines, take it exactly as prescribed. Call your doctor if you think you are having a problem with your medicine. · Find healthy ways to deal with stress. Migraines are most common during or right after stressful times. Take time to relax before and after you do something that has caused a migraine in the past.  · Try to keep your muscles relaxed by keeping good posture. Check your jaw, face, neck, and shoulder muscles for tension. Try to relax them. When you sit at a desk, change positions often. And make sure to stretch for 30 seconds each hour. · Get plenty of sleep and exercise. · Eat meals on a regular schedule. Avoid foods and drinks that often trigger migraines. These include chocolate, alcohol (especially red wine and port), aspartame, monosodium glutamate (MSG), and some additives found in foods (such as hot dogs, villareal, cold cuts, aged cheeses, and pickled foods). · Limit caffeine. Don't drink too much coffee, tea, or soda. But don't quit caffeine suddenly. That can also give you migraines. · Do not smoke or allow others to smoke around you. If you need help quitting, talk to your doctor about stop-smoking programs and medicines. These can increase your chances of quitting for good.   · If you are taking birth control pills or hormone therapy, talk to your doctor about whether they are triggering your migraines. When should you call for help? Call 911 anytime you think you may need emergency care. For example, call if:  · You have signs of a stroke. These may include:  ¨ Sudden numbness, paralysis, or weakness in your face, arm, or leg, especially on only one side of your body. ¨ Sudden vision changes. ¨ Sudden trouble speaking. ¨ Sudden confusion or trouble understanding simple statements. ¨ Sudden problems with walking or balance. ¨ A sudden, severe headache that is different from past headaches. Call your doctor now or seek immediate medical care if:  · You have new or worse nausea and vomiting. · You have a new or higher fever. · Your headache gets much worse. Watch closely for changes in your health, and be sure to contact your doctor if:  · You are not getting better after 2 days (48 hours). Where can you learn more? Go to http://ferniMotions - [employer] Trackinglidia.info/. Enter Y009 in the search box to learn more about \"Migraine Headache: Care Instructions. \"  Current as of: October 14, 2016  Content Version: 11.3  © 3123-7723 [employer]. Care instructions adapted under license by [employer] (which disclaims liability or warranty for this information). If you have questions about a medical condition or this instruction, always ask your healthcare professional. [address] any warranty or liability for your use of this information. Upper Respiratory Infection (Cold): Care Instructions  Your Care Instructions    An upper respiratory infection, or URI, is an infection of the nose, sinuses, or throat. URIs are spread by coughs, sneezes, and direct contact. The common cold is the most frequent kind of URI. The flu and sinus infections are other kinds of URIs. Almost all URIs are caused by viruses. Antibiotics won't cure them.  But you can treat most infections with home care. This may include drinking lots of fluids and taking over-the-counter pain medicine. You will probably feel better in 4 to 10 days. The doctor has checked you carefully, but problems can develop later. If you notice any problems or new symptoms, get medical treatment right away. Follow-up care is a key part of your treatment and safety. Be sure to make and go to all appointments, and call your doctor if you are having problems. It's also a good idea to know your test results and keep a list of the medicines you take. How can you care for yourself at home? · To prevent dehydration, drink plenty of fluids, enough so that your urine is light yellow or clear like water. Choose water and other caffeine-free clear liquids until you feel better. If you have kidney, heart, or liver disease and have to limit fluids, talk with your doctor before you increase the amount of fluids you drink. · Take an over-the-counter pain medicine, such as acetaminophen (Tylenol), ibuprofen (Advil, Motrin), or naproxen (Aleve). Read and follow all instructions on the label. · Before you use cough and cold medicines, check the label. These medicines may not be safe for young children or for people with certain health problems. · Be careful when taking over-the-counter cold or flu medicines and Tylenol at the same time. Many of these medicines have acetaminophen, which is Tylenol. Read the labels to make sure that you are not taking more than the recommended dose. Too much acetaminophen (Tylenol) can be harmful. · Get plenty of rest.  · Do not smoke or allow others to smoke around you. If you need help quitting, talk to your doctor about stop-smoking programs and medicines. These can increase your chances of quitting for good. When should you call for help? Call 911 anytime you think you may need emergency care. For example, call if:  · You have severe trouble breathing.   Call your doctor now or seek immediate medical care if:  · You seem to be getting much sicker. · You have new or worse trouble breathing. · You have a new or higher fever. · You have a new rash. Watch closely for changes in your health, and be sure to contact your doctor if:  · You have a new symptom, such as a sore throat, an earache, or sinus pain. · You cough more deeply or more often, especially if you notice more mucus or a change in the color of your mucus. · You do not get better as expected. Where can you learn more? Go to http://fern-lidia.info/. Enter R896 in the search box to learn more about \"Upper Respiratory Infection (Cold): Care Instructions. \"  Current as of: March 25, 2017  Content Version: 11.3  © 3563-3570 [employer]. Care instructions adapted under license by [employer] (which disclaims liability or warranty for this information). If you have questions about a medical condition or this instruction, always ask your healthcare professional. [address] any warranty or liability for your use of this information.

## 2017-10-23 NOTE — LETTER
[facility] EMERGENCY DEPT 
[address] [address] 
[phone] Work/School Note Date: 10/23/2017 To Whom It May concern: 
 
Geri Conteh was seen and treated today in the emergency room by the following provider(s): 
Attending Provider: Adriana Bazan MD 
Physician Assistant: Willy Decker PA-C. Geri Conteh may return to work on 10/24/17. Sincerely, Willy Decker PA-C

## 2017-10-23 NOTE — LETTER
[facility] EMERGENCY DEPT 
[address] [place] [address] 
[phone] Work/School Note Date: 10/23/2017 To Whom It May concern: 
 
Darling Villatoro was seen and treated today in the emergency room by the following provider(s): 
Attending Provider: David Potts MD 
Physician Assistant: Renea Silva PA-C. Darling Villatoro may return to work on Wednesday, 10/25/17 or sooner if feeling better.  
 
Sincerely, 
 
 
 
 
Navi Lindsey RN

## 2017-10-23 NOTE — ED PROVIDER NOTES
Patient is a 42 y.o. female presenting with headaches and cough. The history is provided by the patient. Headache    This is a new problem. Episode onset: 1 wk pt reports persistent migraine similiar to her usual but not getting any relief with her topamax or imitrex. The problem occurs constantly. The problem has not changed since onset. The headache is aggravated by an unknown factor. The pain is located in the left unilateral region. The quality of the pain is described as dull. The pain is at a severity of 8/10. Associated symptoms include visual change and nausea. Pertinent negatives include no fever, no shortness of breath, no weakness, no dizziness and no vomiting. Cough   This is a new problem. Episode onset: 3 days. The problem occurs constantly. The problem has not changed since onset. The cough is productive of sputum. There has been no fever. Associated symptoms include headaches, wheezing and nausea. Pertinent negatives include no rhinorrhea, no sore throat, no shortness of breath and no vomiting. She has tried nothing for the symptoms. She is not a smoker. Her past medical history is significant for bronchitis and asthma. Past medical history comments: CAD, migraines, HTN, anxiety, cerebral palsy, thyroid goiter.         Past Medical History:   Diagnosis Date    Asthma 4/29/2010    Bronchitis     CAD (coronary artery disease)     Sinus Tach    Cerebral palsy ([address] 4/29/2010    [facility]     Endocrine disease     Thyroid goiter    Goiter     Hypertension     Lumbar disc herniation 4/29/2010    Migraine     Neurological disorder     cerebral palsy    Other ill-defined conditions(799.89)     pleurisy    Other ill-defined conditions(799.89)     Migraines    Psychiatric disorder     anxiety    Sinus tachycardia 5/23/2014    Thyroid disease        Past Surgical History:   Procedure Laterality Date    HX GYN      Tubal ligation, total hyterectomy    HX HEENT      T & A    HX HYSTERECTOMY      HX ORTHOPAEDIC      carpal tunnel release    HX ORTHOPAEDIC      tendon repair R hand    HX ORTHOPAEDIC      pin in toe of R foot    LAP,TUBAL CAUTERY           Family History:   Problem Relation Age of Onset   Ani Haus Asthma Mother     Hypertension Mother     Diabetes Mother     Cancer Father      prostate    Asthma Daughter     Asthma Daughter     Stroke Maternal Grandfather     Cancer Paternal Grandmother      throat       Social History     Social History    Marital status:      Spouse name: N/A    Number of children: N/A    Years of education: N/A     Occupational History    Not on file. Social History Main Topics    Smoking status: Never Smoker    Smokeless tobacco: Never Used    Alcohol use Yes      Comment: social    Drug use: No    Sexual activity: Yes     Partners: Male     Birth control/ protection: None, Surgical     Other Topics Concern    Not on file     Social History Narrative         ALLERGIES: Pcn [penicillins]    Review of Systems   Constitutional: Negative for fever. HENT: Negative for rhinorrhea and sore throat. Respiratory: Positive for cough and wheezing. Negative for shortness of breath. Gastrointestinal: Positive for nausea. Negative for vomiting. Neurological: Positive for headaches. Negative for dizziness, speech difficulty and weakness. All other systems reviewed and are negative. Vitals:    10/23/17 0901   BP: 114/79   Pulse: 76   Resp: 17   Temp: 97.3 °F (36.3 °C)   SpO2: 99%   Weight: 76.1 kg (167 lb 12.8 oz)   Height: 5' 1.2\" (1.554 m)            Physical Exam   Constitutional: She is oriented to person, place, and time. She appears well-developed and well-nourished. No distress. HENT:   Head: Normocephalic and atraumatic. Mouth/Throat: Oropharynx is clear and moist. No oropharyngeal exudate. Eyes: Conjunctivae are normal.   Cardiovascular: Normal rate, regular rhythm and normal heart sounds.     Pulmonary/Chest: Effort normal and breath sounds normal. No respiratory distress. She has no wheezes. She has no rales. Musculoskeletal: Normal range of motion. Neurological: She is alert and oriented to person, place, and time. Skin: Skin is warm and dry. Psychiatric: She has a normal mood and affect. Her behavior is normal. Judgment and thought content normal.   Nursing note and vitals reviewed. MDM  Number of Diagnoses or Management Options  Diagnosis management comments: DDX: URI, migraine, tension HA, bronchitis    ED Course       Procedures    MEDICATIONS GIVEN:  Medications   ondansetron (ZOFRAN ODT) tablet 4 mg (4 mg Oral Given 10/23/17 0920)   ketorolac (TORADOL) injection 60 mg (60 mg IntraMUSCular Given 10/23/17 0920)         RADIOLOGY RESULTS:  The following have been ordered and reviewed:  No orders to display       PROGRESS NOTE:  A/P  10:01 AM  Pt reevaluated, HA improving. The patient's signs, symptoms, diagnosis, and discharge instruction have been discussed and the patient has conveyed their understanding. The patient is to follow up with PCP or return to the ER should their symptoms worsen. Plan has been discussed and the patient is in agreement. Pt is ready for discharge      DIAGNOSIS:  1. Migraine without status migrainosus, not intractable, unspecified migraine type    2. Acute upper respiratory infection        PLAN:  Follow-up Information     Follow up With Details Comments Errol Boo [address] Charissa Perez MD Schedule an appointment as soon as possible for a visit in 2 days As needed [facility] access hospital  [phone]          Current Discharge Medication List      START taking these medications    Details   butalbital-acetaminophen-caff (FIORICET) -40 mg per capsule Take 1 Cap by mouth every four (4) hours as needed for Headache. Max Daily Amount: 6 Caps.   Qty: 12 Cap, Refills: 0      benzonatate (TESSALON) 200 mg capsule Take 1 Cap by mouth three (3) times daily as needed for Cough for up to 5 days. Qty: 15 Cap, Refills: 0         CONTINUE these medications which have NOT CHANGED    Details   multivitamin (ONE A DAY) tablet Take 1 Tab by mouth daily. fluticasone (FLONASE) 50 mcg/actuation nasal spray 2 Sprays by Both Nostrils route daily. Qty: 1 Bottle, Refills: 11      gabapentin (NEURONTIN) 600 mg tablet Take 1 Tab by mouth three (3) times daily. Qty: 90 Tab, Refills: 3      baclofen (LIORESAL) 10 mg tablet Take 1 Tab by mouth three (3) times daily. Qty: 90 Tab, Refills: 3    Associated Diagnoses: Medication refill      SUMAtriptan (IMITREX) 100 mg tablet Take 1 Tab by mouth once as needed for Migraine. Qty: 10 Tab, Refills: 4      topiramate (TOPAMAX) 50 mg tablet Take 1 Tab by mouth daily. Qty: 30 Tab, Refills: 3    Associated Diagnoses: Medication refill      albuterol sulfate (PROAIR RESPICLICK) 90 mcg/actuation aepb Take 2 Puffs by inhalation every four (4) hours. Qty: 1 Inhaler, Refills: 0      albuterol (PROVENTIL HFA, VENTOLIN HFA, PROAIR HFA) 90 mcg/actuation inhaler Take 1-2 Puffs by inhalation every four (4) hours as needed for Wheezing. Qty: 1 Inhaler, Refills: 1      montelukast (SINGULAIR) 10 mg tablet Take 1 Tab by mouth daily. Qty: 30 Tab, Refills: 0      FLUoxetine (PROZAC) 20 mg tablet Take 1 Tab by mouth daily. Qty: 30 Tab, Refills: 2      albuterol (PROVENTIL VENTOLIN) 2.5 mg /3 mL (0.083 %) nebulizer solution 3 mL by Nebulization route every four (4) hours as needed for Wheezing. Qty: 50 Each, Refills: 0    Associated Diagnoses: Mild intermittent asthma without complication      metoprolol tartrate (LOPRESSOR) 25 mg tablet Take 1 Tab by mouth two (2) times a day. Qty: 60 Tab, Refills: 1    Associated Diagnoses: Medication refill      Nebulizer & Compressor machine Mask and tubing  Qty: 1 Each, Refills: 0      ranitidine (ZANTAC) 150 mg tablet Take 1 Tab by mouth two (2) times a day.   Qty: 60 Tab, Refills: 1      Ferrous Sulfate (FLOW FE) 47.5 mg iron TbER tablet Take 1 Tab by mouth daily. Take with vitamin C  Qty: 30 Tab, Refills: 1      norethindrone (MICRONOR) 0.35 mg tablet Take 1 tablet by mouth daily. Qty: 1 Package, Refills: 12    Associated Diagnoses: Medication refill      ALPRAZolam (XANAX) 0.5 mg tablet Take 1 tablet by mouth daily as needed for Anxiety.   Qty: 30 tablet, Refills: 0    Associated Diagnoses: Anxiety

## 2018-02-18 ENCOUNTER — HOSPITAL ENCOUNTER (EMERGENCY)
Age: 48
Discharge: HOME | End: 2018-02-18
Attending: EMERGENCY MEDICINE
Payer: COMMERCIAL

## 2018-02-18 ENCOUNTER — APPOINTMENT (OUTPATIENT)
Dept: GENERAL RADIOLOGY | Age: 48
End: 2018-02-18
Attending: EMERGENCY MEDICINE
Payer: COMMERCIAL

## 2018-02-18 VITALS
WEIGHT: 161 LBS | RESPIRATION RATE: 16 BRPM | HEART RATE: 91 BPM | TEMPERATURE: 99 F | OXYGEN SATURATION: 100 % | HEIGHT: 62 IN | SYSTOLIC BLOOD PRESSURE: 121 MMHG | DIASTOLIC BLOOD PRESSURE: 84 MMHG | BODY MASS INDEX: 29.63 KG/M2

## 2018-02-18 DIAGNOSIS — J06.9 ACUTE UPPER RESPIRATORY INFECTION: Primary | ICD-10-CM

## 2018-02-18 PROCEDURE — 99283 EMERGENCY DEPT VISIT LOW MDM: CPT

## 2018-02-18 PROCEDURE — 71046 X-RAY EXAM CHEST 2 VIEWS: CPT

## 2018-02-18 RX ORDER — IBUPROFEN 600 MG/1
600 TABLET ORAL
Qty: 20 TAB | Refills: 0 | Status: SHIPPED | OUTPATIENT
Start: 2018-02-18 | End: 2022-10-09

## 2018-02-18 RX ORDER — ACETAMINOPHEN 325 MG/1
650 TABLET ORAL
Qty: 20 TAB | Refills: 0 | Status: SHIPPED | OUTPATIENT
Start: 2018-02-18 | End: 2022-10-09 | Stop reason: SDUPTHER

## 2018-02-18 NOTE — ED NOTES
Discharge instructions were given to the patient by VICKIE Noriega RN. .     The patient left the Emergency Department ambulatory, alert and oriented and in no acute distress with 2 prescription(s). The patient was encouraged to call or return to the ED for worsening symptoms or problems and was encouraged to schedule a follow up appointment for continuing care. Patient leaving ED accompanied by self. The patient verbalized understanding of discharge instructions and prescriptions, all questions were answered. The patient has no further concerns at this time. Patient declined wheelchair transfer upon ED discharge.

## 2018-02-18 NOTE — DISCHARGE INSTRUCTIONS

## 2018-02-18 NOTE — ED PROVIDER NOTES
EMERGENCY DEPARTMENT HISTORY AND PHYSICAL EXAM    Date: 2/18/2018  Patient Name: Asif Mejia    History of Presenting Illness     Chief Complaint   Patient presents with    Cold Symptoms     with cough, congestion, fever, fatigue x 3 days. Family members at home with the flu. History Provided By: Patient    Chief Complaint: body aches, cough, fatigue  Duration: 3-4 days   Timing:  Acute  Location: body aches, all over  Quality: Aching  Severity: Moderate  Modifying Factors: family with influenza  Associated Symptoms: slight sore throat      HPI: Asif Mejia is a 41 y.o. female with a PMH of asthma, CP, anxiety who presents with several day history of slight runny nose, slight cough, body aches and fatigue. Slightly diminished appetite, no vomiting or diarrhea. No cp. No sob. No abd pain. PCP: None    Current Outpatient Prescriptions   Medication Sig Dispense Refill    multivitamin (ONE A DAY) tablet Take 1 Tab by mouth daily.  butalbital-acetaminophen-caff (FIORICET) -40 mg per capsule Take 1 Cap by mouth every four (4) hours as needed for Headache. Max Daily Amount: 6 Caps. 12 Cap 0    fluticasone (FLONASE) 50 mcg/actuation nasal spray 2 Sprays by Both Nostrils route daily. 1 Bottle 11    albuterol (PROVENTIL VENTOLIN) 2.5 mg /3 mL (0.083 %) nebulizer solution 3 mL by Nebulization route every four (4) hours as needed for Wheezing. 50 Each 0    gabapentin (NEURONTIN) 600 mg tablet Take 1 Tab by mouth three (3) times daily. 90 Tab 3    baclofen (LIORESAL) 10 mg tablet Take 1 Tab by mouth three (3) times daily. 90 Tab 3    SUMAtriptan (IMITREX) 100 mg tablet Take 1 Tab by mouth once as needed for Migraine. 10 Tab 4    topiramate (TOPAMAX) 50 mg tablet Take 1 Tab by mouth daily. 30 Tab 3    albuterol sulfate (PROAIR RESPICLICK) 90 mcg/actuation aepb Take 2 Puffs by inhalation every four (4) hours.  1 Inhaler 0    Nebulizer & Compressor machine Mask and tubing 1 Each 0    albuterol (PROVENTIL HFA, VENTOLIN HFA, PROAIR HFA) 90 mcg/actuation inhaler Take 1-2 Puffs by inhalation every four (4) hours as needed for Wheezing. 1 Inhaler 1    ranitidine (ZANTAC) 150 mg tablet Take 1 Tab by mouth two (2) times a day. 60 Tab 1    montelukast (SINGULAIR) 10 mg tablet Take 1 Tab by mouth daily. 30 Tab 0    FLUoxetine (PROZAC) 20 mg tablet Take 1 Tab by mouth daily. 30 Tab 2    metoprolol tartrate (LOPRESSOR) 25 mg tablet Take 1 Tab by mouth two (2) times a day. 60 Tab 1    Ferrous Sulfate (FLOW FE) 47.5 mg iron TbER tablet Take 1 Tab by mouth daily. Take with vitamin C 30 Tab 1    ALPRAZolam (XANAX) 0.5 mg tablet Take 1 tablet by mouth daily as needed for Anxiety.  30 tablet 0       Past History     Past Medical History:  Past Medical History:   Diagnosis Date    Asthma 4/29/2010    Bronchitis     CAD (coronary artery disease)     Sinus Tach    Cerebral palsy (HCC) 4/29/2010    Cerebral palsy (HCC)     CP (cerebral palsy) (HCC)     Endocrine disease     Thyroid goiter    Goiter     Hypertension     Lumbar disc herniation 4/29/2010    Migraine     Neurological disorder     cerebral palsy    Other ill-defined conditions(799.89)     pleurisy    Other ill-defined conditions(799.89)     Migraines    Psychiatric disorder     anxiety    Sinus tachycardia 5/23/2014    Thyroid disease        Past Surgical History:  Past Surgical History:   Procedure Laterality Date    HX GYN      Tubal ligation, total hyterectomy    HX HEENT      T & A    HX HYSTERECTOMY      HX ORTHOPAEDIC      carpal tunnel release    HX ORTHOPAEDIC      tendon repair R hand    HX ORTHOPAEDIC      pin in toe of R foot    LAP,TUBAL CAUTERY         Family History:  Family History   Problem Relation Age of Onset   [facility] Asthma Mother     Hypertension Mother     Diabetes Mother     Cancer Father      prostate    Asthma Daughter     Asthma Daughter     Stroke Maternal Grandfather     Cancer Paternal Grandmother throat       Social History:  Social History   Substance Use Topics    Smoking status: Never Smoker    Smokeless tobacco: Never Used    Alcohol use Yes      Comment: social       Allergies: Allergies   Allergen Reactions    Pcn [Penicillins] Hives     Swelling in hands          Review of Systems   Review of Systems   Constitutional: Positive for activity change, appetite change, chills, fatigue and fever. HENT: Positive for rhinorrhea and sore throat. Negative for congestion. Eyes: Negative for pain and discharge. Respiratory: Positive for cough. Negative for shortness of breath and wheezing. Cardiovascular: Negative for chest pain and palpitations. Gastrointestinal: Negative for abdominal pain, nausea and vomiting. Genitourinary: Negative for dysuria, frequency and urgency. Musculoskeletal: Negative for back pain and neck pain. Skin: Negative for rash and wound. Neurological: Positive for headaches. Negative for seizures and syncope. Psychiatric/Behavioral: Negative for confusion. The patient is not nervous/anxious. All other systems reviewed and are negative. Physical Exam     Vitals:    02/18/18 1012   BP: 121/84   Pulse: 91   Resp: 16   Temp: 99 °F (37.2 °C)   SpO2: 100%   Weight: 73 kg (161 lb)   Height: 5' 1.5\" (1.562 m)     Physical Exam   Constitutional: She is oriented to person, place, and time. She appears well-developed and well-nourished. HENT:   Head: Normocephalic and atraumatic. Right Ear: External ear normal.   Left Ear: External ear normal.   Nose: Nose normal.   Mouth/Throat: Oropharynx is clear and moist.   Eyes: Conjunctivae and EOM are normal. Pupils are equal, round, and reactive to light. Neck: Normal range of motion. Neck supple. Cardiovascular: Normal rate, regular rhythm and normal heart sounds. Exam reveals no gallop and no friction rub. No murmur heard. Pulmonary/Chest: Effort normal and breath sounds normal. She has no wheezes.  She has no rales.   Abdominal: Soft. Bowel sounds are normal. There is no tenderness. There is no rebound and no guarding. Musculoskeletal: Normal range of motion. She exhibits no edema. Neurological: She is alert and oriented to person, place, and time. She has normal reflexes. Skin: Skin is warm and dry. Psychiatric: She has a normal mood and affect. Her behavior is normal.   Nursing note and vitals reviewed. Diagnostic Study Results     Labs -   No results found for this or any previous visit (from the past 12 hour(s)). Radiologic Studies -   XR CHEST PA LAT    (Results Pending)     CT Results  (Last 48 hours)    None        CXR Results  (Last 48 hours)    None            Medical Decision Making   I am the first provider for this patient. I reviewed the vital signs, available nursing notes, past medical history, past surgical history, family history and social history. Vital Signs-Reviewed the patient's vital signs. Records Reviewed: Nursing Notes    ED Course:   Stable in ED    Disposition:  home    DISCHARGE NOTE:   11:32 AM        Care plan outlined and precautions discussed. Patient has no new complaints, changes, or physical findings. Results of exam were reviewed with the patient. All medications were reviewed with the patient; will d/c home with ibuprofen/ tylenol. All of pt's questions and concerns were addressed. Patient was instructed and agrees to follow up with PCP as referred, as well as to return to the ED upon further deterioration. Patient is ready to go home. Follow-up Information     Follow up With Details Comments MD Nereida  As needed [id number] [address] - [place] [facility] DE[facility]  If symptoms worsen [address] [facility]          Current Discharge Medication List          Provider Notes (Medical Decision Making):    Influenza, uri, bronchitis, pna,   Well appearing woman with normal lung exam and sats 100%      Procedures:  Procedures        Diagnosis     Clinical Impression:   1.  Acute upper respiratory infection

## 2018-02-18 NOTE — LETTER
[facility] EMERGENCY DEPT 
[address] [facility] [id number] [phone] Work/School Note Date: 2/18/2018 To Whom It May concern: 
 
Jayleen Hernandez was seen and treated today in the emergency room by the following provider(s): 
Attending Provider: Arron Cunha MD 
Physician Assistant: ESVIN Mora. Ms. Chilango Chamberlain was seen in the Emergency Room 2/18 for illness ongoing few days. Jayleen Hernandez may return to work on 2/20/2018. Sincerely, ESVIN Mora

## 2018-07-06 ENCOUNTER — APPOINTMENT (OUTPATIENT)
Dept: GENERAL RADIOLOGY | Age: 48
End: 2018-07-06
Attending: EMERGENCY MEDICINE
Payer: SELF-PAY

## 2018-07-06 ENCOUNTER — HOSPITAL ENCOUNTER (EMERGENCY)
Age: 48
Discharge: HOME | End: 2018-07-06
Attending: EMERGENCY MEDICINE
Payer: SELF-PAY

## 2018-07-06 VITALS
BODY MASS INDEX: 30.36 KG/M2 | WEIGHT: 165 LBS | HEART RATE: 71 BPM | DIASTOLIC BLOOD PRESSURE: 75 MMHG | RESPIRATION RATE: 18 BRPM | TEMPERATURE: 98.4 F | HEIGHT: 62 IN | SYSTOLIC BLOOD PRESSURE: 139 MMHG | OXYGEN SATURATION: 100 %

## 2018-07-06 DIAGNOSIS — R07.89 CHEST WALL PAIN: ICD-10-CM

## 2018-07-06 DIAGNOSIS — R10.84 ABDOMINAL PAIN, GENERALIZED: Primary | ICD-10-CM

## 2018-07-06 LAB
ALBUMIN SERPL-MCNC: 3.2 G/DL (ref 3.5–5)
ALBUMIN/GLOB SERPL: 0.8 {RATIO} (ref 1.1–2.2)
ALP SERPL-CCNC: 64 U/L (ref 45–117)
ALT SERPL-CCNC: 30 U/L (ref 12–78)
ANION GAP SERPL CALC-SCNC: 10 MMOL/L (ref 5–15)
APPEARANCE UR: CLEAR
AST SERPL-CCNC: 18 U/L (ref 15–37)
ATRIAL RATE: 72 BPM
BASOPHILS # BLD: 0 K/UL (ref 0–0.1)
BASOPHILS NFR BLD: 1 % (ref 0–1)
BILIRUB SERPL-MCNC: 0.2 MG/DL (ref 0.2–1)
BILIRUB UR QL: NEGATIVE
BNP SERPL-MCNC: 57 PG/ML (ref 0–125)
BUN SERPL-MCNC: 11 MG/DL (ref 6–20)
BUN/CREAT SERPL: 14 (ref 12–20)
CALCIUM SERPL-MCNC: 8.5 MG/DL (ref 8.5–10.1)
CALCULATED P AXIS, ECG09: 63 DEGREES
CALCULATED R AXIS, ECG10: 51 DEGREES
CALCULATED T AXIS, ECG11: 45 DEGREES
CHLORIDE SERPL-SCNC: 105 MMOL/L (ref 97–108)
CO2 SERPL-SCNC: 26 MMOL/L (ref 21–32)
COLOR UR: NORMAL
CREAT SERPL-MCNC: 0.76 MG/DL (ref 0.55–1.02)
D DIMER PPP FEU-MCNC: 0.51 MG/L FEU (ref 0–0.65)
DIAGNOSIS, 93000: NORMAL
DIFFERENTIAL METHOD BLD: NORMAL
EOSINOPHIL # BLD: 0.1 K/UL (ref 0–0.4)
EOSINOPHIL NFR BLD: 2 % (ref 0–7)
ERYTHROCYTE [DISTWIDTH] IN BLOOD BY AUTOMATED COUNT: 14.3 % (ref 11.5–14.5)
GLOBULIN SER CALC-MCNC: 3.9 G/DL (ref 2–4)
GLUCOSE SERPL-MCNC: 90 MG/DL (ref 65–100)
GLUCOSE UR STRIP.AUTO-MCNC: NEGATIVE MG/DL
HCG UR QL: NEGATIVE
HCT VFR BLD AUTO: 36.7 % (ref 35–47)
HGB BLD-MCNC: 11.8 G/DL (ref 11.5–16)
HGB UR QL STRIP: NEGATIVE
IMM GRANULOCYTES # BLD: 0 K/UL (ref 0–0.04)
IMM GRANULOCYTES NFR BLD AUTO: 0 % (ref 0–0.5)
INR PPP: 0.9 (ref 0.9–1.1)
KETONES UR QL STRIP.AUTO: NEGATIVE MG/DL
LEUKOCYTE ESTERASE UR QL STRIP.AUTO: NEGATIVE
LIPASE SERPL-CCNC: 86 U/L (ref 73–393)
LYMPHOCYTES # BLD: 1.6 K/UL (ref 0.8–3.5)
LYMPHOCYTES NFR BLD: 27 % (ref 12–49)
MCH RBC QN AUTO: 26 PG (ref 26–34)
MCHC RBC AUTO-ENTMCNC: 32.2 G/DL (ref 30–36.5)
MCV RBC AUTO: 81 FL (ref 80–99)
MONOCYTES # BLD: 0.5 K/UL (ref 0–1)
MONOCYTES NFR BLD: 8 % (ref 5–13)
NEUTS SEG # BLD: 3.9 K/UL (ref 1.8–8)
NEUTS SEG NFR BLD: 63 % (ref 32–75)
NITRITE UR QL STRIP.AUTO: NEGATIVE
NRBC # BLD: 0 K/UL (ref 0–0.01)
NRBC BLD-RTO: 0 PER 100 WBC
P-R INTERVAL, ECG05: 170 MS
PH UR STRIP: 8 [PH] (ref 5–8)
PLATELET # BLD AUTO: 185 K/UL (ref 150–400)
PMV BLD AUTO: 12.5 FL (ref 8.9–12.9)
POTASSIUM SERPL-SCNC: 4.2 MMOL/L (ref 3.5–5.1)
PROT SERPL-MCNC: 7.1 G/DL (ref 6.4–8.2)
PROT UR STRIP-MCNC: NEGATIVE MG/DL
PROTHROMBIN TIME: 9.2 SEC (ref 9–11.1)
Q-T INTERVAL, ECG07: 350 MS
QRS DURATION, ECG06: 68 MS
QTC CALCULATION (BEZET), ECG08: 383 MS
RBC # BLD AUTO: 4.53 M/UL (ref 3.8–5.2)
SODIUM SERPL-SCNC: 141 MMOL/L (ref 136–145)
SP GR UR REFRACTOMETRY: 1.01 (ref 1–1.03)
TROPONIN I BLD-MCNC: <0.04 NG/ML (ref 0–0.08)
UROBILINOGEN UR QL STRIP.AUTO: 0.2 EU/DL (ref 0.2–1)
VENTRICULAR RATE, ECG03: 72 BPM
WBC # BLD AUTO: 6.1 K/UL (ref 3.6–11)

## 2018-07-06 PROCEDURE — 74011250637 HC RX REV CODE- 250/637: Performed by: EMERGENCY MEDICINE

## 2018-07-06 PROCEDURE — 74011250636 HC RX REV CODE- 250/636: Performed by: EMERGENCY MEDICINE

## 2018-07-06 PROCEDURE — 74022 RADEX COMPL AQT ABD SERIES: CPT

## 2018-07-06 PROCEDURE — 96361 HYDRATE IV INFUSION ADD-ON: CPT

## 2018-07-06 PROCEDURE — 80053 COMPREHEN METABOLIC PANEL: CPT | Performed by: EMERGENCY MEDICINE

## 2018-07-06 PROCEDURE — 83690 ASSAY OF LIPASE: CPT | Performed by: EMERGENCY MEDICINE

## 2018-07-06 PROCEDURE — 96374 THER/PROPH/DIAG INJ IV PUSH: CPT

## 2018-07-06 PROCEDURE — 85379 FIBRIN DEGRADATION QUANT: CPT | Performed by: EMERGENCY MEDICINE

## 2018-07-06 PROCEDURE — 83880 ASSAY OF NATRIURETIC PEPTIDE: CPT | Performed by: EMERGENCY MEDICINE

## 2018-07-06 PROCEDURE — 81003 URINALYSIS AUTO W/O SCOPE: CPT | Performed by: EMERGENCY MEDICINE

## 2018-07-06 PROCEDURE — 85025 COMPLETE CBC W/AUTO DIFF WBC: CPT | Performed by: EMERGENCY MEDICINE

## 2018-07-06 PROCEDURE — 93005 ELECTROCARDIOGRAM TRACING: CPT

## 2018-07-06 PROCEDURE — 99285 EMERGENCY DEPT VISIT HI MDM: CPT

## 2018-07-06 PROCEDURE — 81025 URINE PREGNANCY TEST: CPT

## 2018-07-06 PROCEDURE — 85610 PROTHROMBIN TIME: CPT | Performed by: EMERGENCY MEDICINE

## 2018-07-06 PROCEDURE — 84484 ASSAY OF TROPONIN QUANT: CPT

## 2018-07-06 PROCEDURE — 36415 COLL VENOUS BLD VENIPUNCTURE: CPT | Performed by: EMERGENCY MEDICINE

## 2018-07-06 RX ORDER — GUAIFENESIN 100 MG/5ML
325 LIQUID (ML) ORAL
Status: COMPLETED | OUTPATIENT
Start: 2018-07-06 | End: 2018-07-06

## 2018-07-06 RX ORDER — ONDANSETRON 2 MG/ML
4 INJECTION INTRAMUSCULAR; INTRAVENOUS
Status: COMPLETED | OUTPATIENT
Start: 2018-07-06 | End: 2018-07-06

## 2018-07-06 RX ORDER — KETOROLAC TROMETHAMINE 30 MG/ML
60 INJECTION, SOLUTION INTRAMUSCULAR; INTRAVENOUS
Status: DISCONTINUED | OUTPATIENT
Start: 2018-07-06 | End: 2018-07-06

## 2018-07-06 RX ORDER — ONDANSETRON 4 MG/1
4 TABLET, ORALLY DISINTEGRATING ORAL
Qty: 10 TAB | Refills: 0 | Status: SHIPPED | OUTPATIENT
Start: 2018-07-06

## 2018-07-06 RX ORDER — TRAMADOL HYDROCHLORIDE 50 MG/1
50 TABLET ORAL
Qty: 10 TAB | Refills: 0 | Status: SHIPPED | OUTPATIENT
Start: 2018-07-06

## 2018-07-06 RX ADMIN — ASPIRIN 81 MG 324 MG: 81 TABLET ORAL at 07:33

## 2018-07-06 RX ADMIN — ONDANSETRON HYDROCHLORIDE 4 MG: 2 INJECTION, SOLUTION INTRAMUSCULAR; INTRAVENOUS at 07:34

## 2018-07-06 RX ADMIN — SODIUM CHLORIDE 1000 ML: 900 INJECTION, SOLUTION INTRAVENOUS at 07:34

## 2018-07-06 NOTE — ED NOTES
Pt arrived in ED by way of EMS w/ complaint of mid upper abd pain that radiates to mid chest w/ nausea, diaphoresis, dizziness, and dry mouth X 1 day. EMS administered 4 mg Zofran IV en route to ED. Pt is A&O X 4 and appears in no distress. Emergency Department Nursing Plan of Care       The Nursing Plan of Care is developed from the Nursing assessment and Emergency Department Attending provider initial evaluation. The plan of care may be reviewed in the ED Provider note.     The Plan of Care was developed with the following considerations:   Patient / Family readiness to learn indicated by:verbalized understanding  Persons(s) to be included in education: patient  Barriers to Learning/Limitations:No    Signed     Aranza Vega RN    7/6/2018   6:52 AM

## 2018-07-06 NOTE — ED NOTES
Bedside and Verbal shift change report given to AERIAL RN  (oncoming nurse) by PONCHO RN  (offgoing nurse). Report included the following information SBAR, Kardex, ED Summary, Intake/Output, MAR and Recent Results.

## 2018-07-06 NOTE — LETTER
[facility] EMERGENCY DEPT 
[address] [place] [id number] 
[phone] Work/School Note Date: 7/6/2018 To Whom It May concern: 
 
Bre Darby was seen and treated today in the emergency room by the following provider(s): 
Attending Provider: Ara Tolliver MD. Bre Darby may return to work on 7/8/2018. Sincerely, Ann Marie SWANSON

## 2018-07-06 NOTE — DISCHARGE INSTRUCTIONS
Abdominal Pain: Care Instructions  Your Care Instructions    Abdominal pain has many possible causes. Some aren't serious and get better on their own in a few days. Others need more testing and treatment. If your pain continues or gets worse, you need to be rechecked and may need more tests to find out what is wrong. You may need surgery to correct the problem. Don't ignore new symptoms, such as fever, nausea and vomiting, urination problems, pain that gets worse, and dizziness. These may be signs of a more serious problem. Your doctor may have recommended a follow-up visit in the next 8 to 12 hours. If you are not getting better, you may need more tests or treatment. The doctor has checked you carefully, but problems can develop later. If you notice any problems or new symptoms, get medical treatment right away. Follow-up care is a key part of your treatment and safety. Be sure to make and go to all appointments, and call your doctor if you are having problems. It's also a good idea to know your test results and keep a list of the medicines you take. How can you care for yourself at home? · Rest until you feel better. · To prevent dehydration, drink plenty of fluids, enough so that your urine is light yellow or clear like water. Choose water and other caffeine-free clear liquids until you feel better. If you have kidney, heart, or liver disease and have to limit fluids, talk with your doctor before you increase the amount of fluids you drink. · If your stomach is upset, eat mild foods, such as rice, dry toast or crackers, bananas, and applesauce. Try eating several small meals instead of two or three large ones. · Wait until 48 hours after all symptoms have gone away before you have spicy foods, alcohol, and drinks that contain caffeine. · Do not eat foods that are high in fat. · Avoid anti-inflammatory medicines such as aspirin, ibuprofen (Advil, Motrin), and naproxen (Aleve).  These can cause stomach upset. Talk to your doctor if you take daily aspirin for another health problem. When should you call for help? Call 911 anytime you think you may need emergency care. For example, call if:  ? · You passed out (lost consciousness). ? · You pass maroon or very bloody stools. ? · You vomit blood or what looks like coffee grounds. ? · You have new, severe belly pain. ?Call your doctor now or seek immediate medical care if:  ? · Your pain gets worse, especially if it becomes focused in one area of your belly. ? · You have a new or higher fever. ? · Your stools are black and look like tar, or they have streaks of blood. ? · You have unexpected vaginal bleeding. ? · You have symptoms of a urinary tract infection. These may include:  ¨ Pain when you urinate. ¨ Urinating more often than usual.  ¨ Blood in your urine. ? · You are dizzy or lightheaded, or you feel like you may faint. ? Watch closely for changes in your health, and be sure to contact your doctor if:  ? · You are not getting better after 1 day (24 hours). Where can you learn more? Go to http://fernTigerlilylidia.info/. Enter F365 in the search box to learn more about \"Abdominal Pain: Care Instructions. \"  Current as of: March 20, 2017  Content Version: 11.4  © 6875-1383 [employer]. Care instructions adapted under license by [employer] (which disclaims liability or warranty for this information). If you have questions about a medical condition or this instruction, always ask your healthcare professional. [place] [address] any warranty or liability for your use of this information. Musculoskeletal Chest Pain: Care Instructions  Your Care Instructions    Chest pain is not always a sign that something is wrong with your heart or that you have another serious problem.  The doctor thinks your chest pain is caused by strained muscles or ligaments, inflamed chest cartilage, or another problem in your chest, rather than by your heart. You may need more tests to find the cause of your chest pain. Follow-up care is a key part of your treatment and safety. Be sure to make and go to all appointments, and call your doctor if you are having problems. It's also a good idea to know your test results and keep a list of the medicines you take. How can you care for yourself at home? · Take pain medicines exactly as directed. ¨ If the doctor gave you a prescription medicine for pain, take it as prescribed. ¨ If you are not taking a prescription pain medicine, ask your doctor if you can take an over-the-counter medicine. · Rest and protect the sore area. · Stop, change, or take a break from any activity that may be causing your pain or soreness. · Put ice or a cold pack on the sore area for 10 to 20 minutes at a time. Try to do this every 1 to 2 hours for the next 3 days (when you are awake) or until the swelling goes down. Put a thin cloth between the ice and your skin. · After 2 or 3 days, apply a heating pad set on low or a warm cloth to the area that hurts. Some doctors suggest that you go back and forth between hot and cold. · Do not wrap or tape your ribs for support. This may cause you to take smaller breaths, which could increase your risk of lung problems. · Mentholated creams such as Bengay or Icy Hot may soothe sore muscles. Follow the instructions on the package. · Follow your doctor's instructions for exercising. · Gentle stretching and massage may help you get better faster. Stretch slowly to the point just before pain begins, and hold the stretch for at least 15 to 30 seconds. Do this 3 or 4 times a day. Stretch just after you have applied heat. · As your pain gets better, slowly return to your normal activities. Any increased pain may be a sign that you need to rest a while longer. When should you call for help? Call 911 anytime you think you may need emergency care. For example, call if:  ? · You have chest pain or pressure. This may occur with:  ¨ Sweating. ¨ Shortness of breath. ¨ Nausea or vomiting. ¨ Pain that spreads from the chest to the neck, jaw, or one or both shoulders or arms. ¨ Dizziness or lightheadedness. ¨ A fast or uneven pulse. After calling 911, chew 1 adult-strength aspirin. Wait for an ambulance. Do not try to drive yourself. ? · You have sudden chest pain and shortness of breath, or you cough up blood. ?Call your doctor now or seek immediate medical care if:  ? · You have any trouble breathing. ? · Your chest pain gets worse. ? · Your chest pain occurs consistently with exercise and is relieved by rest.   ? Watch closely for changes in your health, and be sure to contact your doctor if:  ? · Your chest pain does not get better after 1 week. Where can you learn more? Go to http://fern-lidia.info/. Enter V293 in the search box to learn more about \"Musculoskeletal Chest Pain: Care Instructions. \"  Current as of: March 20, 2017  Content Version: 11.4  © 5040-2683 [employer]. Care instructions adapted under license by [employer] (which disclaims liability or warranty for this information). If you have questions about a medical condition or this instruction, always ask your healthcare professional. [address] any warranty or liability for your use of this information.

## 2018-07-06 NOTE — ED PROVIDER NOTES
EMERGENCY DEPARTMENT HISTORY AND PHYSICAL EXAM      Date: 7/6/2018  Patient Name: Marvel Mello    History of Presenting Illness     Chief Complaint   Patient presents with    Abdominal Pain     Mid upper that radiates to mid chest, dizziness, dry mouth, diaphoresis, and nausea X 1 day     History Provided By: Patient    HPI: Marvel Mello, 41 y.o. female with PMHx significant for HTN, CAD, asthma, and anxiety, presents via EMS to the ED with cc of sudden onset of abdominal pain alongside chest pain this morning. Pt additionally informs of nausea, diaphoresis, and dizziness. Per pt, she went to the use the restroom this morning when she noted discomfort to the mid-lower abdomen. Pt reports her abdominal pain presented with a moderate intensity alongside an associated sharp, aching sensation to the region. Pt informs following the abdominal pain, she experienced it radiating upwards into the diffuse chest with a moderate intensity alongside an associated sore, aching sensation. Pt reports she noted no alleviating or exacerbating factors for her discomfort. Pt does informs of nausea accompanying her abdominal pain alongside episodes of diaphoresis and dizziness following the chest pain. Pt reports the aforementioned resolved within minutes without reoccurrence. Pt reports no OTC medications or other notable modifying factors for her discomfort. Pt specifically denies any fevers, chills, vomiting, headaches, or SOB. PMHx: plantar fascitis, thyroid goiter, cerebral palsy, herniated disc  PSHx: carpal tunnel release, hysterectomy, tubal ligation   Social Hx: + EtOH; - Smoker; - Illicit Drugs    PCP: None    There are no other complaints, changes, or physical findings at this time. Current Outpatient Prescriptions   Medication Sig Dispense Refill    traMADol (ULTRAM) 50 mg tablet Take 1 Tab by mouth every six (6) hours as needed for Pain. Max Daily Amount: 200 mg.  Indications: Pain 10 Tab 0    ondansetron (ZOFRAN ODT) 4 mg disintegrating tablet Take 1 Tab by mouth every eight (8) hours as needed for Nausea. 10 Tab 0    acetaminophen (TYLENOL) 325 mg tablet Take 2 Tabs by mouth every four (4) hours as needed for Pain. 20 Tab 0    ibuprofen (MOTRIN) 600 mg tablet Take 1 Tab by mouth every six (6) hours as needed for Pain. 20 Tab 0    multivitamin (ONE A DAY) tablet Take 1 Tab by mouth daily.  butalbital-acetaminophen-caff (FIORICET) -40 mg per capsule Take 1 Cap by mouth every four (4) hours as needed for Headache. Max Daily Amount: 6 Caps. 12 Cap 0    fluticasone (FLONASE) 50 mcg/actuation nasal spray 2 Sprays by Both Nostrils route daily. 1 Bottle 11    montelukast (SINGULAIR) 10 mg tablet Take 1 Tab by mouth daily. 30 Tab 0    FLUoxetine (PROZAC) 20 mg tablet Take 1 Tab by mouth daily. 30 Tab 2    albuterol (PROVENTIL VENTOLIN) 2.5 mg /3 mL (0.083 %) nebulizer solution 3 mL by Nebulization route every four (4) hours as needed for Wheezing. 50 Each 0    gabapentin (NEURONTIN) 600 mg tablet Take 1 Tab by mouth three (3) times daily. 90 Tab 3    baclofen (LIORESAL) 10 mg tablet Take 1 Tab by mouth three (3) times daily. 90 Tab 3    metoprolol tartrate (LOPRESSOR) 25 mg tablet Take 1 Tab by mouth two (2) times a day. 60 Tab 1    SUMAtriptan (IMITREX) 100 mg tablet Take 1 Tab by mouth once as needed for Migraine. 10 Tab 4    topiramate (TOPAMAX) 50 mg tablet Take 1 Tab by mouth daily. 30 Tab 3    albuterol sulfate (PROAIR RESPICLICK) 90 mcg/actuation aepb Take 2 Puffs by inhalation every four (4) hours. 1 Inhaler 0    Nebulizer & Compressor machine Mask and tubing 1 Each 0    albuterol (PROVENTIL HFA, VENTOLIN HFA, PROAIR HFA) 90 mcg/actuation inhaler Take 1-2 Puffs by inhalation every four (4) hours as needed for Wheezing. 1 Inhaler 1    ranitidine (ZANTAC) 150 mg tablet Take 1 Tab by mouth two (2) times a day.  60 Tab 1    Ferrous Sulfate (FLOW FE) 47.5 mg iron TbER tablet Take 1 Tab by mouth daily. Take with vitamin C 30 Tab 1    ALPRAZolam (XANAX) 0.5 mg tablet Take 1 tablet by mouth daily as needed for Anxiety. 30 tablet 0     Past History     Past Medical History:  Past Medical History:   Diagnosis Date    Asthma 4/29/2010    Bronchitis     CAD (coronary artery disease)     Sinus Tach    Cerebral palsy (HCC) 4/29/2010    Cerebral palsy (HCC)     CP (cerebral palsy) (HCC)     Endocrine disease     Thyroid goiter    Goiter     Hypertension     Pt denies hypertension    Ill-defined condition     Plantar fascitis and heel spur    Lumbar disc herniation 4/29/2010    Migraine     Neurological disorder     cerebral palsy    Other ill-defined conditions(799.89)     pleurisy    Other ill-defined conditions(799.89)     Migraines    Psychiatric disorder     anxiety    Sinus tachycardia 5/23/2014    Thyroid disease      Past Surgical History:  Past Surgical History:   Procedure Laterality Date    HX HEENT      T & A    HX HYSTERECTOMY      HX ORTHOPAEDIC      carpal tunnel release    HX ORTHOPAEDIC      tendon repair R hand    HX ORTHOPAEDIC      pin in toe of R foot    HX TUBAL LIGATION      LAP,TUBAL CAUTERY       Family History:  Family History   Problem Relation Age of Onset    Asthma Mother     Hypertension Mother     Diabetes Mother     Cancer Father      prostate    Asthma Daughter     Asthma Daughter     Stroke Maternal Grandfather     Cancer Paternal Grandmother      throat     Social History:  Social History   Substance Use Topics    Smoking status: Never Smoker    Smokeless tobacco: Never Used    Alcohol use Yes      Comment: social     Allergies: Allergies   Allergen Reactions    Pcn [Penicillins] Hives     Swelling in hands      Review of Systems   Review of Systems   Constitutional: Positive for diaphoresis. Negative for chills and fever. HENT: Negative for congestion, rhinorrhea, sneezing and sore throat.     Eyes: Negative for redness and visual disturbance. Respiratory: Negative for shortness of breath. Cardiovascular: Positive for chest pain. Negative for leg swelling. Gastrointestinal: Positive for abdominal pain and nausea. Negative for vomiting. Genitourinary: Negative for difficulty urinating, dysuria, frequency, hematuria and urgency. Musculoskeletal: Negative for back pain, myalgias and neck pain. Skin: Negative for rash. Neurological: Positive for dizziness. Negative for syncope, weakness and headaches. Hematological: Negative for adenopathy. All other systems reviewed and are negative. Physical Exam   Physical Exam   Constitutional: She is oriented to person, place, and time. She appears well-developed and well-nourished. HENT:   Head: Normocephalic. Mouth/Throat: Oropharynx is clear and moist.   Eyes: Conjunctivae and EOM are normal. Pupils are equal, round, and reactive to light. Neck: Normal range of motion. Neck supple. Cardiovascular: Normal rate, regular rhythm, normal heart sounds and intact distal pulses. Pulmonary/Chest: Effort normal and breath sounds normal.   Abdominal: Soft. Bowel sounds are normal. She exhibits no distension. There is tenderness (R). There is no rebound. Musculoskeletal: Normal range of motion. She exhibits no edema or deformity. Neurological: She is alert and oriented to person, place, and time. Skin: Skin is warm and dry. Psychiatric: She has a normal mood and affect.  Her behavior is normal. Judgment and thought content normal.     Diagnostic Study Results   Labs -     Recent Results (from the past 12 hour(s))   EKG, 12 LEAD, INITIAL    Collection Time: 07/06/18  6:38 AM   Result Value Ref Range    Ventricular Rate 72 BPM    Atrial Rate 72 BPM    P-R Interval 170 ms    QRS Duration 68 ms    Q-T Interval 350 ms    QTC Calculation (Bezet) 383 ms    Calculated P Axis 63 degrees    Calculated R Axis 51 degrees    Calculated T Axis 45 degrees    Diagnosis Normal sinus rhythm  Normal ECG      CBC WITH AUTOMATED DIFF    Collection Time: 07/06/18  7:19 AM   Result Value Ref Range    WBC 6.1 3.6 - 11.0 K/uL    RBC 4.53 3.80 - 5.20 M/uL    HGB 11.8 11.5 - 16.0 g/dL    HCT 36.7 35.0 - 47.0 %    MCV 81.0 80.0 - 99.0 FL    MCH 26.0 26.0 - 34.0 PG    MCHC 32.2 30.0 - 36.5 g/dL    RDW 14.3 11.5 - 14.5 %    PLATELET 712 530 - 679 K/uL    MPV 12.5 8.9 - 12.9 FL    NRBC 0.0 0  WBC    ABSOLUTE NRBC 0.00 0.00 - 0.01 K/uL    NEUTROPHILS 63 32 - 75 %    LYMPHOCYTES 27 12 - 49 %    MONOCYTES 8 5 - 13 %    EOSINOPHILS 2 0 - 7 %    BASOPHILS 1 0 - 1 %    IMMATURE GRANULOCYTES 0 0.0 - 0.5 %    ABS. NEUTROPHILS 3.9 1.8 - 8.0 K/UL    ABS. LYMPHOCYTES 1.6 0.8 - 3.5 K/UL    ABS. MONOCYTES 0.5 0.0 - 1.0 K/UL    ABS. EOSINOPHILS 0.1 0.0 - 0.4 K/UL    ABS. BASOPHILS 0.0 0.0 - 0.1 K/UL    ABS. IMM. GRANS. 0.0 0.00 - 0.04 K/UL    DF AUTOMATED     PROTHROMBIN TIME + INR    Collection Time: 07/06/18  7:19 AM   Result Value Ref Range    INR 0.9 0.9 - 1.1      Prothrombin time 9.2 9.0 - 11.1 sec   D DIMER    Collection Time: 07/06/18  7:19 AM   Result Value Ref Range    D-dimer 0.51 0.00 - 0.65 mg/L FEU   METABOLIC PANEL, COMPREHENSIVE    Collection Time: 07/06/18  7:19 AM   Result Value Ref Range    Sodium 141 136 - 145 mmol/L    Potassium 4.2 3.5 - 5.1 mmol/L    Chloride 105 97 - 108 mmol/L    CO2 26 21 - 32 mmol/L    Anion gap 10 5 - 15 mmol/L    Glucose 90 65 - 100 mg/dL    BUN 11 6 - 20 MG/DL    Creatinine 0.76 0.55 - 1.02 MG/DL    BUN/Creatinine ratio 14 12 - 20      GFR est AA >60 >60 ml/min/1.73m2    GFR est non-AA >60 >60 ml/min/1.73m2    Calcium 8.5 8.5 - 10.1 MG/DL    Bilirubin, total 0.2 0.2 - 1.0 MG/DL    ALT (SGPT) 30 12 - 78 U/L    AST (SGOT) 18 15 - 37 U/L    Alk.  phosphatase 64 45 - 117 U/L    Protein, total 7.1 6.4 - 8.2 g/dL    Albumin 3.2 (L) 3.5 - 5.0 g/dL    Globulin 3.9 2.0 - 4.0 g/dL    A-G Ratio 0.8 (L) 1.1 - 2.2     NT-PRO BNP    Collection Time: 07/06/18  7:19 AM   Result Value Ref Range    NT pro-BNP 57 0 - 125 PG/ML   LIPASE    Collection Time: 07/06/18  7:19 AM   Result Value Ref Range    Lipase 86 73 - 393 U/L   URINALYSIS W/ RFLX MICROSCOPIC    Collection Time: 07/06/18  7:37 AM   Result Value Ref Range    Color YELLOW/STRAW      Appearance CLEAR CLEAR      Specific gravity 1.010 1.003 - 1.030      pH (UA) 8.0 5.0 - 8.0      Protein NEGATIVE  NEG mg/dL    Glucose NEGATIVE  NEG mg/dL    Ketone NEGATIVE  NEG mg/dL    Bilirubin NEGATIVE  NEG      Blood NEGATIVE  NEG      Urobilinogen 0.2 0.2 - 1.0 EU/dL    Nitrites NEGATIVE  NEG      Leukocyte Esterase NEGATIVE  NEG     HCG URINE, QL. - POC    Collection Time: 07/06/18  7:39 AM   Result Value Ref Range    Pregnancy test,urine (POC) NEGATIVE  NEG       Radiologic Studies -   XR ABD ACUTE W 1 V CHEST   Final Result        Xr Abd Acute W 1 V Chest    Result Date: 7/6/2018  IMPRESSION: Gas pattern is normal. There is no free air. Lungs are clear. Medical Decision Making   I am the first provider for this patient. I reviewed the vital signs, available nursing notes, past medical history, past surgical history, family history and social history. Vital Signs-Reviewed the patient's vital signs. Patient Vitals for the past 12 hrs:   Temp Pulse Resp BP SpO2   07/06/18 0621 98.4 °F (36.9 °C) 71 18 139/75 100 %     Pulse Oximetry Analysis - 100% on RA    Cardiac Monitor:   Rate: 71 bpm  Rhythm: Normal Sinus Rhythm      EKG interpretation: (0607)  Rhythm: normal sinus rhythm; and regular . Rate (approx.): 72; Axis: normal; CO interval: normal; QRS interval: normal ; ST/T wave: normal;   Written by Bee Hand ED Scribe, as dictated by Saman Rodriguez MD.    Records Reviewed: Nursing Notes and Old Medical Records    Provider Notes (Medical Decision Making):   DDx: arrhythmia, ACS, MSK, GERD, cholecystitis, gastritis     ED Course:   Initial assessment performed.  The patients presenting problems have been discussed, and they are in agreement with the care plan formulated and outlined with them. I have encouraged them to ask questions as they arise throughout their visit. Progress Note:   9:30 AM  Updated pt on all returned results and findings. Pt in agreement with the further progression of care plan and expresses agreement with and understanding of all items discussed. Written by Jolynn Sauceda, ED Scribe, as dictated by Mike Rincon MD.     Disposition:  DISCHARGE NOTE:    9:44 AM  The patient is ready for discharge. The patient signs, symptoms, diagnosis, and discharge instructions have been discussed and the patient has conveyed their understanding. The patient is to follow-up as reccommended or returned to the ER should their symptoms worsen. Plan has been discussed and patient is in agreement. PLAN:  1. Current Discharge Medication List      START taking these medications    Details   traMADol (ULTRAM) 50 mg tablet Take 1 Tab by mouth every six (6) hours as needed for Pain. Max Daily Amount: 200 mg. Indications: Pain  Qty: 10 Tab, Refills: 0    Associated Diagnoses: Abdominal pain, generalized; Chest wall pain      ondansetron (ZOFRAN ODT) 4 mg disintegrating tablet Take 1 Tab by mouth every eight (8) hours as needed for Nausea. Qty: 10 Tab, Refills: 0    Associated Diagnoses: Abdominal pain, generalized; Chest wall pain           2. Follow-up Information     Follow up With Details Comments Contact Info    None Call  None [phone] Patient stated that they have no PCP      [facility] [place] EMERGENCY DEPT  As needed, If symptoms worsen [address] [facility]  [phone]        Return to ED if worse     Diagnosis     Clinical Impression:   1. Abdominal pain, generalized    2. Chest wall pain      Attestations:   This note is prepared by Jolynn Sauceda, acting as Scribe for Mike Rincon MD.    Mike Rincon MD: The scribe's documentation has been prepared under my direction and personally reviewed by me in its entirety. I confirm that the note above accurately reflects all work, treatment, procedures, and medical decision making performed by me. This note will not be viewable in [address]

## 2018-08-09 ENCOUNTER — HOSPITAL ENCOUNTER (EMERGENCY)
Age: 48
Discharge: HOME | End: 2018-08-09
Attending: EMERGENCY MEDICINE
Payer: SELF-PAY

## 2018-08-09 VITALS
HEIGHT: 61 IN | HEART RATE: 92 BPM | TEMPERATURE: 98 F | WEIGHT: 165 LBS | OXYGEN SATURATION: 100 % | DIASTOLIC BLOOD PRESSURE: 76 MMHG | SYSTOLIC BLOOD PRESSURE: 119 MMHG | RESPIRATION RATE: 20 BRPM | BODY MASS INDEX: 31.15 KG/M2

## 2018-08-09 DIAGNOSIS — R05.9 COUGH: ICD-10-CM

## 2018-08-09 DIAGNOSIS — J01.90 ACUTE NON-RECURRENT SINUSITIS, UNSPECIFIED LOCATION: Primary | ICD-10-CM

## 2018-08-09 PROCEDURE — 99282 EMERGENCY DEPT VISIT SF MDM: CPT

## 2018-08-09 RX ORDER — OXYMETAZOLINE HCL 0.05 %
2 SPRAY, NON-AEROSOL (ML) NASAL 2 TIMES DAILY
Qty: 1 EACH | Refills: 0 | Status: SHIPPED | OUTPATIENT
Start: 2018-08-09 | End: 2018-08-12

## 2018-08-09 RX ORDER — AZITHROMYCIN 250 MG/1
TABLET, FILM COATED ORAL
Qty: 6 TAB | Refills: 0 | Status: SHIPPED | OUTPATIENT
Start: 2018-08-09 | End: 2018-08-14

## 2018-08-09 NOTE — ED NOTES
Discharge instructions were given to the patient by Junito Chavez RN. The patient left the Emergency Department ambulatory, alert and oriented and in no acute distress with 3 prescription(s). The patient was encouraged to call or return to the ED for worsening symptoms or problems and was encouraged to schedule a follow up appointment for continuing care. Patient leaving ED accompanied by self. The patient verbalized understanding of discharge instructions and prescriptions, all questions were answered. The patient has no further concerns at this time. Patient declined wheelchair transfer upon ED discharge.

## 2018-08-09 NOTE — DISCHARGE INSTRUCTIONS
Saline Nasal Washes: Care Instructions  Your Care Instructions  Saline nasal washes help keep the nasal passages open by washing out thick or dried mucus. This simple remedy can help relieve symptoms of allergies, sinusitis, and colds. It also can make the nose feel more comfortable by keeping the mucous membranes moist. You may notice a little burning sensation in your nose the first few times you use the solution, but this usually gets better in a few days. Follow-up care is a key part of your treatment and safety. Be sure to make and go to all appointments, and call your doctor if you are having problems. It's also a good idea to know your test results and keep a list of the medicines you take. How can you care for yourself at home? · You can buy premixed saline solution in a squeeze bottle or other sinus rinse products at a drugstore. Read and follow the instructions on the label. · You also can make your own saline solution by adding 1 teaspoon of salt and 1 teaspoon of baking soda to 2 cups of distilled water. · If you use a homemade solution, pour a small amount into a clean bowl. Using a rubber bulb syringe, squeeze the syringe and place the tip in the salt water. Pull a small amount of the salt water into the syringe by relaxing your hand. · Sit down with your head tilted slightly back. Do not lie down. Put the tip of the bulb syringe or the squeeze bottle a little way into one of your nostrils. Gently drip or squirt a few drops into the nostril. Repeat with the other nostril. Some sneezing and gagging are normal at first.  · Gently blow your nose. · Wipe the syringe or bottle tip clean after each use. · Repeat this 2 or 3 times a day. · Use nasal washes gently if you have nosebleeds often. When should you call for help? Watch closely for changes in your health, and be sure to contact your doctor if:    · You often get nosebleeds.     · You have problems doing the nasal washes.    Where can you learn more? Go to http://fern-lidia.info/. Enter [id number] in the search box to learn more about \"Saline Nasal Washes: Care Instructions. \"  Current as of: May 12, 2017  Content Version: 11.7  © 1547-7942 [employer]. Care instructions adapted under license by [employer] (which disclaims liability or warranty for this information). If you have questions about a medical condition or this instruction, always ask your healthcare professional. [address] any warranty or liability for your use of this information.

## 2018-08-09 NOTE — ED PROVIDER NOTES
EMERGENCY DEPARTMENT HISTORY AND PHYSICAL EXAM    Date: 8/9/2018  Patient Name: Alesha Dowling    History of Presenting Illness     Chief Complaint   Patient presents with    Sinus Pain    Cough    Ear Pain         History Provided By: Patient    Chief Complaint ear paln  Duration: 3 Days  Timing:  Acute  Location: left ear  Quality: Aching  Severity: 8 out of 10  Modifying Factors: none  Associated Symptoms: sinus pain nasal congestion cough      HPI: Alesha Dowling is a 41 y.o. female with a PMH of No significant past medical history who presents with ear ache nasal congestion cough for three days. Taking ibuprofen without relief. PCP: None    Current Outpatient Prescriptions   Medication Sig Dispense Refill    oxymetazoline (AFRIN, OXYMETAZOLINE,) 0.05 % nasal spray 2 Sprays by Both Nostrils route two (2) times a day for 3 days. 1 Each 0    dextromethorphan-guaiFENesin (ROBITUSSIN-DM)  mg/5 mL syrup Take 10 mL by mouth every six (6) hours as needed for Cough. 1 Bottle 0    azithromycin (ZITHROMAX) 250 mg tablet As directed zpack 6 Tab 0    FLUoxetine (PROZAC) 20 mg tablet Take 1 Tab by mouth daily. 30 Tab 2    gabapentin (NEURONTIN) 600 mg tablet Take 1 Tab by mouth three (3) times daily. 90 Tab 3    baclofen (LIORESAL) 10 mg tablet Take 1 Tab by mouth three (3) times daily. 90 Tab 3    metoprolol tartrate (LOPRESSOR) 25 mg tablet Take 1 Tab by mouth two (2) times a day. 60 Tab 1    traMADol (ULTRAM) 50 mg tablet Take 1 Tab by mouth every six (6) hours as needed for Pain. Max Daily Amount: 200 mg. Indications: Pain 10 Tab 0    ondansetron (ZOFRAN ODT) 4 mg disintegrating tablet Take 1 Tab by mouth every eight (8) hours as needed for Nausea. 10 Tab 0    acetaminophen (TYLENOL) 325 mg tablet Take 2 Tabs by mouth every four (4) hours as needed for Pain. 20 Tab 0    ibuprofen (MOTRIN) 600 mg tablet Take 1 Tab by mouth every six (6) hours as needed for Pain.  20 Tab 0    multivitamin (ONE A DAY) tablet Take 1 Tab by mouth daily.  butalbital-acetaminophen-caff (FIORICET) -40 mg per capsule Take 1 Cap by mouth every four (4) hours as needed for Headache. Max Daily Amount: 6 Caps. 12 Cap 0    fluticasone (FLONASE) 50 mcg/actuation nasal spray 2 Sprays by Both Nostrils route daily. 1 Bottle 11    montelukast (SINGULAIR) 10 mg tablet Take 1 Tab by mouth daily. 30 Tab 0    albuterol (PROVENTIL VENTOLIN) 2.5 mg /3 mL (0.083 %) nebulizer solution 3 mL by Nebulization route every four (4) hours as needed for Wheezing. 50 Each 0    SUMAtriptan (IMITREX) 100 mg tablet Take 1 Tab by mouth once as needed for Migraine. 10 Tab 4    topiramate (TOPAMAX) 50 mg tablet Take 1 Tab by mouth daily. 30 Tab 3    albuterol sulfate (PROAIR RESPICLICK) 90 mcg/actuation aepb Take 2 Puffs by inhalation every four (4) hours. 1 Inhaler 0    Nebulizer & Compressor machine Mask and tubing 1 Each 0    albuterol (PROVENTIL HFA, VENTOLIN HFA, PROAIR HFA) 90 mcg/actuation inhaler Take 1-2 Puffs by inhalation every four (4) hours as needed for Wheezing. 1 Inhaler 1    ranitidine (ZANTAC) 150 mg tablet Take 1 Tab by mouth two (2) times a day. 60 Tab 1    Ferrous Sulfate (FLOW FE) 47.5 mg iron TbER tablet Take 1 Tab by mouth daily. Take with vitamin C 30 Tab 1    ALPRAZolam (XANAX) 0.5 mg tablet Take 1 tablet by mouth daily as needed for Anxiety.  30 tablet 0       Past History     Past Medical History:  Past Medical History:   Diagnosis Date    Asthma 4/29/2010    Bronchitis     CAD (coronary artery disease)     Sinus Tach    Cerebral palsy ([address] 4/29/2010    Cerebral palsy (HCC)     CP (cerebral palsy) (HCC)     Endocrine disease     Thyroid goiter    Goiter     Hypertension     Pt denies hypertension    Ill-defined condition     Plantar fascitis and heel spur    Lumbar disc herniation 4/29/2010    Migraine     Neurological disorder     cerebral palsy    Other ill-defined conditions(799.89)     pleurisy    Other ill-defined conditions(799.89)     Migraines    Psychiatric disorder     anxiety    Sinus tachycardia 5/23/2014    Thyroid disease        Past Surgical History:  Past Surgical History:   Procedure Laterality Date    HX HEENT      T & A    HX HYSTERECTOMY      HX ORTHOPAEDIC      carpal tunnel release    HX ORTHOPAEDIC      tendon repair R hand    HX ORTHOPAEDIC      pin in toe of R foot    HX TUBAL LIGATION      LAP,TUBAL CAUTERY         Family History:  Family History   Problem Relation Age of Onset   [facility] Asthma Mother     Hypertension Mother     Diabetes Mother     Cancer Father      prostate    Asthma Daughter     Asthma Daughter     Stroke Maternal Grandfather     Cancer Paternal Grandmother      throat       Social History:  Social History   Substance Use Topics    Smoking status: Never Smoker    Smokeless tobacco: Never Used    Alcohol use Yes      Comment: social       Allergies: Allergies   Allergen Reactions    Pcn [Penicillins] Hives     Swelling in hands          Review of Systems   Review of Systems   Constitutional: Negative for fatigue and fever. HENT: Positive for congestion, ear pain and rhinorrhea. Negative for ear discharge and sore throat. Eyes: Negative for discharge and redness. Respiratory: Positive for cough. Negative for shortness of breath and wheezing. Cardiovascular: Negative for chest pain and palpitations. Gastrointestinal: Negative for abdominal pain. Musculoskeletal: Negative for arthralgias, myalgias, neck pain and neck stiffness. Skin: Negative for pallor and rash. Neurological: Negative for dizziness, tremors, weakness and headaches. Hematological: Negative for adenopathy. All other systems reviewed and are negative.       Physical Exam     Vitals:    08/09/18 1210   BP: 119/76   Pulse: 92   Resp: 20   Temp: 98 °F (36.7 °C)   SpO2: 100%   Weight: 74.8 kg (165 lb)   Height: 5' 1\" (1.549 m)     Physical Exam   Constitutional: She is oriented to person, place, and time. She appears well-developed and well-nourished. No distress. HENT:   Head: Normocephalic and atraumatic. Right Ear: External ear normal. Tympanic membrane is bulging. Left Ear: External ear normal.   Nose: Rhinorrhea present. Mouth/Throat: Oropharynx is clear and moist.   Eyes: Conjunctivae are normal.   Neck: Normal range of motion. Neck supple. Cardiovascular: Normal rate and regular rhythm. Pulmonary/Chest: Effort normal and breath sounds normal. No respiratory distress. She has no wheezes. Abdominal: Soft. Bowel sounds are normal. There is no tenderness. Musculoskeletal: Normal range of motion. Lymphadenopathy:     She has no cervical adenopathy. Neurological: She is alert and oriented to person, place, and time. No cranial nerve deficit. Coordination normal.   Skin: Skin is warm and dry. No rash noted. Psychiatric: She has a normal mood and affect. Her behavior is normal. Judgment and thought content normal.   Nursing note and vitals reviewed. Diagnostic Study Results     Labs -   No results found for this or any previous visit (from the past 12 hour(s)). Radiologic Studies -   No orders to display     CT Results  (Last 48 hours)    None        CXR Results  (Last 48 hours)    None            Medical Decision Making   I am the first provider for this patient. I reviewed the vital signs, available nursing notes, past medical history, past surgical history, family history and social history. Vital Signs-Reviewed the patient's vital signs. Records Reviewed: Nursing Notes    ED Course:   stable  Disposition:  home    DISCHARGE NOTE:         Care plan outlined and precautions discussed. Patient has no new complaints, changes, or physical findings. All medications were reviewed with the patient; will d/c home with zpack robitussin. All of pt's questions and concerns were addressed.  Patient was instructed and agrees to follow up with PCP, as well as to return to the ED upon further deterioration. Patient is ready to go home. Follow-up Information     Follow up With Details Comments Contact Info    [address] [facility] In 2 days  [address]  [phone]          Discharge Medication List as of 8/9/2018 12:43 PM      START taking these medications    Details   oxymetazoline (AFRIN, OXYMETAZOLINE,) 0.05 % nasal spray 2 Sprays by Both Nostrils route two (2) times a day for 3 days. , Normal, Disp-1 Each, R-0      dextromethorphan-guaiFENesin (ROBITUSSIN-DM)  mg/5 mL syrup Take 10 mL by mouth every six (6) hours as needed for Cough., Normal, Disp-1 Bottle, R-0      azithromycin (ZITHROMAX) 250 mg tablet As directed zpack, Normal, Disp-6 Tab, R-0         CONTINUE these medications which have NOT CHANGED    Details   FLUoxetine (PROZAC) 20 mg tablet Take 1 Tab by mouth daily. , Normal, Disp-30 Tab, R-2      gabapentin (NEURONTIN) 600 mg tablet Take 1 Tab by mouth three (3) times daily. , Normal, Disp-90 Tab, R-3      baclofen (LIORESAL) 10 mg tablet Take 1 Tab by mouth three (3) times daily. , Normal, Disp-90 Tab, R-3      metoprolol tartrate (LOPRESSOR) 25 mg tablet Take 1 Tab by mouth two (2) times a day., Normal, Disp-60 Tab, R-1      traMADol (ULTRAM) 50 mg tablet Take 1 Tab by mouth every six (6) hours as needed for Pain. Max Daily Amount: 200 mg. Indications: Pain, Print, Disp-10 Tab, R-0      ondansetron (ZOFRAN ODT) 4 mg disintegrating tablet Take 1 Tab by mouth every eight (8) hours as needed for Nausea. , Print, Disp-10 Tab, R-0      acetaminophen (TYLENOL) 325 mg tablet Take 2 Tabs by mouth every four (4) hours as needed for Pain., Print, Disp-20 Tab, R-0      ibuprofen (MOTRIN) 600 mg tablet Take 1 Tab by mouth every six (6) hours as needed for Pain., Print, Disp-20 Tab, R-0      multivitamin (ONE A DAY) tablet Take 1 Tab by mouth daily. , Historical Med      butalbital-acetaminophen-caff (FIORICET) -40 mg per capsule Take 1 Cap by mouth every four (4) hours as needed for Headache. Max Daily Amount: 6 Caps., Print, Disp-12 Cap, R-0      fluticasone (FLONASE) 50 mcg/actuation nasal spray 2 Sprays by Both Nostrils route daily. , Normal, Disp-1 Bottle, R-11      montelukast (SINGULAIR) 10 mg tablet Take 1 Tab by mouth daily. , Normal, Disp-30 Tab, R-0      albuterol (PROVENTIL VENTOLIN) 2.5 mg /3 mL (0.083 %) nebulizer solution 3 mL by Nebulization route every four (4) hours as needed for Wheezing., Normal, Disp-50 Each, R-0      SUMAtriptan (IMITREX) 100 mg tablet Take 1 Tab by mouth once as needed for Migraine., Normal, Disp-10 Tab, R-4      topiramate (TOPAMAX) 50 mg tablet Take 1 Tab by mouth daily. , Normal, Disp-30 Tab, R-3      albuterol sulfate (PROAIR RESPICLICK) 90 mcg/actuation aepb Take 2 Puffs by inhalation every four (4) hours. , Print, Disp-1 Inhaler, R-0      Nebulizer & Compressor machine Mask and tubing, Print, Disp-1 Each, R-0      albuterol (PROVENTIL HFA, VENTOLIN HFA, PROAIR HFA) 90 mcg/actuation inhaler Take 1-2 Puffs by inhalation every four (4) hours as needed for Wheezing., Normal, Disp-1 Inhaler, R-1      ranitidine (ZANTAC) 150 mg tablet Take 1 Tab by mouth two (2) times a day., Normal, Disp-60 Tab, R-1      Ferrous Sulfate (FLOW FE) 47.5 mg iron TbER tablet Take 1 Tab by mouth daily. Take with vitamin C, Normal, Disp-30 Tab, R-1      ALPRAZolam (XANAX) 0.5 mg tablet Take 1 tablet by mouth daily as needed for Anxiety. , Print, Disp-30 tablet, R-0             Provider Notes (Medical Decision Making):   DDX URI sinusitis bronchitis AOM  Procedures:  Procedures        Diagnosis     Clinical Impression:   1. Acute non-recurrent sinusitis, unspecified location    2.  Cough

## 2018-08-09 NOTE — ED NOTES
Patient complains of left ear pain, nasal congestion, and headache x 3 days. Patient states she has a hisotry of migraines. Patient took ibuprofen with no relief. Emergency Department Nursing Plan of Care       The Nursing Plan of Care is developed from the Nursing assessment and Emergency Department Attending provider initial evaluation. The plan of care may be reviewed in the ED Provider note.     The Plan of Care was developed with the following considerations:   Patient / Family readiness to learn indicated by:verbalized understanding  Persons(s) to be included in education: patient  Barriers to Learning/Limitations:No    Signed     Odalys Llamas Mt    8/9/2018   12:24 PM

## 2018-08-09 NOTE — LETTER
[facility] EMERGENCY DEPT 
[address] [facility] [address] [id number] [phone] Work/School Note Date: 8/9/2018 To Whom It May concern: 
 
Buddy Fall was seen and treated today in the emergency room by the following provider(s): 
Attending Provider: Rosemary May MD 
Nurse Practitioner: Minus Shown, NP. Buddy Fall may return to work on 8-13 . Sincerely, Minus Shown, NP

## 2018-12-13 ENCOUNTER — HOSPITAL ENCOUNTER (EMERGENCY)
Age: 48
Discharge: HOME | End: 2018-12-13
Attending: EMERGENCY MEDICINE
Payer: SELF-PAY

## 2018-12-13 VITALS
DIASTOLIC BLOOD PRESSURE: 68 MMHG | RESPIRATION RATE: 18 BRPM | OXYGEN SATURATION: 100 % | HEIGHT: 62 IN | WEIGHT: 160 LBS | HEART RATE: 82 BPM | TEMPERATURE: 98.4 F | SYSTOLIC BLOOD PRESSURE: 126 MMHG | BODY MASS INDEX: 29.44 KG/M2

## 2018-12-13 DIAGNOSIS — J20.9 ACUTE BRONCHITIS, UNSPECIFIED ORGANISM: Primary | ICD-10-CM

## 2018-12-13 PROCEDURE — 99282 EMERGENCY DEPT VISIT SF MDM: CPT

## 2018-12-13 RX ORDER — AZITHROMYCIN 250 MG/1
TABLET, FILM COATED ORAL
Qty: 6 TAB | Refills: 0 | Status: SHIPPED | OUTPATIENT
Start: 2018-12-13 | End: 2021-12-28

## 2018-12-13 NOTE — ED PROVIDER NOTES
EMERGENCY DEPARTMENT HISTORY AND PHYSICAL EXAM      Date: 12/13/2018  Patient Name: Lazarus Scudder    History of Presenting Illness     Chief Complaint   Patient presents with    Ear Pain     lt ear pain since Monday    Cough     productive cough since Monday, denies fevers       History Provided By: Patient    HPI: Lazarus Scudder, 41 y.o. female with PMHx significant for bronchitis, CP, asthma, anxiety, CAD, presents ambulatory to the ED with cc of persistent left ear pain with associated muffled hearing x 4 days. She states she has had a recurrent fever, tmax 101, for which she has been alternating Tylenol and Advil (last dose was last night). Pt also reports sinus congestion, pain, and pressure. She explains her current sx's are exacerbated with tilting her head to either side as it causes increase in pressure. She reports prior hx of ear infections and states Z-paks work well for her. She denies tobacco use. Pt specifically denies any sore throat. There are no other complaints, changes, or physical findings at this time. PCP: None    Current Outpatient Medications   Medication Sig Dispense Refill    azithromycin (ZITHROMAX Z-ANGIE) 250 mg tablet Take two tablets today then one tablet daily 6 Tab 0    dextromethorphan-guaiFENesin (ROBITUSSIN-DM)  mg/5 mL syrup Take 10 mL by mouth every six (6) hours as needed for Cough. 1 Bottle 0    traMADol (ULTRAM) 50 mg tablet Take 1 Tab by mouth every six (6) hours as needed for Pain. Max Daily Amount: 200 mg. Indications: Pain 10 Tab 0    ondansetron (ZOFRAN ODT) 4 mg disintegrating tablet Take 1 Tab by mouth every eight (8) hours as needed for Nausea. 10 Tab 0    acetaminophen (TYLENOL) 325 mg tablet Take 2 Tabs by mouth every four (4) hours as needed for Pain. 20 Tab 0    ibuprofen (MOTRIN) 600 mg tablet Take 1 Tab by mouth every six (6) hours as needed for Pain. 20 Tab 0    multivitamin (ONE A DAY) tablet Take 1 Tab by mouth daily.       butalbital-acetaminophen-caff (FIORICET) -40 mg per capsule Take 1 Cap by mouth every four (4) hours as needed for Headache. Max Daily Amount: 6 Caps. 12 Cap 0    fluticasone (FLONASE) 50 mcg/actuation nasal spray 2 Sprays by Both Nostrils route daily. 1 Bottle 11    montelukast (SINGULAIR) 10 mg tablet Take 1 Tab by mouth daily. 30 Tab 0    FLUoxetine (PROZAC) 20 mg tablet Take 1 Tab by mouth daily. 30 Tab 2    albuterol (PROVENTIL VENTOLIN) 2.5 mg /3 mL (0.083 %) nebulizer solution 3 mL by Nebulization route every four (4) hours as needed for Wheezing. 50 Each 0    gabapentin (NEURONTIN) 600 mg tablet Take 1 Tab by mouth three (3) times daily. 90 Tab 3    baclofen (LIORESAL) 10 mg tablet Take 1 Tab by mouth three (3) times daily. 90 Tab 3    metoprolol tartrate (LOPRESSOR) 25 mg tablet Take 1 Tab by mouth two (2) times a day. 60 Tab 1    SUMAtriptan (IMITREX) 100 mg tablet Take 1 Tab by mouth once as needed for Migraine. 10 Tab 4    topiramate (TOPAMAX) 50 mg tablet Take 1 Tab by mouth daily. 30 Tab 3    albuterol sulfate (PROAIR RESPICLICK) 90 mcg/actuation aepb Take 2 Puffs by inhalation every four (4) hours. 1 Inhaler 0    Nebulizer & Compressor machine Mask and tubing 1 Each 0    albuterol (PROVENTIL HFA, VENTOLIN HFA, PROAIR HFA) 90 mcg/actuation inhaler Take 1-2 Puffs by inhalation every four (4) hours as needed for Wheezing. 1 Inhaler 1    ranitidine (ZANTAC) 150 mg tablet Take 1 Tab by mouth two (2) times a day. 60 Tab 1    Ferrous Sulfate (FLOW FE) 47.5 mg iron TbER tablet Take 1 Tab by mouth daily. Take with vitamin C 30 Tab 1    ALPRAZolam (XANAX) 0.5 mg tablet Take 1 tablet by mouth daily as needed for Anxiety.  30 tablet 0       Past History     Past Medical History:  Past Medical History:   Diagnosis Date    Asthma 4/29/2010    Bronchitis     CAD (coronary artery disease)     Sinus Tach    Cerebral [address] ([facility] [address] 4/29/2010    Cerebral palsy (HCC)     CP (cerebral palsy) ([facility]  Endocrine disease     Thyroid goiter    Goiter     Hypertension     Pt denies hypertension    Ill-defined condition     Plantar fascitis and heel spur    Lumbar disc herniation 4/29/2010    Migraine     Neurological disorder     cerebral palsy    Other ill-defined conditions(799.89)     pleurisy    Other ill-defined conditions(799.89)     Migraines    Psychiatric disorder     anxiety    Sinus tachycardia 5/23/2014    Thyroid disease        Past Surgical History:  Past Surgical History:   Procedure Laterality Date    HX HEENT      T & A    HX HYSTERECTOMY      HX ORTHOPAEDIC      carpal tunnel release    HX ORTHOPAEDIC      tendon repair R hand    HX ORTHOPAEDIC      pin in toe of R foot    HX TUBAL LIGATION      LAP,TUBAL CAUTERY         Family History:  Family History   Problem Relation Age of Onset   [facility] Asthma Mother     Hypertension Mother     Diabetes Mother     Cancer Father         prostate    Asthma Daughter     Asthma Daughter     Stroke Maternal Grandfather     Cancer Paternal Grandmother         throat       Social History:  Social History     Tobacco Use    Smoking status: Never Smoker    Smokeless tobacco: Never Used   Substance Use Topics    Alcohol use: Yes     Comment: social    Drug use: No       Allergies: Allergies   Allergen Reactions    Pcn [Penicillins] Hives     Swelling in hands          Review of Systems   Review of Systems   Constitutional: Positive for fever. Negative for activity change, chills and diaphoresis. HENT: Positive for congestion, ear pain, hearing loss, sinus pressure and sinus pain. Negative for sore throat, trouble swallowing and voice change. Eyes: Negative. Respiratory: Negative. Negative for chest tightness and shortness of breath. Cardiovascular: Negative. Negative for chest pain, palpitations and leg swelling. Gastrointestinal: Negative. Negative for constipation, nausea and vomiting. Endocrine: Negative. Genitourinary: Negative. Negative for flank pain, frequency and hematuria. Musculoskeletal: Negative. Skin: Negative. Allergic/Immunologic: Negative. Neurological: Negative. Hematological: Negative. Psychiatric/Behavioral: Negative. All other systems reviewed and are negative. Physical Exam   Physical Exam   Constitutional: She is oriented to person, place, and time. She appears well-developed and well-nourished. HENT:   Head: Normocephalic. Right Ear: Tympanic membrane normal.   Left Ear: Tympanic membrane normal.   Mouth/Throat: Oropharynx is clear and moist.   Eyes: Conjunctivae and EOM are normal. Pupils are equal, round, and reactive to light. Neck: Normal range of motion. Neck supple. Cardiovascular: Normal rate, regular rhythm, normal heart sounds and intact distal pulses. Pulmonary/Chest: Effort normal and breath sounds normal.   Abdominal: Soft. Bowel sounds are normal. She exhibits no distension. There is no rebound. Musculoskeletal: Normal range of motion. She exhibits no edema or deformity. Neurological: She is alert and oriented to person, place, and time. Skin: Skin is warm and dry. Psychiatric: She has a normal mood and affect. Her behavior is normal. Judgment and thought content normal.       Medical Decision Making   I am the first provider for this patient. I reviewed the vital signs, available nursing notes, past medical history, past surgical history, family history and social history. Vital Signs-Reviewed the patient's vital signs. Patient Vitals for the past 12 hrs:   Temp Pulse Resp BP SpO2   12/13/18 0739 98.4 °F (36.9 °C) 82 18 126/68 100 %       Pulse Oximetry Analysis - 100% on RA    Records Reviewed: Nursing Notes and Old Medical Records    Provider Notes (Medical Decision Making):   DDx: bronchitis, URI, otitis media    ED Course:   Initial assessment performed.  The patients presenting problems have been discussed, and they are in agreement with the care plan formulated and outlined with them. I have encouraged them to ask questions as they arise throughout their visit. Critical Care Time: 0    Disposition:  DISCHARGE NOTE:  8:16 AM  The patient is ready for discharge. The patients signs, symptoms, diagnosis, and instructions for discharge have been discussed and the pt has conveyed their understanding. The patient is to follow up as recommended with PCP or return to the ER should their symptoms worsen. Plan has been discussed and patient has conveyed their agreement. PLAN:  1. Discharge home   Current Discharge Medication List      START taking these medications    Details   azithromycin (ZITHROMAX Z-ANGIE) 250 mg tablet Take two tablets today then one tablet daily  Qty: 6 Tab, Refills: 0           2. Follow-up Information     Follow up With Specialties Details Why Contact Info    None  Call  None  Patient stated that they have no PCP      [facility] [place] EMERGENCY DEPT Emergency Medicine  As needed, If symptoms worsen Vadim Jacobo  [phone]        Return to ED if worse     Diagnosis     Clinical Impression:   1. Acute bronchitis, unspecified organism        Attestations: This note is prepared by Nasrin Jeffers, acting as Scribe for Emmanuel Conroy MD.    Emmanuel Conroy MD: The scribe's documentation has been prepared under my direction and personally reviewed by me in its entirety. I confirm that the note above accurately reflects all work, treatment, procedures, and medical decision making performed by me.

## 2018-12-13 NOTE — DISCHARGE INSTRUCTIONS
Bronchitis: Care Instructions  Your Care Instructions    Bronchitis is inflammation of the bronchial tubes, which carry air to the lungs. The tubes swell and produce mucus, or phlegm. The mucus and inflamed bronchial tubes make you cough. You may have trouble breathing. Most cases of bronchitis are caused by viruses like those that cause colds. Antibiotics usually do not help and they may be harmful. Bronchitis usually develops rapidly and lasts about 2 to 3 weeks in otherwise healthy people. Follow-up care is a key part of your treatment and safety. Be sure to make and go to all appointments, and call your doctor if you are having problems. It's also a good idea to know your test results and keep a list of the medicines you take. How can you care for yourself at home? · Take all medicines exactly as prescribed. Call your doctor if you think you are having a problem with your medicine. · Get some extra rest.  · Take an over-the-counter pain medicine, such as acetaminophen (Tylenol), ibuprofen (Advil, Motrin), or naproxen (Aleve) to reduce fever and relieve body aches. Read and follow all instructions on the label. · Do not take two or more pain medicines at the same time unless the doctor told you to. Many pain medicines have acetaminophen, which is Tylenol. Too much acetaminophen (Tylenol) can be harmful. · Take an over-the-counter cough medicine that contains dextromethorphan to help quiet a dry, hacking cough so that you can sleep. Avoid cough medicines that have more than one active ingredient. Read and follow all instructions on the label. · Breathe moist air from a humidifier, hot shower, or sink filled with hot water. The heat and moisture will thin mucus so you can cough it out. · Do not smoke. Smoking can make bronchitis worse. If you need help quitting, talk to your doctor about stop-smoking programs and medicines. These can increase your chances of quitting for good.   When should you call for help? Call 911 anytime you think you may need emergency care. For example, call if:    · You have severe trouble breathing.    Call your doctor now or seek immediate medical care if:    · You have new or worse trouble breathing.     · You cough up dark brown or bloody mucus (sputum).     · You have a new or higher fever.     · You have a new rash.    Watch closely for changes in your health, and be sure to contact your doctor if:    · You cough more deeply or more often, especially if you notice more mucus or a change in the color of your mucus.     · You are not getting better as expected. Where can you learn more? Go to http://fern-lidia.info/. Enter H333 in the search box to learn more about \"Bronchitis: Care Instructions. \"  Current as of: December 6, 2017  Content Version: 11.8  © 8999-4943 [employer]. Care instructions adapted under license by [employer] (which disclaims liability or warranty for this information). If you have questions about a medical condition or this instruction, always ask your healthcare professional. [address] any warranty or liability for your use of this information.

## 2018-12-13 NOTE — ED NOTES
Patient presents to the ED with c/o left ear pain and nasal congestion since Monday. Pt reports taking tylenol. Pt denies a sore throat. Pt reports a fever of 101.0 yesterday. Pt reports coughing up yellow mucus. Pt is alert and oriented. Pt skin is warm and dry. Pt is ambulatory independently. Emergency Department Nursing Plan of Care       The Nursing Plan of Care is developed from the Nursing assessment and Emergency Department Attending provider initial evaluation. The plan of care may be reviewed in the ED Provider note.     The Plan of Care was developed with the following considerations:   Patient / Family readiness to learn indicated by:verbalized understanding  Persons(s) to be included in education: patient  Barriers to Learning/Limitations:No    Signed     Berl Ek    12/13/2018   8:12 AM

## 2018-12-13 NOTE — LETTER
[facility] EMERGENCY DEPT 
[address] [facility] [address] [id number] [phone] Work/School Note Date: 12/13/2018 To Whom It May concern: 
 
Meenu Moise was seen and treated today in the emergency room by the following provider(s): 
Attending Provider: Maximo Becker MD. Meenu Moise may return to work on 12/15/18. Sincerely, 
 
 
 
 
Cassandra Bryan

## 2021-12-23 ENCOUNTER — APPOINTMENT (OUTPATIENT)
Dept: GENERAL RADIOLOGY | Age: 51
End: 2021-12-23
Attending: EMERGENCY MEDICINE
Payer: MEDICAID

## 2021-12-23 ENCOUNTER — HOSPITAL ENCOUNTER (OUTPATIENT)
Age: 51
Setting detail: OBSERVATION
Discharge: HOME HEALTH | End: 2021-12-28
Attending: EMERGENCY MEDICINE | Admitting: HOSPITALIST
Payer: MEDICAID

## 2021-12-23 ENCOUNTER — APPOINTMENT (OUTPATIENT)
Dept: MRI IMAGING | Age: 51
End: 2021-12-23
Attending: HOSPITALIST
Payer: MEDICAID

## 2021-12-23 ENCOUNTER — APPOINTMENT (OUTPATIENT)
Dept: CT IMAGING | Age: 51
End: 2021-12-23
Attending: EMERGENCY MEDICINE
Payer: MEDICAID

## 2021-12-23 DIAGNOSIS — R47.1 DYSARTHRIA: ICD-10-CM

## 2021-12-23 DIAGNOSIS — R20.2 NUMBNESS AND TINGLING: ICD-10-CM

## 2021-12-23 DIAGNOSIS — R53.1 WEAKNESS: Primary | ICD-10-CM

## 2021-12-23 DIAGNOSIS — R20.0 NUMBNESS AND TINGLING: ICD-10-CM

## 2021-12-23 PROBLEM — G45.9 TIA (TRANSIENT ISCHEMIC ATTACK): Status: ACTIVE | Noted: 2021-12-23

## 2021-12-23 LAB
ALBUMIN SERPL-MCNC: 3.8 G/DL (ref 3.5–5)
ALBUMIN/GLOB SERPL: 0.9 {RATIO} (ref 1.1–2.2)
ALP SERPL-CCNC: 111 U/L (ref 45–117)
ALT SERPL-CCNC: 40 U/L (ref 12–78)
AMPHET UR QL SCN: NEGATIVE
ANION GAP SERPL CALC-SCNC: 7 MMOL/L (ref 5–15)
APPEARANCE UR: CLEAR
APTT PPP: 30.2 SEC (ref 22.1–31)
AST SERPL-CCNC: 10 U/L (ref 15–37)
BARBITURATES UR QL SCN: NEGATIVE
BASOPHILS # BLD: 0.1 K/UL (ref 0–0.1)
BASOPHILS NFR BLD: 1 % (ref 0–1)
BENZODIAZ UR QL: NEGATIVE
BILIRUB SERPL-MCNC: 0.1 MG/DL (ref 0.2–1)
BILIRUB UR QL: NEGATIVE
BUN SERPL-MCNC: 12 MG/DL (ref 6–20)
BUN/CREAT SERPL: 14 (ref 12–20)
CALCIUM SERPL-MCNC: 10 MG/DL (ref 8.5–10.1)
CANNABINOIDS UR QL SCN: NEGATIVE
CHLORIDE SERPL-SCNC: 105 MMOL/L (ref 97–108)
CO2 SERPL-SCNC: 24 MMOL/L (ref 21–32)
COCAINE UR QL SCN: NEGATIVE
COLOR UR: NORMAL
COMMENT, HOLDF: NORMAL
CREAT SERPL-MCNC: 0.87 MG/DL (ref 0.55–1.02)
DIFFERENTIAL METHOD BLD: ABNORMAL
DRUG SCRN COMMENT,DRGCM: NORMAL
EOSINOPHIL # BLD: 0 K/UL (ref 0–0.4)
EOSINOPHIL NFR BLD: 0 % (ref 0–7)
ERYTHROCYTE [DISTWIDTH] IN BLOOD BY AUTOMATED COUNT: 15.1 % (ref 11.5–14.5)
GLOBULIN SER CALC-MCNC: 4.4 G/DL (ref 2–4)
GLUCOSE SERPL-MCNC: 92 MG/DL (ref 65–100)
GLUCOSE UR STRIP.AUTO-MCNC: NEGATIVE MG/DL
HCT VFR BLD AUTO: 44.7 % (ref 35–47)
HGB BLD-MCNC: 13.8 G/DL (ref 11.5–16)
HGB UR QL STRIP: NEGATIVE
IMM GRANULOCYTES # BLD AUTO: 0.1 K/UL (ref 0–0.04)
IMM GRANULOCYTES NFR BLD AUTO: 1 % (ref 0–0.5)
INR PPP: 1 (ref 0.9–1.1)
KETONES UR QL STRIP.AUTO: NEGATIVE MG/DL
LEUKOCYTE ESTERASE UR QL STRIP.AUTO: NEGATIVE
LYMPHOCYTES # BLD: 2 K/UL (ref 0.8–3.5)
LYMPHOCYTES NFR BLD: 17 % (ref 12–49)
MCH RBC QN AUTO: 25.9 PG (ref 26–34)
MCHC RBC AUTO-ENTMCNC: 30.9 G/DL (ref 30–36.5)
MCV RBC AUTO: 84 FL (ref 80–99)
METHADONE UR QL: NEGATIVE
MONOCYTES # BLD: 0.8 K/UL (ref 0–1)
MONOCYTES NFR BLD: 7 % (ref 5–13)
NEUTS SEG # BLD: 8.8 K/UL (ref 1.8–8)
NEUTS SEG NFR BLD: 74 % (ref 32–75)
NITRITE UR QL STRIP.AUTO: NEGATIVE
NRBC # BLD: 0 K/UL (ref 0–0.01)
NRBC BLD-RTO: 0 PER 100 WBC
OPIATES UR QL: NEGATIVE
PCP UR QL: NEGATIVE
PH UR STRIP: 7 [PH] (ref 5–8)
PLATELET # BLD AUTO: 273 K/UL (ref 150–400)
PMV BLD AUTO: 12.6 FL (ref 8.9–12.9)
POTASSIUM SERPL-SCNC: 3.8 MMOL/L (ref 3.5–5.1)
PROT SERPL-MCNC: 8.2 G/DL (ref 6.4–8.2)
PROT UR STRIP-MCNC: NEGATIVE MG/DL
PROTHROMBIN TIME: 10.3 SEC (ref 9–11.1)
RBC # BLD AUTO: 5.32 M/UL (ref 3.8–5.2)
SAMPLES BEING HELD,HOLD: NORMAL
SODIUM SERPL-SCNC: 136 MMOL/L (ref 136–145)
SP GR UR REFRACTOMETRY: 1.01 (ref 1–1.03)
THERAPEUTIC RANGE,PTTT: NORMAL SECS (ref 58–77)
UROBILINOGEN UR QL STRIP.AUTO: 0.2 EU/DL (ref 0.2–1)
WBC # BLD AUTO: 11.8 K/UL (ref 3.6–11)

## 2021-12-23 PROCEDURE — 85025 COMPLETE CBC W/AUTO DIFF WBC: CPT

## 2021-12-23 PROCEDURE — 80307 DRUG TEST PRSMV CHEM ANLYZR: CPT

## 2021-12-23 PROCEDURE — 70551 MRI BRAIN STEM W/O DYE: CPT

## 2021-12-23 PROCEDURE — 80053 COMPREHEN METABOLIC PANEL: CPT

## 2021-12-23 PROCEDURE — G0378 HOSPITAL OBSERVATION PER HR: HCPCS

## 2021-12-23 PROCEDURE — 99285 EMERGENCY DEPT VISIT HI MDM: CPT

## 2021-12-23 PROCEDURE — 85610 PROTHROMBIN TIME: CPT

## 2021-12-23 PROCEDURE — 74011250637 HC RX REV CODE- 250/637: Performed by: HOSPITALIST

## 2021-12-23 PROCEDURE — 93005 ELECTROCARDIOGRAM TRACING: CPT

## 2021-12-23 PROCEDURE — 85730 THROMBOPLASTIN TIME PARTIAL: CPT

## 2021-12-23 PROCEDURE — 81003 URINALYSIS AUTO W/O SCOPE: CPT

## 2021-12-23 PROCEDURE — 70450 CT HEAD/BRAIN W/O DYE: CPT

## 2021-12-23 PROCEDURE — 74011250637 HC RX REV CODE- 250/637: Performed by: EMERGENCY MEDICINE

## 2021-12-23 PROCEDURE — 71045 X-RAY EXAM CHEST 1 VIEW: CPT

## 2021-12-23 PROCEDURE — 74011250636 HC RX REV CODE- 250/636: Performed by: HOSPITALIST

## 2021-12-23 RX ORDER — GABAPENTIN 600 MG/1
600 TABLET ORAL 3 TIMES DAILY
Status: DISCONTINUED | OUTPATIENT
Start: 2021-12-23 | End: 2021-12-28 | Stop reason: HOSPADM

## 2021-12-23 RX ORDER — METOPROLOL TARTRATE 25 MG/1
25 TABLET, FILM COATED ORAL 2 TIMES DAILY
Status: DISCONTINUED | OUTPATIENT
Start: 2021-12-23 | End: 2021-12-28 | Stop reason: HOSPADM

## 2021-12-23 RX ORDER — ACETAMINOPHEN 650 MG/1
650 SUPPOSITORY RECTAL
Status: DISCONTINUED | OUTPATIENT
Start: 2021-12-23 | End: 2021-12-28 | Stop reason: HOSPADM

## 2021-12-23 RX ORDER — TRAMADOL HYDROCHLORIDE 50 MG/1
50 TABLET ORAL
Status: DISCONTINUED | OUTPATIENT
Start: 2021-12-23 | End: 2021-12-24

## 2021-12-23 RX ORDER — FAMOTIDINE 20 MG/1
20 TABLET, FILM COATED ORAL 2 TIMES DAILY
Status: DISCONTINUED | OUTPATIENT
Start: 2021-12-23 | End: 2021-12-28 | Stop reason: HOSPADM

## 2021-12-23 RX ORDER — ACETAMINOPHEN 325 MG/1
650 TABLET ORAL
Status: DISCONTINUED | OUTPATIENT
Start: 2021-12-23 | End: 2021-12-28 | Stop reason: HOSPADM

## 2021-12-23 RX ORDER — GUAIFENESIN 100 MG/5ML
81 LIQUID (ML) ORAL DAILY
Status: DISCONTINUED | OUTPATIENT
Start: 2021-12-24 | End: 2021-12-23

## 2021-12-23 RX ORDER — SUMATRIPTAN 25 MG/1
100 TABLET, FILM COATED ORAL
Status: DISCONTINUED | OUTPATIENT
Start: 2021-12-23 | End: 2021-12-24

## 2021-12-23 RX ORDER — CLOPIDOGREL 300 MG/1
600 TABLET, FILM COATED ORAL ONCE
Status: COMPLETED | OUTPATIENT
Start: 2021-12-23 | End: 2021-12-23

## 2021-12-23 RX ORDER — TOPIRAMATE 25 MG/1
50 TABLET ORAL DAILY
Status: DISCONTINUED | OUTPATIENT
Start: 2021-12-24 | End: 2021-12-24

## 2021-12-23 RX ORDER — ASPIRIN 325 MG
325 TABLET ORAL ONCE
Status: COMPLETED | OUTPATIENT
Start: 2021-12-23 | End: 2021-12-23

## 2021-12-23 RX ORDER — FLUOXETINE HYDROCHLORIDE 20 MG/1
20 CAPSULE ORAL DAILY
Status: DISCONTINUED | OUTPATIENT
Start: 2021-12-24 | End: 2021-12-28 | Stop reason: HOSPADM

## 2021-12-23 RX ORDER — BACLOFEN 10 MG/1
10 TABLET ORAL 3 TIMES DAILY
Status: DISCONTINUED | OUTPATIENT
Start: 2021-12-23 | End: 2021-12-28 | Stop reason: HOSPADM

## 2021-12-23 RX ORDER — SODIUM CHLORIDE 9 MG/ML
75 INJECTION, SOLUTION INTRAVENOUS CONTINUOUS
Status: DISPENSED | OUTPATIENT
Start: 2021-12-23 | End: 2021-12-24

## 2021-12-23 RX ORDER — ALPRAZOLAM 0.5 MG/1
0.5 TABLET ORAL
Status: DISCONTINUED | OUTPATIENT
Start: 2021-12-23 | End: 2021-12-28 | Stop reason: HOSPADM

## 2021-12-23 RX ADMIN — METOPROLOL TARTRATE 25 MG: 25 TABLET, FILM COATED ORAL at 22:18

## 2021-12-23 RX ADMIN — CLOPIDOGREL BISULFATE 600 MG: 300 TABLET, FILM COATED ORAL at 17:33

## 2021-12-23 RX ADMIN — GABAPENTIN 600 MG: 600 TABLET, FILM COATED ORAL at 22:18

## 2021-12-23 RX ADMIN — FAMOTIDINE 20 MG: 20 TABLET ORAL at 22:18

## 2021-12-23 RX ADMIN — BACLOFEN 10 MG: 10 TABLET ORAL at 22:18

## 2021-12-23 RX ADMIN — ACETAMINOPHEN 650 MG: 325 TABLET ORAL at 21:45

## 2021-12-23 RX ADMIN — SODIUM CHLORIDE 75 ML/HR: 9 INJECTION, SOLUTION INTRAVENOUS at 21:45

## 2021-12-23 RX ADMIN — ASPIRIN 325 MG ORAL TABLET 325 MG: 325 PILL ORAL at 17:33

## 2021-12-23 NOTE — H&P
History & Physical    Primary Care Provider: Jaye Talamantes NP  Source of Information: Patient     Please note that this dictation was completed with [employer], the computer voice recognition software. Quite often unanticipated grammatical, syntax, homophones, and other interpretive errors are inadvertently transcribed by the computer software. Please disregard these errors. Please excuse any errors that have escaped final proofreading. History of Presenting Illness:   Deborah Noriega is a 52 y.o. female who presents with speech difficulty. Patient has a previous history of cerebral palsy hypertension and migraine headache. Patient told me that around 12 noon she was with some people and developed headache on both side of her head which is bandlike and seems like different from her usual migraine headaches. She had also some difficulty in finding words at that time some numbness and tingling of the left hand and feet which are still persisting but the speech has been good at this time in the ED. she also has baseline weakness in her left side denies any visual symptoms she is feeling better but her head is a still hurting patient has no history of diabetes high blood pressure patient was given aspirin 325 in the ED a CT scan head was negative. Patient was admitted for further management     The patient denies any fever, chills, chest pain, cough, congestion, recent illness, palpitations, or dysuria. Review of Systems:  Pertinent items are noted in the History of Present Illness.      Past Medical History:   Diagnosis Date    Asthma 4/29/2010    Bronchitis     CAD (coronary artery disease)     Sinus Tach    Cerebral palsy ([address] 4/29/2010    Cerebral palsy ([address]     CP (cerebral palsy) ([address]     Endocrine disease     Thyroid goiter    Goiter     Hypertension     Pt denies hypertension    Ill-defined condition     Plantar fascitis and heel spur    Lumbar disc herniation 4/29/2010    Migraine     Neurological disorder     cerebral palsy    Other ill-defined conditions(799.89)     pleurisy    Other ill-defined conditions(799.89)     Migraines    Psychiatric disorder     anxiety    Sinus tachycardia 5/23/2014    Thyroid disease       Past Surgical History:   Procedure Laterality Date    HX HEENT      T & A    HX HYSTERECTOMY      HX ORTHOPAEDIC      carpal tunnel release    HX ORTHOPAEDIC      tendon repair R hand    HX ORTHOPAEDIC      pin in toe of R foot    HX TUBAL LIGATION      HI LAP,TUBAL CAUTERY       Prior to Admission medications    Medication Sig Start Date End Date Taking? Authorizing Provider   azithromycin (ZITHROMAX Z-ANGIE) 250 mg tablet Take two tablets today then one tablet daily 12/13/18   Montez Rivas MD   dextromethorphan-guaiFENesin (ROBITUSSIN-DM)  mg/5 mL syrup Take 10 mL by mouth every six (6) hours as needed for Cough. 8/9/18   Zachariah Gustafson NP   traMADol Chris Gerber) 50 mg tablet Take 1 Tab by mouth every six (6) hours as needed for Pain. Max Daily Amount: 200 mg. Indications: Pain 7/6/18   Montez Rivas MD   ondansetron (ZOFRAN ODT) 4 mg disintegrating tablet Take 1 Tab by mouth every eight (8) hours as needed for Nausea. 7/6/18   Montez Rivas MD   acetaminophen (TYLENOL) 325 mg tablet Take 2 Tabs by mouth every four (4) hours as needed for Pain. 2/18/18   ESVIN Ahumada   ibuprofen (MOTRIN) 600 mg tablet Take 1 Tab by mouth every six (6) hours as needed for Pain. 2/18/18   ESVIN Ahumada   multivitamin (ONE A DAY) tablet Take 1 Tab by mouth daily. Claire Ngo MD   butalbital-acetaminophen-caff (FIORICET) -40 mg per capsule Take 1 Cap by mouth every four (4) hours as needed for Headache. Max Daily Amount: 6 Caps. 10/23/17   Eliseo Santamaria PA-C   fluticasone (FLONASE) 50 mcg/actuation nasal spray 2 Sprays by Both Nostrils route daily.  6/2/17   Cary Peña MD montelukast (SINGULAIR) 10 mg tablet Take 1 Tab by mouth daily. 6/2/17   Jose Luis Brush MD   FLUoxetine (PROZAC) 20 mg tablet Take 1 Tab by mouth daily. 6/2/17   Chelle Robertson MD   albuterol (PROVENTIL VENTOLIN) 2.5 mg /3 mL (0.083 %) nebulizer solution 3 mL by Nebulization route every four (4) hours as needed for Wheezing. 2/21/17   Chelle Robertson MD   gabapentin (NEURONTIN) 600 mg tablet Take 1 Tab by mouth three (3) times daily. 2/21/17   Jose Luis Brush MD   baclofen (LIORESAL) 10 mg tablet Take 1 Tab by mouth three (3) times daily. 2/21/17   Jose Luis Brush MD   metoprolol tartrate (LOPRESSOR) 25 mg tablet Take 1 Tab by mouth two (2) times a day. 2/8/17   Chelle Robertson MD   SUMAtriptan (IMITREX) 100 mg tablet Take 1 Tab by mouth once as needed for Migraine. 1/17/17   Jose Luis Brush MD   topiramate (TOPAMAX) 50 mg tablet Take 1 Tab by mouth daily. 1/17/17   Chelle Robertson MD   albuterol sulfate (PROAIR RESPICLICK) 90 mcg/actuation aepb Take 2 Puffs by inhalation every four (4) hours. 1/17/17   Jose Luis Brush MD   Nebulizer & Compressor machine Mask and tubing 1/17/17   Chelle Robertson MD   albuterol (PROVENTIL HFA, VENTOLIN HFA, PROAIR HFA) 90 mcg/actuation inhaler Take 1-2 Puffs by inhalation every four (4) hours as needed for Wheezing. 1/12/17   Diego Grant MD   ranitidine (ZANTAC) 150 mg tablet Take 1 Tab by mouth two (2) times a day. 3/9/15   Jose Luis Brush MD   Ferrous Sulfate (FLOW FE) 47.5 mg iron TbER tablet Take 1 Tab by mouth daily. Take with vitamin C 2/23/15   Chelle Robertson MD   ALPRAZolam Aldon Nones) 0.5 mg tablet Take 1 tablet by mouth daily as needed for Anxiety.  1/13/15   Jose Luis Brush MD     Allergies   Allergen Reactions    Pcn [Penicillins] Hives     Swelling in hands       Family History   Problem Relation Age of Onset   [employer] Asthma Mother     Hypertension Mother     Diabetes Mother    Bruce Stoner Father         prostate    Asthma Daughter     Asthma Daughter     Stroke Maternal Grandfather     Cancer Paternal Grandmother         throat        SOCIAL HISTORY:  Patient resides:  Independently x   Assisted Living    SNF    With family care       Smoking history:   None x   Former    Chronic      Alcohol history:   None x   Social    Chronic      Ambulates:   Independently x   w/cane    w/walker    w/wc    CODE STATUS:  DNR    Full x   Other      Objective:     Physical Exam:     Visit Vitals  /85   Pulse 99   Temp 98.4 °F (36.9 °C)   Resp 24   LMP 02/20/2015   SpO2 99%           General:  Alert, cooperative, no distress, appears stated age. Head:  Normocephalic, without obvious abnormality, atraumatic. Eyes:  Conjunctivae/corneas clear. PERRL, EOMs intact. Nose: Nares normal. Septum midline. Mucosa normal. No drainage or sinus tenderness. Throat: Lips, mucosa, and tongue normal. Teeth and gums normal.   Neck: Supple, symmetrical, trachea midline, no adenopathy, thyroid: no enlargement/tenderness/nodules, no carotid bruit and no JVD. Back:   Symmetric, no curvature. ROM normal. No CVA tenderness. Lungs:   Clear to auscultation bilaterally. Chest wall:  No tenderness or deformity. Heart:  Regular rate and rhythm, S1, S2 normal, no murmur, click, rub or gallop. Abdomen:   Soft, non-tender. Bowel sounds normal. No masses,  No organomegaly. Extremities: Extremities normal, atraumatic, no cyanosis or edema. Pulses: 2+ and symmetric all extremities. Skin: Skin color, texture, turgor normal. No rashes or lesions   Neurologic: CNII-XII intact. Strength 4 x 5 upper and lower extremity come on the left compared to the right side speech fluent         EKG:  normal sinus rhythm.       Data Review:     Recent Days:  Recent Labs     12/23/21  1456   WBC 11.8*   HGB 13.8   HCT 44.7        Recent Labs     12/23/21  1456      K 3.8      CO2 24   GLU 92   BUN 12   CREA 0.87   CA 10.0   ALB 3.8   TBILI 0.1*   ALT 40   INR 1.0     No results for input(s): PH, PCO2, PO2, HCO3, FIO2 in the last 72 hours. 24 Hour Results:  Recent Results (from the past 24 hour(s))   SAMPLES BEING HELD    Collection Time: 12/23/21  2:53 PM   Result Value Ref Range    SAMPLES BEING HELD 1red,1blu,1lav,1pst,1ua,1uc     COMMENT        Add-on orders for these samples will be processed based on acceptable specimen integrity and analyte stability, which may vary by analyte. URINALYSIS W/ RFLX MICROSCOPIC    Collection Time: 12/23/21  2:53 PM   Result Value Ref Range    Color YELLOW/STRAW      Appearance CLEAR CLEAR      Specific gravity 1.008 1.003 - 1.030      pH (UA) 7.0 5.0 - 8.0      Protein Negative NEG mg/dL    Glucose Negative NEG mg/dL    Ketone Negative NEG mg/dL    Bilirubin Negative NEG      Blood Negative NEG      Urobilinogen 0.2 0.2 - 1.0 EU/dL    Nitrites Negative NEG      Leukocyte Esterase Negative NEG     CBC WITH AUTOMATED DIFF    Collection Time: 12/23/21  2:56 PM   Result Value Ref Range    WBC 11.8 (H) 3.6 - 11.0 K/uL    RBC 5.32 (H) 3.80 - 5.20 M/uL    HGB 13.8 11.5 - 16.0 g/dL    HCT 44.7 35.0 - 47.0 %    MCV 84.0 80.0 - 99.0 FL    MCH 25.9 (L) 26.0 - 34.0 PG    MCHC 30.9 30.0 - 36.5 g/dL    RDW 15.1 (H) 11.5 - 14.5 %    PLATELET 586 468 - 090 K/uL    MPV 12.6 8.9 - 12.9 FL    NRBC 0.0 0  WBC    ABSOLUTE NRBC 0.00 0.00 - 0.01 K/uL    NEUTROPHILS 74 32 - 75 %    LYMPHOCYTES 17 12 - 49 %    MONOCYTES 7 5 - 13 %    EOSINOPHILS 0 0 - 7 %    BASOPHILS 1 0 - 1 %    IMMATURE GRANULOCYTES 1 (H) 0.0 - 0.5 %    ABS. NEUTROPHILS 8.8 (H) 1.8 - 8.0 K/UL    ABS. LYMPHOCYTES 2.0 0.8 - 3.5 K/UL    ABS. MONOCYTES 0.8 0.0 - 1.0 K/UL    ABS. EOSINOPHILS 0.0 0.0 - 0.4 K/UL    ABS. BASOPHILS 0.1 0.0 - 0.1 K/UL    ABS. IMM.  GRANS. 0.1 (H) 0.00 - 0.04 K/UL    DF AUTOMATED     METABOLIC PANEL, COMPREHENSIVE    Collection Time: 12/23/21  2:56 PM   Result Value Ref Range    Sodium 136 136 - 145 mmol/L    Potassium 3.8 3.5 - 5.1 mmol/L    Chloride 105 97 - 108 mmol/L    CO2 24 21 - 32 mmol/L    Anion gap 7 5 - 15 mmol/L    Glucose 92 65 - 100 mg/dL    BUN 12 6 - 20 MG/DL    Creatinine 0.87 0.55 - 1.02 MG/DL    BUN/Creatinine ratio 14 12 - 20      GFR est AA >60 >60 ml/min/1.73m2    GFR est non-AA >60 >60 ml/min/1.73m2    Calcium 10.0 8.5 - 10.1 MG/DL    Bilirubin, total 0.1 (L) 0.2 - 1.0 MG/DL    ALT (SGPT) 40 12 - 78 U/L    AST (SGOT) 10 (L) 15 - 37 U/L    Alk. phosphatase 111 45 - 117 U/L    Protein, total 8.2 6.4 - 8.2 g/dL    Albumin 3.8 3.5 - 5.0 g/dL    Globulin 4.4 (H) 2.0 - 4.0 g/dL    A-G Ratio 0.9 (L) 1.1 - 2.2     PTT    Collection Time: 12/23/21  2:56 PM   Result Value Ref Range    aPTT 30.2 22.1 - 31.0 sec    aPTT, therapeutic range     58.0 - 77.0 SECS   PROTHROMBIN TIME + INR    Collection Time: 12/23/21  2:56 PM   Result Value Ref Range    INR 1.0 0.9 - 1.1      Prothrombin time 10.3 9.0 - 11.1 sec         Imaging:     XR CHEST PORT    Result Date: 12/23/2021  No acute cardiopulmonary process    CT CODE NEURO HEAD WO CONTRAST    Result Date: 12/23/2021  No acute findings. Admit to inpatient status      Patient was explained about the risk of admission including and not a complete list including risk of falls,fractures,blood clots,allergic reactions,infections. Patient/family also understands and agrees to the treatment plan including medications and side effect profiles and also understand the risk with radiation while undergoing imaging studies. The patient and the family/friends (after permission given by the patient to discuss) understand this and agree with the admission plan.         Assessment:     Principal Problem:    Dysarthria (12/23/2021)    Active Problems:    TIA (transient ischemic attack) (12/23/2021)           Plan:     TIA POA versus migraine  --Admit patient to neuro telemetry neurochecks every 4 hours  --Aspirin 325 given in the ED with 75 of Plavix continue statin  --Blood pressure was stable at this time permissive hypertension  --Neurology consult will get MRI  --Routine SLP evaluation although patient seems have no problem with swallowing  --Lipid panel A1c per routine  --For migraine headache continue Topamax and triptan's  --PT OT evaluation tomorrow  --We will get MRI and neurology consult full code further management as per clinical course admitted for observation overnight    --Full code ambulates at baseline DVT prophylaxis SCDs       Signed By: Devonte Hinson MD     December 23, 2021

## 2021-12-23 NOTE — ED TRIAGE NOTES
Pt arrived from work via EMS with CC left sided weakness and facial numbness, dizzy/lightheadedness and palpitations. Last known normal 11:15 AM today. Pt reported her words were jumbled. Speech improved. Pt stated she feels better now minus feeling weak. PMH: Migraines, cerebral palsy.

## 2021-12-23 NOTE — ED PROVIDER NOTES
Is a 17-year-old female with a history of cerebral palsy, hypertension and migraine headache. She says around 1115 this morning when she was with some people and developed headache on both sides of her head which is different from her usual migraine headaches. She also developed some difficulty with speaking finding the right words, some numbness and tingling and weakness in the left upper extremity proximally, and some weakness in the left lower extremity. She said it is hard to distinguish between new weakness and her baseline weakness in the left leg. She denied any visual symptoms. She had had no other acute symptoms leading up to this event. At this time she is feeling a little better. Her head is still hurting her bilaterally. She still has slight tingling in her left upper arm and some weakness in her left leg. She currently denies any other acute symptomatology.            Past Medical History:   Diagnosis Date    Asthma 4/29/2010    Bronchitis     CAD (coronary artery disease)     Sinus Tach    Cerebral palsy ([address] 4/29/2010    Cerebral palsy (HCC)     CP (cerebral palsy) (HCC)     Endocrine disease     Thyroid goiter    Goiter     Hypertension     Pt denies hypertension    Ill-defined condition     Plantar fascitis and heel spur    Lumbar disc herniation 4/29/2010    Migraine     Neurological disorder     cerebral palsy    Other ill-defined conditions(799.89)     pleurisy    Other ill-defined conditions(799.89)     Migraines    Psychiatric disorder     anxiety    Sinus tachycardia 5/23/2014    Thyroid disease        Past Surgical History:   Procedure Laterality Date    HX HEENT      T & A    HX HYSTERECTOMY      HX ORTHOPAEDIC      carpal tunnel release    HX ORTHOPAEDIC      tendon repair R hand    HX ORTHOPAEDIC      pin in toe of R foot    HX TUBAL LIGATION      LA LAP,TUBAL CAUTERY           Family History:   Problem Relation Age of Onset    Asthma Mother    Nate Montero Hypertension Mother     Diabetes Mother     Cancer Father         prostate    Asthma Daughter     Asthma Daughter     Stroke Maternal Grandfather     Cancer Paternal Grandmother         throat       Social History     Socioeconomic History    Marital status: LEGALLY      Spouse name: Not on file    Number of children: Not on file    Years of education: Not on file    Highest education level: Not on file   Occupational History    Not on file   Tobacco Use    Smoking status: Never Smoker    Smokeless tobacco: Never Used   Substance and Sexual Activity    Alcohol use: Yes     Comment: social    Drug use: No    Sexual activity: Yes     Partners: Male     Birth control/protection: Surgical   Other Topics Concern    Not on file   Social History Narrative    Not on file     Social Determinants of Health     Financial Resource Strain:     Difficulty of Paying Living Expenses: Not on file   Food Insecurity:     Worried About Running Out of Food in the Last Year: Not on file    Francis of Food in the Last Year: Not on file   Transportation Needs:     Lack of Transportation (Medical): Not on file    Lack of Transportation (Non-Medical):  Not on file   Physical Activity:     Days of Exercise per Week: Not on file    Minutes of Exercise per Session: Not on file   Stress:     Feeling of Stress : Not on file   Social Connections:     Frequency of Communication with Friends and Family: Not on file    Frequency of Social Gatherings with Friends and Family: Not on file    Attends Restorationism Services: Not on file    Active Member of Clubs or Organizations: Not on file    Attends Club or Organization Meetings: Not on file    Marital Status: Not on file   Intimate Partner Violence:     Fear of Current or Ex-Partner: Not on file    Emotionally Abused: Not on file    Physically Abused: Not on file    Sexually Abused: Not on file   Housing Stability:     Unable to Pay for Housing in the Last Year: Not on file    Number of Places Lived in the Last Year: Not on file    Unstable Housing in the Last Year: Not on file         ALLERGIES: Pcn [penicillins]    Review of Systems   Constitutional: Negative for activity change, appetite change and fatigue. HENT: Negative for ear pain, facial swelling, sore throat and trouble swallowing. Eyes: Negative for pain, discharge and visual disturbance. Respiratory: Negative for chest tightness, shortness of breath and wheezing. Cardiovascular: Negative for chest pain and palpitations. Gastrointestinal: Negative for abdominal pain, blood in stool, nausea and vomiting. Genitourinary: Negative for difficulty urinating, flank pain and hematuria. Musculoskeletal: Negative for arthralgias, joint swelling, myalgias and neck pain. Skin: Negative for color change and rash. Neurological: Positive for speech difficulty, weakness, light-headedness and numbness ( Left face, left upper arm.). Negative for dizziness and headaches. Hematological: Negative for adenopathy. Does not bruise/bleed easily. Psychiatric/Behavioral: Negative for behavioral problems, confusion and sleep disturbance. All other systems reviewed and are negative. There were no vitals filed for this visit. Physical Exam  Vitals and nursing note reviewed. Constitutional:       General: She is not in acute distress. Appearance: Normal appearance. She is well-developed. She is not ill-appearing. Comments: This is a 73-year-old female who is awake and alert. She has vital signs as noted. She is speaking clearly. HENT:      Head: Normocephalic and atraumatic. Nose: Nose normal.   Eyes:      General: No scleral icterus. Conjunctiva/sclera: Conjunctivae normal.      Pupils: Pupils are equal, round, and reactive to light. Neck:      Thyroid: No thyromegaly. Vascular: No JVD. Trachea: No tracheal deviation.       Comments: No carotid bruits noted. Cardiovascular:      Rate and Rhythm: Normal rate and regular rhythm. Heart sounds: Normal heart sounds. No murmur heard. No friction rub. No gallop. Pulmonary:      Effort: Pulmonary effort is normal. No respiratory distress. Breath sounds: Normal breath sounds. No wheezing or rales. Chest:      Chest wall: No tenderness. Abdominal:      General: Bowel sounds are normal. There is no distension. Palpations: Abdomen is soft. There is no mass. Tenderness: There is no abdominal tenderness. There is no guarding or rebound. Musculoskeletal:         General: No tenderness. Normal range of motion. Cervical back: Normal range of motion and neck supple. Lymphadenopathy:      Cervical: No cervical adenopathy. Skin:     General: Skin is warm and dry. Capillary Refill: Capillary refill takes 2 to 3 seconds. Findings: No erythema or rash. Neurological:      General: No focal deficit present. Mental Status: She is alert and oriented to person, place, and time. Cranial Nerves: No cranial nerve deficit. Sensory: Sensory deficit present. Motor: Weakness present. Coordination: Coordination normal.      Deep Tendon Reflexes: Reflexes are normal and symmetric. Comments: Patient has some weakness in the left leg. There is underlying chronic weakness and the patient is unable to tell how much of the weakness she has currently is moved. She had no focal findings of weakness in her left upper extremity or in her face. She did have some mild numbness in the left upper arm. There is no weakness of . There is no arm drift noted. She is alert and oriented x3 without any other focal findings. Psychiatric:         Behavior: Behavior normal.         Thought Content:  Thought content normal.         Judgment: Judgment normal.          MDM  Number of Diagnoses or Management Options  Dysarthria: new and requires workup  Numbness and tingling: new and requires workup  Weakness: new and requires workup     Amount and/or Complexity of Data Reviewed  Clinical lab tests: ordered and reviewed  Tests in the radiology section of CPT®: ordered and reviewed  Decide to obtain previous medical records or to obtain history from someone other than the patient: yes  Review and summarize past medical records: yes  Discuss the patient with other providers: yes    Risk of Complications, Morbidity, and/or Mortality  Presenting problems: high  Diagnostic procedures: high  Management options: high    Patient Progress  Patient progress: stable         Procedures    68-year-old female who presents with an acute stroke alert level 1 with onset of symptoms at 1115. She was evaluated in the CT scanner and consult was placed with telemetry neurology. They are currently seeing her. Her symptoms are resolving. She has no speech difficulty at this point and she has no significant focal weakness in her upper extremities. There is some weakness to flexion of her left leg. She has chronic baseline weakness in the left leg so it is unclear exactly how much new weakness she has and she is unable to quantify that. Currently awaiting evaluation by telemetry neuro and neuro interventional.  Plain CT of the head was done and appears nonacute by wet read. The patient was seen again by the telemetry neurologist and it was not felt that the patient was a candidate for TPA. She will be admitted to the hospitalist service for further evaluation with MRI, aspirin and Plavix. We will consult the hospitalist for admission. All labs are still pending. Perfect Serve Consult for Admission  5:26 PM    ED Room Number: ER15/15  Patient Name and age:  Caitlin Smith 52 y.o.  female  Working Diagnosis:   1. Weakness    2. Numbness and tingling    3.  Dysarthria        COVID-19 Suspicion:  no  Sepsis present:  no  Reassessment needed: no  Code Status:  Full Code  Readmission: no  Isolation Requirements: no  Recommended Level of Care:  telemetry  Department:[facility] Adult ED - [phone]  Other:

## 2021-12-23 NOTE — PROGRESS NOTES
NIHSS Code Stroke Documentation      Person Administering Scale: Taras Hallman NP    TIME: 14:02 PM    LKW: 10:30 AM    PMH: Depression/anxiety, hypertension, thyroid disease, cerebral palsy, migraine, CAD, and sinus tachycardia    SUBJECTIVE: Cognition difficulties associated with speech abnormalities, dizziness, left sided numbness with weakness. According to the patient, she was in her usual state of health until 1130 she started endorsing lightheaded dizziness which was associated with left facial, arm and leg numbness with weakness. Patient stated she decided to take her blood pressure which was 031B systolically and admits elevated compared to her baseline BP. Patient stated she suddenly became blank, felt confused and head started hurting. Patient denied nausea vomiting photosensitivity or phonophobia associated with symptoms however, patient stated she has had these symptoms with her usual migraine. Patient stated she did had nausea vomiting yesterday but not today. The patient was evaluated by tele neurologist. The patient symptoms has started to rapidly improved by the time she was evalutated by tele neurologist with low NIH of 2. The patient was not a tPA candidate. CT head without contrast showed no acute intracranial normalities. The patient will be admitted to rule out stroke given patient's history of high blood pressure. 1a  Level of consciousness: 0=alert; keenly responsive   1b. LOC questions:  0=Answers both questions correctly   1c. LOC commands: 0=Performs both tasks correctly   2. Best Gaze: 0=normal   3. Visual: 0=No visual loss   4. Facial Palsy: 1=Minor paralysis (flattened nasolabial fold, asymmetric on smiling)   5a. Motor left arm: 0=No drift, arm holds 90 (or 45) degrees for full 10 seconds   5b. Motor right arm: 0=No drift, arm holds 90 (or 45) degrees for full 10 seconds   6a. Motor left le=No drift; leg holds 30-degree position for full 5 seconds.    6b  Motor right leg: 0=No drift; leg holds 30-degree position for full 5 seconds. 7. Limb Ataxia: 0=Absent   8. Sensory: 1=Mild to moderate sensory loss; patient feels pinprick is less sharp or is dull on the affected side; or there is a loss of superficial pain with pinprick but patient is aware of being touched. 9. Best Language:  0=No aphasia, normal   10. Dysarthria: 0=Normal   11. Extinction and Inattention: 0=No abnormality    Total:    2     VAN: NEGATIVE    tPA Candidate: NO    Mechanical Thrombectomy Candidate: NO    ANTIPLT/AC/ANTITHROMB: NO    CT Results (most recent):  Results from Hospital Encounter encounter on 12/23/21    CT CODE NEURO HEAD WO CONTRAST    Narrative  EXAM: CT CODE NEURO HEAD WO CONTRAST    INDICATION: left sided numbness    COMPARISON: None. CONTRAST: None. TECHNIQUE: Unenhanced CT of the head was performed using 5 mm images. Brain and  bone windows were generated. Coronal and sagittal reformats. CT dose reduction  was achieved through use of a standardized protocol tailored for this  examination and automatic exposure control for dose modulation. FINDINGS:  The ventricles and sulci are normal in size, shape and configuration. . There is  no significant white matter disease. There is no intracranial hemorrhage,  extra-axial collection, or mass effect. The basilar cisterns are open. No CT  evidence of acute infarct. The bone windows demonstrate no abnormalities. The visualized portions of the  paranasal sinuses and mastoid air cells are clear. Impression  No acute findings. Discussed with: Dr. Sekou Montilla ED physician, patient, tele-neurologist Dr. Sylwia Chahal, and ED nurse    Time spent: 35 minutes. Master Ortega NP  Neurocritical Care Nurse Practitioner  [phone]    Please note that this dictation was completed with [employer] the [employer] voice recognition software.   Quite often unanticipated grammatical, syntax, homophones, and other interpretive errors are inadvertently transcribed by the computer software. Please excuse any errors that have escaped final proofreading.

## 2021-12-24 LAB
ATRIAL RATE: 69 BPM
ATRIAL RATE: 86 BPM
CALCULATED P AXIS, ECG09: 49 DEGREES
CALCULATED P AXIS, ECG09: 76 DEGREES
CALCULATED R AXIS, ECG10: 17 DEGREES
CALCULATED R AXIS, ECG10: 35 DEGREES
CALCULATED T AXIS, ECG11: 22 DEGREES
CALCULATED T AXIS, ECG11: 30 DEGREES
CHOLEST SERPL-MCNC: 135 MG/DL
DIAGNOSIS, 93000: NORMAL
DIAGNOSIS, 93000: NORMAL
EST. AVERAGE GLUCOSE BLD GHB EST-MCNC: 105 MG/DL
HBA1C MFR BLD: 5.3 % (ref 4–5.6)
HDLC SERPL-MCNC: 54 MG/DL
HDLC SERPL: 2.5 {RATIO} (ref 0–5)
LDLC SERPL CALC-MCNC: 51.4 MG/DL (ref 0–100)
P-R INTERVAL, ECG05: 130 MS
P-R INTERVAL, ECG05: 136 MS
Q-T INTERVAL, ECG07: 338 MS
Q-T INTERVAL, ECG07: 374 MS
QRS DURATION, ECG06: 60 MS
QRS DURATION, ECG06: 66 MS
QTC CALCULATION (BEZET), ECG08: 400 MS
QTC CALCULATION (BEZET), ECG08: 404 MS
TRIGL SERPL-MCNC: 148 MG/DL (ref ?–150)
VENTRICULAR RATE, ECG03: 69 BPM
VENTRICULAR RATE, ECG03: 86 BPM
VLDLC SERPL CALC-MCNC: 29.6 MG/DL

## 2021-12-24 PROCEDURE — 74011250637 HC RX REV CODE- 250/637: Performed by: HOSPITALIST

## 2021-12-24 PROCEDURE — G0378 HOSPITAL OBSERVATION PER HR: HCPCS

## 2021-12-24 PROCEDURE — 97116 GAIT TRAINING THERAPY: CPT

## 2021-12-24 PROCEDURE — 99223 1ST HOSP IP/OBS HIGH 75: CPT | Performed by: PSYCHIATRY & NEUROLOGY

## 2021-12-24 PROCEDURE — 83036 HEMOGLOBIN GLYCOSYLATED A1C: CPT

## 2021-12-24 PROCEDURE — 97535 SELF CARE MNGMENT TRAINING: CPT

## 2021-12-24 PROCEDURE — 74011250636 HC RX REV CODE- 250/636: Performed by: PSYCHIATRY & NEUROLOGY

## 2021-12-24 PROCEDURE — 97162 PT EVAL MOD COMPLEX 30 MIN: CPT

## 2021-12-24 PROCEDURE — 97530 THERAPEUTIC ACTIVITIES: CPT

## 2021-12-24 PROCEDURE — 36415 COLL VENOUS BLD VENIPUNCTURE: CPT

## 2021-12-24 PROCEDURE — 74011250637 HC RX REV CODE- 250/637: Performed by: PSYCHIATRY & NEUROLOGY

## 2021-12-24 PROCEDURE — 96375 TX/PRO/DX INJ NEW DRUG ADDON: CPT

## 2021-12-24 PROCEDURE — 97165 OT EVAL LOW COMPLEX 30 MIN: CPT

## 2021-12-24 PROCEDURE — 96374 THER/PROPH/DIAG INJ IV PUSH: CPT

## 2021-12-24 PROCEDURE — 80061 LIPID PANEL: CPT

## 2021-12-24 RX ORDER — TOPIRAMATE 25 MG/1
50 TABLET ORAL 2 TIMES DAILY
Status: DISCONTINUED | OUTPATIENT
Start: 2021-12-24 | End: 2021-12-28 | Stop reason: HOSPADM

## 2021-12-24 RX ORDER — MAGNESIUM SULFATE HEPTAHYDRATE 40 MG/ML
2 INJECTION, SOLUTION INTRAVENOUS ONCE
Status: COMPLETED | OUTPATIENT
Start: 2021-12-24 | End: 2021-12-24

## 2021-12-24 RX ORDER — IBUPROFEN 600 MG/1
600 TABLET ORAL
Status: DISCONTINUED | OUTPATIENT
Start: 2021-12-24 | End: 2021-12-28 | Stop reason: HOSPADM

## 2021-12-24 RX ORDER — KETOROLAC TROMETHAMINE 30 MG/ML
30 INJECTION, SOLUTION INTRAMUSCULAR; INTRAVENOUS
Status: COMPLETED | OUTPATIENT
Start: 2021-12-24 | End: 2021-12-24

## 2021-12-24 RX ORDER — DEXAMETHASONE SODIUM PHOSPHATE 10 MG/ML
10 INJECTION INTRAMUSCULAR; INTRAVENOUS ONCE
Status: COMPLETED | OUTPATIENT
Start: 2021-12-24 | End: 2021-12-24

## 2021-12-24 RX ORDER — BUTALBITAL, ACETAMINOPHEN AND CAFFEINE 50; 325; 40 MG/1; MG/1; MG/1
1 TABLET ORAL
Status: DISCONTINUED | OUTPATIENT
Start: 2021-12-24 | End: 2021-12-28 | Stop reason: HOSPADM

## 2021-12-24 RX ADMIN — TOPIRAMATE 50 MG: 25 TABLET, FILM COATED ORAL at 09:28

## 2021-12-24 RX ADMIN — DEXAMETHASONE SODIUM PHOSPHATE 10 MG: 10 INJECTION, SOLUTION INTRAMUSCULAR; INTRAVENOUS at 11:20

## 2021-12-24 RX ADMIN — BACLOFEN 10 MG: 10 TABLET ORAL at 21:27

## 2021-12-24 RX ADMIN — FLUOXETINE 20 MG: 20 CAPSULE ORAL at 09:28

## 2021-12-24 RX ADMIN — METOPROLOL TARTRATE 25 MG: 25 TABLET, FILM COATED ORAL at 17:09

## 2021-12-24 RX ADMIN — FAMOTIDINE 20 MG: 20 TABLET ORAL at 21:27

## 2021-12-24 RX ADMIN — GABAPENTIN 600 MG: 600 TABLET, FILM COATED ORAL at 09:28

## 2021-12-24 RX ADMIN — GABAPENTIN 600 MG: 600 TABLET, FILM COATED ORAL at 16:07

## 2021-12-24 RX ADMIN — TOPIRAMATE 50 MG: 25 TABLET, FILM COATED ORAL at 17:09

## 2021-12-24 RX ADMIN — ACETAMINOPHEN 650 MG: 325 TABLET ORAL at 09:28

## 2021-12-24 RX ADMIN — PROCHLORPERAZINE EDISYLATE 10 MG: 5 INJECTION INTRAMUSCULAR; INTRAVENOUS at 11:19

## 2021-12-24 RX ADMIN — ACETAMINOPHEN 650 MG: 325 TABLET ORAL at 04:30

## 2021-12-24 RX ADMIN — KETOROLAC TROMETHAMINE 30 MG: 30 INJECTION, SOLUTION INTRAMUSCULAR; INTRAVENOUS at 11:18

## 2021-12-24 RX ADMIN — METOPROLOL TARTRATE 25 MG: 25 TABLET, FILM COATED ORAL at 09:28

## 2021-12-24 RX ADMIN — BACLOFEN 10 MG: 10 TABLET ORAL at 16:07

## 2021-12-24 RX ADMIN — FAMOTIDINE 20 MG: 20 TABLET ORAL at 09:28

## 2021-12-24 RX ADMIN — GABAPENTIN 600 MG: 600 TABLET, FILM COATED ORAL at 21:27

## 2021-12-24 RX ADMIN — MAGNESIUM SULFATE HEPTAHYDRATE 2 G: 2 INJECTION, SOLUTION INTRAVENOUS at 11:20

## 2021-12-24 RX ADMIN — BACLOFEN 10 MG: 10 TABLET ORAL at 09:28

## 2021-12-24 NOTE — PROGRESS NOTES
Bedside and Verbal shift change report given to [address] (oncoming nurse) by Katy Colin RN (offgoing nurse). Report included the following information SBAR, ED Summary, MAR, Recent Results, Cardiac Rhythm SR and Dual Neuro Assessment.

## 2021-12-24 NOTE — PROGRESS NOTES
Problem: Self Care Deficits Care Plan (Adult)  Goal: *Acute Goals and Plan of Care (Insert Text)  Description: FUNCTIONAL STATUS PRIOR TO ADMISSION: Patient was independent and active without use of DME. Has a cane but does not use it regularly. HOME SUPPORT: The patient lived alone with significant other and mom available to provide assistance. Occupational Therapy Goals  Initiated 12/24/2021  1. Patient will perform grooming standing for 5 minutes with no LOB with supervision/set-up within 7 day(s). 2.  Patient will perform bathing with supervision/set-up within 7 day(s). 3.  Patient will perform lower body dressing with supervision/set-up within 7 day(s). 4.  Patient will perform toilet transfers with supervision/set-up within 7 day(s). 5.  Patient will perform all aspects of toileting with supervision/set-up within 7 day(s). 6.  Patient will participate in upper extremity therapeutic exercise/activities with supervision/set-up for 5 minutes within 7 day(s). 7.  Patient will utilize energy conservation techniques during functional activities with verbal cues within 7 day(s). Outcome: Not Met    OCCUPATIONAL THERAPY EVALUATION  Patient: Juan J Harper (84 y.o. female)  Date: 12/24/2021  Primary Diagnosis: TIA (transient ischemic attack) [G45.9]       Precautions: falls       ASSESSMENT  Based on the objective data described below, the patient presents with limited ADL performance s/p admission for TIA. Patient imaging negative for acute intercranial process. Patient ADLs limited by impaired balance, generalized weakness and decreased functional activity tolerance. Patient noted to have hx of CP with L sided weakness at baseline. Patient lives in apartment alone but has a significant other and her mother who can assist at needed. Today, patient . Current Level of Function Impacting Discharge (ADLs/self-care): up to min A for ADLs    Functional Outcome Measure:   The patient scored Total: 55/100 on the Barthel Index outcome measure which is indicative of being 45% impairment in basic self-care. Other factors to consider for discharge: lives alone     Patient will benefit from skilled therapy intervention to address the above noted impairments. PLAN :  Recommendations and Planned Interventions: self care training, functional mobility training, therapeutic exercise, balance training, therapeutic activities, endurance activities, patient education, home safety training and family training/education    Frequency/Duration: Patient will be followed by occupational therapy 3 times a week to address goals. Recommendation for discharge: (in order for the patient to meet his/her long term goals)  Occupational therapy at least 2 days/week in the home     This discharge recommendation:  Has not yet been discussed the attending provider and/or case management    IF patient discharges home will need the following DME: patient owns DME required for discharge       SUBJECTIVE:   Patient stated I feel a little dizzy.     OBJECTIVE DATA SUMMARY:   HISTORY:   Past Medical History:   Diagnosis Date    Asthma 4/29/2010    Bronchitis     CAD (coronary artery disease)     Sinus Tach    Cerebral palsy ([facility] [address] 4/29/2010    Cerebral palsy (HCC)     CP (cerebral palsy) (HCC)     Endocrine disease     Thyroid goiter    Goiter     Hypertension     Pt denies hypertension    Ill-defined condition     Plantar fascitis and heel spur    Lumbar disc herniation 4/29/2010    Migraine     Neurological disorder     cerebral palsy    Other ill-defined conditions(799.89)     pleurisy    Other ill-defined conditions(799.89)     Migraines    Psychiatric disorder     anxiety    Sinus tachycardia 5/23/2014    Thyroid disease      Past Surgical History:   Procedure Laterality Date    HX HEENT      T & A    HX HYSTERECTOMY      HX ORTHOPAEDIC      carpal tunnel release    HX ORTHOPAEDIC      tendon repair R hand    HX ORTHOPAEDIC      pin in toe of R foot    HX TUBAL LIGATION      OR LAP,TUBAL CAUTERY         Expanded or extensive additional review of patient history:     Home Situation  Home Environment: Apartment  # Steps to Enter: 0  One/Two Story Residence: One story  Living Alone: Yes  Support Systems: Parent(s),Spouse/Significant Other  Patient Expects to be Discharged to[de-identified] Apartment  Current DME Used/Available at Home: None  Tub or Shower Type: Shower    Hand dominance: Right    EXAMINATION OF PERFORMANCE DEFICITS:  Cognitive/Behavioral Status:  Neurologic State: Alert  Orientation Level: Oriented X4  Cognition: Appropriate decision making; Follows commands  Perception: Appears intact  Perseveration: No perseveration noted  Safety/Judgement: Awareness of environment; Fall prevention; Insight into deficits    Skin: appears intact    Edema: none noted in BUEs    Hearing:       Vision/Perceptual:    Tracking: Requires cues, head turns, or add eye shifts to track                 Diplopia: No    Acuity: Within Defined Limits    Corrective Lenses: Glasses    Range of Motion:  In BUEs  AROM: Generally decreased, functional  PROM: Within functional limits    Strength: In BUEs  Strength: Generally decreased, functional    Coordination:  Coordination: Generally decreased, functional  Fine Motor Skills-Upper: Left Impaired;Right Intact    Gross Motor Skills-Upper: Left Impaired;Right Intact    Tone & Sensation:  In BUEs  Tone: Abnormal  Sensation: Impaired     Balance:  Sitting: With support; Intact  Standing: Impaired; With support  Standing - Static: Constant support; Fair  Standing - Dynamic : Constant support; Fair    Functional Mobility and Transfers for ADLs:  Bed Mobility:  Supine to Sit: Stand-by assistance;Bed Modified  Scooting: Stand-by assistance    Transfers:  Sit to Stand: Contact guard assistance  Stand to Sit: Contact guard assistance  Bed to Chair: Contact guard assistance  Toilet Transfer : Contact guard assistance (Infer)    ADL Assessment:  Feeding: independent  Oral care/grooming:  independent  Bathing:  minimal assistance   Upper body dressing: independent  Lower body dressing: minimal assistance   Toileting: minimal assistance     ADL Intervention and task modifications:  Feeding  Feeding Assistance: Independent  Container Management: Independent  Cutting Food: Independent  Utensil Management: Independent  Food to Mouth: Independent  Drink to Mouth: Independent    Cognitive Retraining  Safety/Judgement: Awareness of environment; Fall prevention; Insight into deficits    Patient educated on role of OT in acute setting and educated patient on importance of completing ADLs as IND as possible - patient verbalized understanding. Functional Measure:    Barthel Index:  Bathin  Bladder: 10  Bowels: 10  Groomin  Dressin  Feeding: 10  Mobility: 5  Stairs: 0  Toilet Use: 5  Transfer (Bed to Chair and Back): 10  Total: 55/100      The Barthel ADL Index: Guidelines  1. The index should be used as a record of what a patient does, not as a record of what a patient could do. 2. The main aim is to establish degree of independence from any help, physical or verbal, however minor and for whatever reason. 3. The need for supervision renders the patient not independent. 4. A patient's performance should be established using the best available evidence. Asking the patient, friends/relatives and nurses are the usual sources, but direct observation and common sense are also important. However direct testing is not needed. 5. Usually the patient's performance over the preceding 24-48 hours is important, but occasionally longer periods will be relevant. 6. Middle categories imply that the patient supplies over 50 per cent of the effort. 7. Use of aids to be independent is allowed.     Score Interpretation (from [address] [place] [address])    Independent   60-79 Minimally independent   40-59 Partially dependent   20-39 Very dependent   <20 Totally dependent     -Wellington Sommer., Barthel, D.W. (1964). Functional evaluation: the Barthel Index. [address] ([address] [facility] [address] (1997). The Barthel activities of daily living index: self-reporting versus actual performance in the old (> or = 75 years). Journal of [address] [place] [address] 45(7), [address] [facility], PAWEL, Sloan Larios., Temi Pedraza. (1999). Measuring the change in disability after inpatient rehabilitation; comparison of the responsiveness of the Barthel Index and Functional [place] Measure. Journal of Neurology, Neurosurgery, and Psychiatry, 66(4), 421-780. Madeline Mercado, N.J.A, LUIS ANGEL Gonzalez, & Didier Garcia M.A. (2004) Assessment of post-stroke quality of life in cost-effectiveness studies: The usefulness of the Barthel Index and the EuroQoL-5D. Quality of Life Research, 15, 841-60     Occupational Therapy Evaluation Charge Determination   History Examination Decision-Making   LOW Complexity : Brief history review  LOW Complexity : 1-3 performance deficits relating to physical, cognitive , or psychosocial skils that result in activity limitations and / or participation restrictions  MEDIUM Complexity : Patient may present with comorbidities that affect occupational performnce. Miniml to moderate modification of tasks or assistance (eg, physical or verbal ) with assesment(s) is necessary to enable patient to complete evaluation       Based on the above components, the patient evaluation is determined to be of the following complexity level: LOW   Pain Rating:  Reporting no pain    Activity Tolerance:   Good    After treatment patient left in no apparent distress:    Sitting in chair, Call bell within reach and RN aware    COMMUNICATION/EDUCATION:   The patients plan of care was discussed with: Physical therapist, Registered nurse and Case management.      Home safety education was provided and the patient/caregiver indicated understanding. and Patient/family have participated as able in goal setting and plan of care. This patients plan of care is appropriate for delegation to [facility].     Thank you for this referral.  Nino Elkins OT  Time Calculation: 31 mins

## 2021-12-24 NOTE — PROGRESS NOTES
SLP Contact Note    SLP consult received and appreciated. Symptoms have resolved. MRI negative for acute infarct. Passed swallow screen. No further SLP needs. Will sign off. Please reconsult should SLP be of any assistance.       Thank you,  EVERT Valdovinos.Ed, [id number] [facility]  Speech-Language Pathologist

## 2021-12-24 NOTE — CONSULTS
NEUROLOGY   INPATIENT EVALUATION/CONSULTATION       PATIENT NAME: Caitlin Smith    MRN: [id number]    REASON FOR CONSULTATION: Headache, left-sided numbness with speech/language deficit. 12/24/21      HISTORY OF PRESENT ILLNESS:  Caitlin Smith is a 52 y.o. right-hand-dominant female with reported history of cerebral palsy with left lower extremity symptoms, hypertension and headaches on Topamax who endorses a history headaches every couple weeks. She is followed by [facility] neurology. Yesterday she had sudden onset of severe headache associated with left-sided numbness as well as speech language deficit. She presented to the hospital without any focal findings and was admitted for further evaluation. Underwent MRI overnight which did not demonstrate any current or past ischemia with neurology consultation requested for further evaluation. Despite the unrevealing MRI she continues to endorse some degree of symptoms in the left side of her body. Denies any past history of headache like this or focal symptoms like this. No history of stroke or TIA otherwise. Denies any antecedent events which triggered the episode.     PAST MEDICAL HISTORY:  Past Medical History:   Diagnosis Date    Asthma 4/29/2010    Bronchitis     CAD (coronary artery disease)     Sinus Tach    Cerebral palsy ([address] 4/29/2010    Cerebral palsy (HCC)     CP [facility]cere[facility]) ([facility] [address]     Endocrine disease     Thyroid goiter    Goiter     Hypertension     Pt denies hypertension    Ill-defined condition     Plantar fascitis and heel spur    Lumbar disc herniation 4/29/2010    Migraine     Neurological disorder     cerebral palsy    Other ill-defined conditions(799.89)     pleurisy    Other ill-defined conditions(799.89)     Migraines    Psychiatric disorder     anxiety    Sinus tachycardia 5/23/2014    Thyroid disease        PAST SURGICAL HISTORY:  Past Surgical History:   Procedure Laterality Date    HX HEENT      T & A  HX HYSTERECTOMY      HX ORTHOPAEDIC      carpal tunnel release    HX ORTHOPAEDIC      tendon repair R hand    HX ORTHOPAEDIC      pin in toe of R foot    HX TUBAL LIGATION      [place] LAP,TUBAL CAUTERY         FAMILY HISTORY:   Family History   Problem Relation Age of Onset   [facility] Asthma Mother     Hypertension Mother     Diabetes Mother     Cancer Father         prostate    Asthma Daughter     Asthma Daughter     Stroke Maternal Grandfather     Cancer Paternal Grandmother         throat         SOCIAL HISTORY:  Social History     Socioeconomic History    Marital status: LEGALLY    Tobacco Use    Smoking status: Never Smoker    Smokeless tobacco: Never Used   Substance and Sexual Activity    Alcohol use: Yes     Comment: social    Drug use: No    Sexual activity: Yes     Partners: Male     Birth control/protection: Surgical         MEDICATIONS:   Current Facility-Administered Medications   Medication Dose Route Frequency Provider Last Rate Last Admin    topiramate (TOPAMAX) tablet 50 mg  50 mg Oral BID Faviola Clarke MD        ibuprofen (MOTRIN) tablet 600 mg  600 mg Oral Q8H PRN Faviola Calrke MD        butalbital-acetaminophen-caffeine (FIORICET, ESGIC) -40 mg per tablet 1 Tablet  1 Tablet Oral Q12H PRN Faviola Clarke MD        magnesium sulfate 2 g/50 ml IVPB (premix or compounded)  2 g IntraVENous ONCE Karlene Clarke MD 25 mL/hr at 12/24/21 1120 2 g at 12/24/21 1120    ALPRAZolam (XANAX) tablet 0.5 mg  0.5 mg Oral DAILY PRN Hedy Bourne MD        FLUoxetine (PROzac) capsule 20 mg  20 mg Oral DAILY Hedy Bourne MD   20 mg at 12/24/21 2708    metoprolol tartrate (LOPRESSOR) tablet 25 mg  25 mg Oral BID Hedy Bourne MD   25 mg at 12/24/21 0150    gabapentin (NEURONTIN) tablet 600 mg  600 mg Oral TID Hedy Bourne MD   600 mg at 12/24/21 0928    famotidine (PEPCID) tablet 20 mg  20 mg Oral BID Hedy Bourne MD   20 mg at 12/24/21 8802    baclofen (LIORESAL) tablet 10 mg  10 mg Oral TID Johanna Hassan MD   10 mg at 12/24/21 6638    acetaminophen (TYLENOL) tablet 650 mg  650 mg Oral Q4H PRN Johanna Hassan MD   650 mg at 12/24/21 7804    Or    acetaminophen (TYLENOL) solution 650 mg  650 mg Per NG tube Q4H PRN Johanna Hassan MD        Or   Gege See acetaminophen (TYLENOL) suppository 650 mg  650 mg Rectal Q4H PRN Johanna Hassan MD        0.9% sodium chloride infusion  75 mL/hr IntraVENous CONTINUOUS Johanna Hassan MD 75 mL/hr at 12/23/21 2145 75 mL/hr at 12/23/21 2145         ALLERGIES:  Allergies   Allergen Reactions    Pcn [Penicillins] Hives     Swelling in hands          REVIEW OF SYSTEMS:  10 point ROS reviewed with patient and negative except for those listed above. PHYSICAL EXAM:  Vital Signs:   Visit Vitals  /74   Pulse 65   Temp 97.4 °F (36.3 °C)   Resp 16   Wt 164 lb 8 oz (74.6 kg)   LMP 02/20/2015   SpO2 98%   BMI 30.58 kg/m²     Pleasant female muscularly in exam room in no clear distress. HEENT appears grossly unremarkable neck appears supple. Cardiopulmonary exams are unremarkable. Abdomen is nondistended. Extremities appear warm/dry. Neurologically, patient appears alert and oriented attention is intact. Speech is clear, language fluent. Cranials 2 through 12 appear grossly unremarkable. Motor strength is 5 out of 5 on right arm and leg. There is slight pronation in the left upper extremity where strength is grossly 5- out of 5 at left deltoid 5 5 biceps 5 - tricep and 4+ out of 5 wrist extension. Iliopsoas is 5 - to 4+ out of 5, hip abduction and abduction are 5 out of 5. Knee extension is 5 out of 5 knee flexion is 4+ to 5- out of 5. Plantar dorsiflexion are largely 5 out of 5. Sensation is reported to be \"off\" in the left arm as compared to the right. Coordination is intact in upper extremities. Remainder examination is deferred.     PERTINENT DATA:  HbA1c 5.3%, LDL 51.4    CT Results (maximum last 3): Results from [place] encounter on 12/23/21    CT CODE NEURO HEAD WO CONTRAST    Narrative  EXAM: CT CODE NEURO HEAD WO CONTRAST    INDICATION: left sided numbness    COMPARISON: None. CONTRAST: None. TECHNIQUE: Unenhanced CT of the head was performed using 5 mm images. Brain and  bone windows were generated. Coronal and sagittal reformats. CT dose reduction  was achieved through use of a standardized protocol tailored for this  examination and automatic exposure control for dose modulation. FINDINGS:  The ventricles and sulci are normal in size, shape and configuration. . There is  no significant white matter disease. There is no intracranial hemorrhage,  extra-axial collection, or mass effect. The basilar cisterns are open. No CT  evidence of acute infarct. The bone windows demonstrate no abnormalities. The visualized portions of the  paranasal sinuses and mastoid air cells are clear. Impression  No acute findings. Results from [place] encounter on 07/14/14    CT HEAD WO CONT    Narrative  **Final Report**      ICD Codes / Adm. Diagnosis: 52  28 / Headache  Cough  Examination:  CT HEAD WO CON  - [id number] - Jul 14 2014  8:55PM  Accession No:  [id number]  Reason:  headache      REPORT:  INDICATION:  Headache. COMPARISON:  3/8/2012. TECHNIQUE:  Unenhanced CT of the head was performed using 5 mm images. Brain  and bone windows were generated. Coronal and sagittal reformatted images  were then obtained. FINDINGS:  The ventricles and sulci are normal in size, shape and configuration and  midline. There is no intracranial hemorrhage, extra-axial collection, mass,  mass effect or midline shift. The basilar cisterns are open. No acute  infarct is identified. The bone windows demonstrate no abnormalities. The  visualized portions of the paranasal sinuses and mastoid air cells are clear. Impression  :  Normal ventricular size.     No intracranial bleed or shift of the midline. Signing/Reading Doctor: Timothy Aranda ([id number])  Rachele Aranda ([id number])  Jul 14 2014  9:09PM      Results from Hospital Encounter encounter on 03/07/12    CT HEAD WITHOUT CONTRAST    Narrative  **Final Report**      ICD Codes / Adm. Diagnosis: 786.50  780.4 / CHEST PAIN, UNSPECIFIED  DIZZINESS AND GIDDINESS  Examination:  CT HEAD WO CON  - [id number] - Mar  8 2012  1:53AM  Accession No:  [id number]  Reason:  dizziness      REPORT:  HISTORY: Chest pain and dizziness. Axial CT scans of the head without contrast show normal appearance of the  brain parenchyma with no evidence of mass, hemorrhage, shift, or  hydrocephalus. Bone windows are unremarkable. IMPRESSION:  NORMAL STUDY. Signing/Reading Doctor: Ariana Marinelli ([id number])  Wilmar Montejo ([id number])  03/08/2012      MRI Results (maximum last 3): Results from [place] encounter on 12/23/21    MRI BRAIN WO CONT    Narrative  EXAM: MRI BRAIN WO CONT    INDICATION: tia    COMPARISON: Earlier CT. CONTRAST: None. TECHNIQUE:  Multiplanar multisequence acquisition without contrast of the brain. FINDINGS:  The ventricles are normal in size and position. There is no acute infarct,  hemorrhage, extra-axial fluid collection, or mass effect. There is no cerebellar  tonsillar herniation. Expected arterial flow-voids are present. With exception of mild inferior mucosal thickening of the left maxillary sinus,  paranasal sinuses, mastoid air cells, and middle ears are clear. The orbital  contents are within normal limits. No significant osseous or scalp lesions are  identified. Impression  No evidence for acute infarction or other acute finding.     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, chronic headaches and hypertension who presented with worse kneeing headache along with left-sided numbness and speech/language difficulties with an unrevealing MRI    RECOMMENDATIONS:  Migraine with aura:  Given patient's age as well as ongoing symptoms despite negative MRI suspect migraine related phenomenon as likely theology to her symptoms  MRI was unrevealing, will obtain carotid Doppler and echo for completeness however if either delayed discharge would be reasonable to pursue as an outpatient  Would not place on antithrombotic at this time, LDL and A1c are un concerning (51.4 and 5.3% respectively)  Inquire whether patient would want to stay to treat headache versus go home given the holiday with patient saying that she wants to stay  Will attempt's headache cocktail with 10 mg of dexamethasone, 10 mg Compazine, 30 mg ketorolac and 2 g of magnesium as patient is already getting fluids  We will also change pain regimen slightly to include Tylenol for mild pain, ibuprofen for moderate and fioricet for severe  Discontinue tramadol, would not use Imitrex given focal features with headache    We will continue to follow pending results of the above studies please call question concerns      Renaldo Cheng MD

## 2021-12-24 NOTE — ED NOTES
TRANSFER - OUT REPORT:    Verbal report given to Jazmin RN(name) on Refugio Deal  being transferred to 6(unit) for routine progression of care       Report consisted of patients Situation, Background, Assessment and   Recommendations(SBAR). Information from the following report(s) SBAR, ED Summary, [facility] and Recent Results was reviewed with the receiving nurse. Lines:   Peripheral IV 12/23/21 Anterior;Left;Proximal Forearm (Active)        Opportunity for questions and clarification was provided.       Patient transported with:   RN

## 2021-12-24 NOTE — PROGRESS NOTES
Physical Therapy Note    PT orders received and acknowledged. Chart reviewed. Recommend HH PT with use of patients cane for increased safety.     Lisa CHUT

## 2021-12-24 NOTE — PROGRESS NOTES
Problem: Mobility Impaired (Adult and Pediatric)  Goal: *Acute Goals and Plan of Care (Insert Text)  Description: FUNCTIONAL STATUS PRIOR TO ADMISSION: Patient was independent and active without use of DME.    HOME SUPPORT PRIOR TO ADMISSION: The patient lives alone but has people who can help her if needed. Physical Therapy Goals  Initiated 12/24/2021  1. Patient will move from supine to sit and sit to supine  in bed with independence within 7 day(s). 2.  Patient will transfer from bed to chair and chair to bed with independence using the least restrictive device within 7 day(s). 3.  Patient will perform sit to stand with modified independence within 7 day(s). 4.  Patient will ambulate with modified independence for 120 feet with the least restrictive device within 7 day(s). 12/24/2021 1232 by Megan White, PT  Outcome: Progressing Towards Goal   PHYSICAL THERAPY EVALUATION  Patient: Belia Dickens (81 y.o. female)  Date: 12/24/2021  Primary Diagnosis: TIA (transient ischemic attack) [G45.9]        Precautions:        ASSESSMENT  Based on the objective data described below, the patient presents with altered sensation, decreased strength/ROM, and decreased independence with functional mobility S/P for TIA. Imaging is negative for acute infarct. Patient reports a hx of CP resulting in decreased L LE strength and foot drop prior to admission. She continues to report altered sensation in the L UE/ LE compared to the right as well as dizziness. Patient demonstrates positive orthostatic drop from sit<>stand. She performs sit<>stand with wide ELISE but bracing knees together. She is able to ambulate about 45 ft with HHA before reporting she needs to turn around. Patient ambulates with increased trunk sway and decreased bilateral shortened step length. She is left in bedside chair with all needs met.  Patient is advised to use her cane on D/C in order to improve balance and stability especially with L foot drop and altered sensation. Current Level of Function Impacting Discharge (mobility/balance): CGA/ HHA for gait    Functional Outcome Measure: The patient scored 16/28 on the Tinetti outcome measure. Other factors to consider for discharge: medical stability, increased risk for falls     Patient will benefit from skilled therapy intervention to address the above noted impairments. PLAN :  Recommendations and Planned Interventions: bed mobility training, transfer training, gait training, therapeutic exercises, neuromuscular re-education, edema management/control, patient and family training/education, and therapeutic activities      Frequency/Duration: Patient will be followed by physical therapy:  4 times a week to address goals.     Recommendation for discharge: (in order for the patient to meet his/her long term goals)  Physical therapy at least 2 days/week in the home     This discharge recommendation:  Has not yet been discussed the attending provider and/or case management    IF patient discharges home will need the following DME: patient owns DME required for discharge         SUBJECTIVE:   Patient stated I think I would like to start with HH.    OBJECTIVE DATA SUMMARY:   HISTORY:    Past Medical History:   Diagnosis Date    Asthma 4/29/2010    Bronchitis     CAD (coronary artery disease)     Sinus Tach    Cerebral palsy ([address] 4/29/2010    Cerebral palsy ([address]     CP (cerebral palsy) ([address]     Endocrine disease     Thyroid goiter    Goiter     Hypertension     Pt denies hypertension    Ill-defined condition     Plantar fascitis and heel spur    Lumbar disc herniation 4/29/2010    Migraine     Neurological disorder     cerebral palsy    Other ill-defined conditions(799.89)     pleurisy    Other ill-defined conditions(799.89)     Migraines    Psychiatric disorder     anxiety    Sinus tachycardia 5/23/2014    Thyroid disease      Past Surgical History:   Procedure Laterality Date    [facility] T & A    HX HYSTERECTOMY      HX ORTHOPAEDIC      carpal tunnel release    HX ORTHOPAEDIC      tendon repair R hand    HX ORTHOPAEDIC      pin in toe of R foot    HX TUBAL LIGATION      [place] LAP,TUBAL CAUTERY         Personal factors and/or comorbidities impacting plan of care: please see above    Home Situation  Home Environment: Apartment  # Steps to Enter: 0  One/Two Story Residence: One story  Living Alone: Yes  Support Systems: Parent(s),Spouse/Significant Other  Patient Expects to be Discharged to[de-identified] Apartment  Current DME Used/Available at Home: None  Tub or Shower Type: Shower    EXAMINATION/PRESENTATION/DECISION MAKING:   Critical Behavior:  Neurologic State: Alert  Orientation Level: Oriented X4  Cognition: Appropriate decision making,Follows commands  Safety/Judgement: Awareness of environment,Fall prevention,Insight into deficits  Hearing:     Skin:    Edema:   Range Of Motion:  AROM: Generally decreased, functional           PROM: Within functional limits           Strength:    Strength: Generally decreased, functional                    Tone & Sensation:   Tone: Abnormal              Sensation: Impaired               Coordination:  Coordination: Generally decreased, functional  Vision:   Tracking: Requires cues, head turns, or add eye shifts to track  Diplopia: No  Acuity: Within Defined Limits  Corrective Lenses: Glasses  Functional Mobility:  Bed Mobility:     Supine to Sit: Stand-by assistance;Bed Modified     Scooting: Stand-by assistance  Transfers:  Sit to Stand: Contact guard assistance  Stand to Sit: Contact guard assistance        Bed to Chair: Contact guard assistance              Balance:   Sitting: With support; Intact  Standing: Impaired; With support  Standing - Static: Constant support; Fair  Standing - Dynamic : Constant support; Fair  Ambulation/Gait Training:  Distance (ft): 90 Feet (ft)  Assistive Device: Gait belt  Ambulation - Level of Assistance: Contact guard assistance        Gait Abnormalities: Decreased step clearance;Trunk sway increased        Base of Support: Widened  Stance: Left decreased  Speed/Cindi: Slow  Step Length: Right shortened;Left shortened  Swing Pattern: Left asymmetrical                  Stairs: Therapeutic Exercises:       Functional Measure:  Tinetti test:    Sitting Balance: 1  Arises: 1  Attempts to Rise: 1  Immediate Standing Balance: 0  Standing Balance: 1  Nudged: 1  Eyes Closed: 0  Turn 360 Degrees - Continuous/Discontinuous: 0  Turn 360 Degrees - Steady/Unsteady: 1  Sitting Down: 1  Balance Score: 7 Balance total score  Indication of Gait: 1  R Step Length/Height: 1  L Step Length/Height: 1  R Foot Clearance: 1  L Foot Clearance: 1  Step Symmetry: 0  Step Continuity: 1  Path: 1  Trunk: 1  Walking Time: 1  Gait Score: 9 Gait total score  Total Score: 16/28 Overall total score         Tinetti Tool Score Risk of Falls  <19 = High Fall Risk  19-24 = Moderate Fall Risk  25-28 = Low Fall Risk  Tinetti ME. Performance-Oriented Assessment of Mobility Problems in Elderly Patients. [address]; [id number].  (Scoring Description: PT Bulletin Feb. 10, 1993)    Older adults: Dennis Mendez et al, 2009; n = 1000 [facility] elderly evaluated with ABC, NISHA, ADL, and IADL)  · Mean NISHA score for males aged 69-68 years = 26.21(3.40)  · Mean NISHA score for females age 69-68 years = 25.16(4.30)  · Mean NISHA score for males over 80 years = 23.29(6.02)  · Mean NISHA score for females over 80 years = 17.20(8.32)            Physical Therapy Evaluation Charge Determination   History Examination Presentation Decision-Making   HIGH Complexity :3+ comorbidities / personal factors will impact the outcome/ POC  LOW Complexity : 1-2 Standardized tests and measures addressing body structure, function, activity limitation and / or participation in recreation  LOW Complexity : Stable, uncomplicated  Other outcome measures Tinetti  HIGH       Based on the above components, the patient evaluation is determined to be of the following complexity level: HIGH     Pain Rating:      Activity Tolerance:   Fair and signs and symptoms of orthostatic hypotension    After treatment patient left in no apparent distress:   Sitting in chair and Call bell within reach    COMMUNICATION/EDUCATION:   The patients plan of care was discussed with: Occupational therapist and Registered nurse. Fall prevention education was provided and the patient/caregiver indicated understanding., Patient/family have participated as able in goal setting and plan of care. , and Patient/family agree to work toward stated goals and plan of care.     Thank you for this referral.  Helena Coulter, PT   Time Calculation: 28 mins

## 2021-12-24 NOTE — PROGRESS NOTES
Problem: Patient Education: Go to Patient Education Activity  Goal: Patient/Family Education  Outcome: Progressing Towards Goal     Problem: TIA/CVA Stroke: 0-24 hours  Goal: Off Pathway (Use only if patient is Off Pathway)  Outcome: Progressing Towards Goal  Goal: Activity/Safety  Outcome: Progressing Towards Goal  Goal: Consults, if ordered  Outcome: Progressing Towards Goal  Goal: Diagnostic Test/Procedures  Outcome: Progressing Towards Goal  Goal: Nutrition/Diet  Outcome: Progressing Towards Goal  Goal: Discharge Planning  Outcome: Progressing Towards Goal  Goal: Medications  Outcome: Progressing Towards Goal  Goal: Respiratory  Outcome: Progressing Towards Goal  Goal: Treatments/Interventions/Procedures  Outcome: Progressing Towards Goal  Goal: Minimize risk of bleeding post-thrombolytic infusion  Outcome: Progressing Towards Goal  Goal: Monitor for complications post-thrombolytic infusion  Outcome: Progressing Towards Goal  Goal: Psychosocial  Outcome: Progressing Towards Goal  Goal: *Hemodynamically stable  Outcome: Progressing Towards Goal  Goal: *Neurologically stable  Description: Absence of additional neurological deficits    Outcome: Progressing Towards Goal  Goal: *Verbalizes anxiety and depression are reduced or absent  Outcome: Progressing Towards Goal  Goal: *Absence of Signs of Aspiration on Current Diet  Outcome: Progressing Towards Goal  Goal: *Absence of deep venous thrombosis signs and symptoms(Stroke Metric)  Outcome: Progressing Towards Goal  Goal: *Ability to perform ADLs and demonstrates progressive mobility and function  Outcome: Progressing Towards Goal  Goal: *Stroke education started(Stroke Metric)  Outcome: Progressing Towards Goal  Goal: *Dysphagia screen performed(Stroke Metric)  Outcome: Progressing Towards Goal  Goal: *Rehab consulted(Stroke Metric)  Outcome: Progressing Towards Goal     Problem: Ischemic Stroke: Discharge Outcomes  Goal: *Verbalizes anxiety and depression are reduced or absent  Outcome: Progressing Towards Goal  Goal: *Verbalize understanding of risk factor modification(Stroke Metric)  Outcome: Progressing Towards Goal  Goal: *Hemodynamically stable  Outcome: Progressing Towards Goal  Goal: *Absence of aspiration pneumonia  Outcome: Progressing Towards Goal  Goal: *Aware of needed dietary changes  Outcome: Progressing Towards Goal  Goal: *Verbalize understanding of prescribed medications including anti-coagulants, anti-lipid, and/or anti-platelets(Stroke Metric)  Outcome: Progressing Towards Goal  Goal: *Tolerating diet  Outcome: Progressing Towards Goal  Goal: *Aware of follow-up diagnostics related to anticoagulants  Outcome: Progressing Towards Goal  Goal: *Ability to perform ADLs and demonstrates progressive mobility and function  Outcome: Progressing Towards Goal  Goal: *Absence of DVT(Stroke Metric)  Outcome: Progressing Towards Goal  Goal: *Absence of aspiration  Outcome: Progressing Towards Goal  Goal: *Optimal pain control at patient's stated goal  Outcome: Progressing Towards Goal  Goal: *Home safety concerns addressed  Outcome: Progressing Towards Goal  Goal: *Describes available resources and support systems  Outcome: Progressing Towards Goal  Goal: *Verbalizes understanding of activation of EMS(911) for stroke symptoms(Stroke Metric)  Outcome: Progressing Towards Goal  Goal: *Understands and describes signs and symptoms to report to providers(Stroke Metric)  Outcome: Progressing Towards Goal  Goal: *Neurolgocially stable (absence of additional neurological deficits)  Outcome: Progressing Towards Goal  Goal: *Verbalizes importance of follow-up with primary care physician(Stroke Metric)  Outcome: Progressing Towards Goal  Goal: *Smoking cessation discussed,if applicable(Stroke Metric)  Outcome: Progressing Towards Goal  Goal: *Depression screening completed(Stroke Metric)  Outcome: Progressing Towards Goal

## 2021-12-24 NOTE — PROGRESS NOTES
Transition of Care Plan:                      * Disposition- Home with support of parent and significant other  * [facility] has accepted and will open for service 12/28  * Patient owns recommended DME- cane        Reason for Admission:  speech difficulty                     RUR Score:    N/A                 Plan for utilizing home health:        Home Health recommended  PCP: First and Last name:  Ama Toth NP     Name of Practice:    Are you a current patient: Yes/No: Yes   Approximate date of last visit: Unknown   Can you participate in a virtual visit with your PCP:                   Yes  Current Advanced Directive/Advance Care Plan: Full Code      Healthcare Decision Maker:   Click here to complete [address] including selection of the Healthcare Decision Maker Relationship (ie \"Primary\")                             Transition of Care Plan:                      * Disposition- Home with support of parent and significant other  * Referral placed to local home health agencies for PT  * Patient owns recommended DME- cane    Prasad Jaeger is a 52 yr old female who presents to hospital with speech difficulty. MRI negative for acute infarct. Per SLP symptoms resolved. PT is recommending home health PT. Patient stated she will have support of her Mother and significant other when she goes home. Oral and Written notification given to patient and/or caregiver informing patient of current Observation status receiving care in our facility. Copied placed on bedside chart. 12 PM _ [facility] has accepted and can open for service 12/28 post obtaining insurance Auth. Patient is aware and in agreement. Care Management Interventions  PCP Verified by CM:  Yes  Discharge Durable Medical Equipment: No  Health Maintenance Reviewed: Yes  Physical Therapy Consult: Yes  Occupational Therapy Consult: Yes  Speech Therapy Consult: Yes  Support Systems: Parent(s),Spouse/Significant Other  The Patient and/or Patient Representative was Provided with a Choice of Provider and Agrees with the Discharge Plan?: Yes  Name of the Patient Representative Who was Provided with a Choice of Provider and Agrees with the Discharge Plan: Patient  Freedom of Choice List was Provided with Basic Dialogue that Supports the Patient's Individualized Plan of Care/Goals, Treatment Preferences and Shares the Quality Data Associated with the Providers?: Yes  [place] Resource Information Provided?: No  Discharge Location  Discharge Placement: Home with home health     Helen Palomino, NIKKIM-SW  Case Management [address]  C: [phone]

## 2021-12-24 NOTE — PROGRESS NOTES
[facility] Adult  Hospitalist Group                                                                                          Hospitalist Progress Note  Gabriella Soriano MD  Answering service: [phone]  OR [phone] from in house phone        Date of Service:  2021  NAME:  Ricki Valerio  :  1970  MRN:  [id number]      Admission Summary:     Patient admitted for further evaluation after she presented with speech difficulty, headache and some numbness and tingling of the left hand and feet. In the ED, CT head was negative for acute pathology. Subsequent MRI of the brain shows no evidence of acute infarction or other acute finding. Echo and carotic Doppler pending. Neurology evaluating the patient.     Interval history / Subjective:       Patient still with some headache, denies any weakness numbness or difficulty with speech this AM.     Assessment & Plan:     Migraine  -Patient presented with headache along with paresthesia of the left side and difficulty with speech  -MRI of the brain shows no acute pathology, echo and carotid Doppler pending  -Both paresthesia and speech difficulty has resolved, still with residual headache  -Neurology evaluated the patient and the impression was symptoms likely related to migraine related phenomenon  -Neurology starting headache cocktail with 10 mg of dexamethasone, 10 mg Compazine, 30 mg ketorolac and 2 g of magnesium    Hypertension  -Continue metoprolol and monitor blood pressure    Anxiety disorder  -Continue SSRI    Cerebral palsy  -Stable    Code status: Full  DVT prophylaxis: SCDs    Care Plan discussed with: Patient/Family  Anticipated Disposition: Home w/Family  Anticipated Discharge: 24 hours to 48 hours     Hospital Problems  Date Reviewed: 2017          Codes Class Noted POA    * (Principal) Dysarthria ICD-10-CM: R47.1  ICD-9-CM: 784.51  2021 Unknown        TIA (transient ischemic attack) ICD-10-CM: G45.9  ICD-9-CM: 435.9  2021 Unknown                Review of Systems:   A comprehensive review of systems was negative except for that written in the HPI. Vital Signs:    Last 24hrs VS reviewed since prior progress note. Most recent are:  Visit Vitals  /74   Pulse 65   Temp 97.4 °F (36.3 °C)   Resp 16   Wt 74.6 kg (164 lb 8 oz)   SpO2 98%   BMI 30.58 kg/m²       No intake or output data in the 24 hours ending 12/24/21 1310     Physical Examination:     I had a face to face encounter with this patient and independently examined them on 12/24/2021 as outlined below:          Constitutional:  No acute distress, cooperative, pleasant    ENT:  Oral mucosa moist, oropharynx benign. Resp:  CTA bilaterally. No wheezing/rhonchi/rales. No accessory muscle use   CV:  Regular rhythm, normal rate, no murmurs, gallops, rubs    GI:  Soft, non distended, non tender. normoactive bowel sounds, no hepatosplenomegaly     Musculoskeletal:  No edema, warm, 2+ pulses throughout    Neurologic:  Moves all extremities. AAOx3, CN II-XII reviewed            Data Review:    Review and/or order of clinical lab test      Labs:     Recent Labs     12/23/21  1456   WBC 11.8*   HGB 13.8   HCT 44.7        Recent Labs     12/23/21  1456      K 3.8      CO2 24   BUN 12   CREA 0.87   GLU 92   CA 10.0     Recent Labs     12/23/21  1456   ALT 40      TBILI 0.1*   TP 8.2   ALB 3.8   GLOB 4.4*     Recent Labs     12/23/21  1456   INR 1.0   PTP 10.3   APTT 30.2      No results for input(s): FE, TIBC, PSAT, FERR in the last 72 hours. Lab Results   Component Value Date/Time    Folate >19.9 01/13/2015 02:30 PM      No results for input(s): PH, PCO2, PO2 in the last 72 hours. No results for input(s): CPK, CKNDX, TROIQ in the last 72 hours.     No lab exists for component: CPKMB  Lab Results   Component Value Date/Time    Cholesterol, total 135 12/24/2021 04:13 AM    HDL Cholesterol 54 12/24/2021 04:13 AM    LDL, calculated 51.4 12/24/2021 04:13 AM Triglyceride 148 12/24/2021 04:13 AM    CHOL/HDL Ratio 2.5 12/24/2021 04:13 AM     Lab Results   Component Value Date/Time    Glucose  02/19/2013 10:42 AM    Glucose POC 97 10/27/2011 10:43 AM    Glucose POC 80 11/22/2010 03:43 PM     Lab Results   Component Value Date/Time    Color YELLOW/STRAW 12/23/2021 02:53 PM    Appearance CLEAR 12/23/2021 02:53 PM    Specific gravity 1.008 12/23/2021 02:53 PM    pH (UA) 7.0 12/23/2021 02:53 PM    Protein Negative 12/23/2021 02:53 PM    Glucose Negative 12/23/2021 02:53 PM    Ketone Negative 12/23/2021 02:53 PM    Bilirubin Negative 12/23/2021 02:53 PM    Urobilinogen 0.2 12/23/2021 02:53 PM    Nitrites Negative 12/23/2021 02:53 PM    Leukocyte Esterase Negative 12/23/2021 02:53 PM         Medications Reviewed:     Current Facility-Administered Medications   Medication Dose Route Frequency    topiramate (TOPAMAX) tablet 50 mg  50 mg Oral BID    ibuprofen (MOTRIN) tablet 600 mg  600 mg Oral Q8H PRN    butalbital-acetaminophen-caffeine (FIORICET, ESGIC) -40 mg per tablet 1 Tablet  1 Tablet Oral Q12H PRN    magnesium sulfate 2 g/50 ml IVPB (premix or compounded)  2 g IntraVENous ONCE    ALPRAZolam (XANAX) tablet 0.5 mg  0.5 mg Oral DAILY PRN    FLUoxetine (PROzac) capsule 20 mg  20 mg Oral DAILY    metoprolol tartrate (LOPRESSOR) tablet 25 mg  25 mg Oral BID    gabapentin (NEURONTIN) tablet 600 mg  600 mg Oral TID    famotidine (PEPCID) tablet 20 mg  20 mg Oral BID    baclofen (LIORESAL) tablet 10 mg  10 mg Oral TID    acetaminophen (TYLENOL) tablet 650 mg  650 mg Oral Q4H PRN    Or    acetaminophen (TYLENOL) solution 650 mg  650 mg Per NG tube Q4H PRN    Or    acetaminophen (TYLENOL) suppository 650 mg  650 mg Rectal Q4H PRN    0.9% sodium chloride infusion  75 mL/hr IntraVENous CONTINUOUS     ______________________________________________________________________  EXPECTED LENGTH OF STAY: - - -  ACTUAL LENGTH OF STAY:          0                 Maxime Hayes Graham MD

## 2021-12-24 NOTE — PROGRESS NOTES

## 2021-12-24 NOTE — ED NOTES
Bedside and Verbal shift change report given to Eldon Medley RN (oncoming nurse) by Lolis Saldivar RN (offgoing nurse). Report included the following information SBAR, Kardex, ED Summary, Intake/Output, MAR, Recent Results and Med Rec Status.

## 2021-12-25 PROCEDURE — 93005 ELECTROCARDIOGRAM TRACING: CPT

## 2021-12-25 PROCEDURE — G0378 HOSPITAL OBSERVATION PER HR: HCPCS

## 2021-12-25 PROCEDURE — 74011250637 HC RX REV CODE- 250/637: Performed by: HOSPITALIST

## 2021-12-25 PROCEDURE — 74011250637 HC RX REV CODE- 250/637: Performed by: PSYCHIATRY & NEUROLOGY

## 2021-12-25 RX ADMIN — TOPIRAMATE 50 MG: 25 TABLET, FILM COATED ORAL at 17:20

## 2021-12-25 RX ADMIN — METOPROLOL TARTRATE 25 MG: 25 TABLET, FILM COATED ORAL at 08:08

## 2021-12-25 RX ADMIN — BACLOFEN 10 MG: 10 TABLET ORAL at 15:12

## 2021-12-25 RX ADMIN — BACLOFEN 10 MG: 10 TABLET ORAL at 08:08

## 2021-12-25 RX ADMIN — FAMOTIDINE 20 MG: 20 TABLET ORAL at 08:08

## 2021-12-25 RX ADMIN — TOPIRAMATE 50 MG: 25 TABLET, FILM COATED ORAL at 08:08

## 2021-12-25 RX ADMIN — METOPROLOL TARTRATE 25 MG: 25 TABLET, FILM COATED ORAL at 17:20

## 2021-12-25 RX ADMIN — ACETAMINOPHEN 650 MG: 325 TABLET ORAL at 21:11

## 2021-12-25 RX ADMIN — FLUOXETINE 20 MG: 20 CAPSULE ORAL at 08:08

## 2021-12-25 RX ADMIN — BACLOFEN 10 MG: 10 TABLET ORAL at 21:11

## 2021-12-25 RX ADMIN — FAMOTIDINE 20 MG: 20 TABLET ORAL at 21:11

## 2021-12-25 RX ADMIN — GABAPENTIN 600 MG: 600 TABLET, FILM COATED ORAL at 21:11

## 2021-12-25 RX ADMIN — GABAPENTIN 600 MG: 600 TABLET, FILM COATED ORAL at 08:08

## 2021-12-25 RX ADMIN — ACETAMINOPHEN 650 MG: 325 TABLET ORAL at 11:39

## 2021-12-25 RX ADMIN — ACETAMINOPHEN 650 MG: 325 TABLET ORAL at 06:00

## 2021-12-25 RX ADMIN — GABAPENTIN 600 MG: 600 TABLET, FILM COATED ORAL at 15:12

## 2021-12-25 NOTE — PROGRESS NOTES
[facility] [facility] Adult  Hospitalist Group                                                                                          Hospitalist Progress Note  Jessenia Darnell MD  Answering service: [phone]  OR [phone] from in house phone        Date of Service:  2021  NAME:  Carola Ramos  :  1970  MRN:  [id number]      Admission Summary:     Patient admitted for further evaluation after she presented with speech difficulty, headache and some numbness and tingling of the left hand and feet. In the ED, CT head was negative for acute pathology. Subsequent MRI of the brain shows no evidence of acute infarction or other acute finding. Echo and carotic Doppler pending. Neurology evaluating the patient. Interval history / Subjective:       Patient still with some headache, complaining of some pulling pain in the left chest.  Patient has had similar pain on and off.      Assessment & Plan:     Migraine  -Patient presented with headache along with paresthesia of the left side and difficulty with speech  -MRI of the brain shows no acute pathology, echo and carotid Doppler pending  -Both paresthesia and speech difficulty has resolved, still with residual headache  -Neurology evaluated the patient and the impression was symptoms likely related to migraine related phenomenon  -Patient has received headache cocktail with 10 mg of dexamethasone, 10 mg Compazine, 30 mg ketorolac and 2 g of magnesium    Chest pain  -Sounds musculoskeletal  -Check 12-lead EKG    Hypertension  -Continue metoprolol and monitor blood pressure    Anxiety disorder  -Continue SSRI    Cerebral palsy  -Stable, patient states that she is in the process of filing for disability    Code status: Full  DVT prophylaxis: SCDs    Care Plan discussed with: Patient/Family  Anticipated Disposition: Home w/Family  Anticipated Discharge: 24 hours to 48 hours     Hospital Problems  Date Reviewed: 2017          Codes Class Noted POA    * (Principal) Dysarthria ICD-10-CM: R47.1  ICD-9-CM: 784.51  12/23/2021 Unknown        TIA (transient ischemic attack) ICD-10-CM: G45.9  ICD-9-CM: 435.9  12/23/2021 Unknown                Review of Systems:   A comprehensive review of systems was negative except for that written in the HPI. Vital Signs:    Last 24hrs VS reviewed since prior progress note. Most recent are:  Visit Vitals  /73   Pulse 66   Temp 98.1 °F (36.7 °C)   Resp 19   Wt 74.7 kg (164 lb 10.9 oz)   SpO2 97%   BMI 30.61 kg/m²       No intake or output data in the 24 hours ending 12/25/21 1247     Physical Examination:     I had a face to face encounter with this patient and independently examined them on 12/25/2021 as outlined below:          Constitutional:  No acute distress, cooperative, pleasant    ENT:  Oral mucosa moist, oropharynx benign. Resp:  CTA bilaterally. No wheezing/rhonchi/rales. No accessory muscle use   CV:  Regular rhythm, normal rate, no murmurs, gallops, rubs, tenderness over the left chest    GI:  Soft, non distended, non tender. normoactive bowel sounds, no hepatosplenomegaly     Musculoskeletal:  No edema, warm, 2+ pulses throughout    Neurologic:  Moves all extremities. AAOx3, CN II-XII reviewed            Data Review:    Review and/or order of clinical lab test      Labs:     Recent Labs     12/23/21  1456   WBC 11.8*   HGB 13.8   HCT 44.7        Recent Labs     12/23/21  1456      K 3.8      CO2 24   BUN 12   CREA 0.87   GLU 92   CA 10.0     Recent Labs     12/23/21  1456   ALT 40      TBILI 0.1*   TP 8.2   ALB 3.8   GLOB 4.4*     Recent Labs     12/23/21  1456   INR 1.0   PTP 10.3   APTT 30.2      No results for input(s): FE, TIBC, PSAT, FERR in the last 72 hours. Lab Results   Component Value Date/Time    Folate >19.9 01/13/2015 02:30 PM      No results for input(s): PH, PCO2, PO2 in the last 72 hours. No results for input(s): CPK, CKNDX, TROIQ in the last 72 hours.     No lab exists for component: CPKMB  Lab Results   Component Value Date/Time    Cholesterol, total 135 12/24/2021 04:13 AM    HDL Cholesterol 54 12/24/2021 04:13 AM    LDL, calculated 51.4 12/24/2021 04:13 AM    Triglyceride 148 12/24/2021 04:13 AM    CHOL/HDL Ratio 2.5 12/24/2021 04:13 AM     Lab Results   Component Value Date/Time    Glucose  02/19/2013 10:42 AM    Glucose POC 97 10/27/2011 10:43 AM    Glucose POC 80 11/22/2010 03:43 PM     Lab Results   Component Value Date/Time    Color YELLOW/STRAW 12/23/2021 02:53 PM    Appearance CLEAR 12/23/2021 02:53 PM    Specific gravity 1.008 12/23/2021 02:53 PM    pH (UA) 7.0 12/23/2021 02:53 PM    Protein Negative 12/23/2021 02:53 PM    Glucose Negative 12/23/2021 02:53 PM    Ketone Negative 12/23/2021 02:53 PM    Bilirubin Negative 12/23/2021 02:53 PM    Urobilinogen 0.2 12/23/2021 02:53 PM    Nitrites Negative 12/23/2021 02:53 PM    Leukocyte Esterase Negative 12/23/2021 02:53 PM         Medications Reviewed:     Current Facility-Administered Medications   Medication Dose Route Frequency    topiramate (TOPAMAX) tablet 50 mg  50 mg Oral BID    ibuprofen (MOTRIN) tablet 600 mg  600 mg Oral Q8H PRN    butalbital-acetaminophen-caffeine (FIORICET, ESGIC) -40 mg per tablet 1 Tablet  1 Tablet Oral Q12H PRN    ALPRAZolam (XANAX) tablet 0.5 mg  0.5 mg Oral DAILY PRN    FLUoxetine (PROzac) capsule 20 mg  20 mg Oral DAILY    metoprolol tartrate (LOPRESSOR) tablet 25 mg  25 mg Oral BID    gabapentin (NEURONTIN) tablet 600 mg  600 mg Oral TID    famotidine (PEPCID) tablet 20 mg  20 mg Oral BID    baclofen (LIORESAL) tablet 10 mg  10 mg Oral TID    acetaminophen (TYLENOL) tablet 650 mg  650 mg Oral Q4H PRN    Or    acetaminophen (TYLENOL) solution 650 mg  650 mg Per NG tube Q4H PRN    Or    acetaminophen (TYLENOL) suppository 650 mg  650 mg Rectal Q4H PRN     ______________________________________________________________________  EXPECTED LENGTH OF STAY: - - -  ACTUAL LENGTH OF STAY:          0                 Trisha Honr MD

## 2021-12-25 NOTE — PROGRESS NOTES
Bedside shift change report given to [address] (oncoming nurse) by Trisha Carlisle RN (offgoing nurse). Report included the following information SBAR, Kardex, ED Summary, Intake/Output, MAR, Accordion, Cardiac Rhythm SR, Alarm Parameters , Quality Measures and Dual Neuro Assessment.

## 2021-12-25 NOTE — PROGRESS NOTES
Bedside shift change report given to [id number] (oncoming nurse) by Lety Pratt RN (offgoing nurse). Report included the following information SBAR, Kardex, ED Summary, OR Summary, Procedure Summary, Intake/Output, MAR, Cardiac Rhythm NSR, Alarm Parameters  and Dual Neuro Assessment.

## 2021-12-26 ENCOUNTER — APPOINTMENT (OUTPATIENT)
Dept: GENERAL RADIOLOGY | Age: 51
End: 2021-12-26
Attending: NURSE PRACTITIONER
Payer: MEDICAID

## 2021-12-26 ENCOUNTER — APPOINTMENT (OUTPATIENT)
Dept: VASCULAR SURGERY | Age: 51
End: 2021-12-26
Attending: PSYCHIATRY & NEUROLOGY
Payer: MEDICAID

## 2021-12-26 LAB
ATRIAL RATE: 75 BPM
CALCULATED P AXIS, ECG09: 64 DEGREES
CALCULATED R AXIS, ECG10: 39 DEGREES
CALCULATED T AXIS, ECG11: 41 DEGREES
DIAGNOSIS, 93000: NORMAL
P-R INTERVAL, ECG05: 158 MS
Q-T INTERVAL, ECG07: 352 MS
QRS DURATION, ECG06: 70 MS
QTC CALCULATION (BEZET), ECG08: 393 MS
VENTRICULAR RATE, ECG03: 75 BPM

## 2021-12-26 PROCEDURE — 74011250637 HC RX REV CODE- 250/637: Performed by: HOSPITALIST

## 2021-12-26 PROCEDURE — 74011250637 HC RX REV CODE- 250/637: Performed by: PSYCHIATRY & NEUROLOGY

## 2021-12-26 PROCEDURE — G0378 HOSPITAL OBSERVATION PER HR: HCPCS

## 2021-12-26 PROCEDURE — 93880 EXTRACRANIAL BILAT STUDY: CPT

## 2021-12-26 PROCEDURE — 73030 X-RAY EXAM OF SHOULDER: CPT

## 2021-12-26 RX ADMIN — BACLOFEN 10 MG: 10 TABLET ORAL at 08:06

## 2021-12-26 RX ADMIN — GABAPENTIN 600 MG: 600 TABLET, FILM COATED ORAL at 22:47

## 2021-12-26 RX ADMIN — TOPIRAMATE 50 MG: 25 TABLET, FILM COATED ORAL at 17:34

## 2021-12-26 RX ADMIN — METOPROLOL TARTRATE 25 MG: 25 TABLET, FILM COATED ORAL at 17:34

## 2021-12-26 RX ADMIN — BACLOFEN 10 MG: 10 TABLET ORAL at 15:24

## 2021-12-26 RX ADMIN — TOPIRAMATE 50 MG: 25 TABLET, FILM COATED ORAL at 08:06

## 2021-12-26 RX ADMIN — FAMOTIDINE 20 MG: 20 TABLET ORAL at 22:47

## 2021-12-26 RX ADMIN — METOPROLOL TARTRATE 25 MG: 25 TABLET, FILM COATED ORAL at 08:06

## 2021-12-26 RX ADMIN — GABAPENTIN 600 MG: 600 TABLET, FILM COATED ORAL at 08:06

## 2021-12-26 RX ADMIN — BACLOFEN 10 MG: 10 TABLET ORAL at 22:47

## 2021-12-26 RX ADMIN — FLUOXETINE 20 MG: 20 CAPSULE ORAL at 08:06

## 2021-12-26 RX ADMIN — ACETAMINOPHEN 650 MG: 325 TABLET ORAL at 05:47

## 2021-12-26 RX ADMIN — GABAPENTIN 600 MG: 600 TABLET, FILM COATED ORAL at 15:24

## 2021-12-26 RX ADMIN — FAMOTIDINE 20 MG: 20 TABLET ORAL at 08:06

## 2021-12-26 NOTE — PROGRESS NOTES
Bedside shift change report given to [address] (oncoming nurse) by Carol Medina RN (offgoing nurse). Report included the following information SBAR, Kardex, ED Summary, OR Summary, Procedure Summary, Intake/Output, MAR, Cardiac Rhythm NSR, Alarm Parameters  and Dual Neuro Assessment.

## 2021-12-26 NOTE — PROGRESS NOTES
[facility]                                                                                          Hospitalist Progress Note  Sherren Carpen, NP  Answering service: [phone]  OR [phone] from in house phone        Date of Service:  2021  NAME:  Deisi Cooper  :  1970  MRN:  [id number]      Admission Summary:     Patient admitted for further evaluation after she presented with speech difficulty, headache and some numbness and tingling of the left hand and feet. In the ED, CT head was negative for acute pathology. Subsequent MRI of the brain shows no evidence of acute infarction or other acute finding. Echo and carotic Doppler pending. Neurology evaluating the patient. Interval history / Subjective:       I saw the patient this morning on rounds. With waxing and waning headache. Also with some complaints of left shoulder pain going down to her chest.  EKG overnight was unremarkable.      Assessment & Plan:     Migraine  Patient presented with headache along with paresthesia of the left side and difficulty with speech  MRI of the brain shows no acute pathology  And evaluated by neurology, likely migraine symptoms  Cardiogram pending  Doppler of carotid arteries also pending   Continuing Fioricet        Left shoulder/chest pain  Evaluation this appears to be musculoskeletal  EKG was unremarkable  We will check shoulder radiograph  Continuing baclofen      Hypertension  Pressure currently controlled  Continuing metoprolol    Anxiety disorder  Stable  Continue alprazolam as needed  Continuing fluoxetine      Cerebral palsy  Stable    Code status: Full  DVT prophylaxis: SCDs    Care Plan discussed with: Patient/Family  Anticipated Disposition: Home w/Family  Anticipated Discharge: 24 hours to 48 hours     Hospital Problems  Date Reviewed: 2017          Codes Class Noted POA    * (Principal) Dysarthria ICD-10-CM: R47.1  ICD-9-CM: 784.51  2021 Unknown TIA (transient ischemic attack) ICD-10-CM: G45.9  ICD-9-CM: 435.9  12/23/2021 Unknown                Review of Systems:   A comprehensive review of systems was negative except for that written in the HPI. Vital Signs:    Last 24hrs VS reviewed since prior progress note. Most recent are:  Visit Vitals  /70   Pulse 65   Temp 98.2 °F (36.8 °C)   Resp 20   Wt 74.9 kg (165 lb 2 oz)   SpO2 99%   BMI 30.69 kg/m²       No intake or output data in the 24 hours ending 12/26/21 1029     Physical Examination:     I had a face to face encounter with this patient and independently examined them on 12/26/2021 as outlined below:          Constitutional:  No acute distress, cooperative, pleasant    ENT:  Oral mucosa moist, oropharynx benign. Resp:  CTA bilaterally. No wheezing/rhonchi/rales. No accessory muscle use   CV:  Regular rhythm, normal rate, no murmurs, gallops, rubs, tenderness over the left chest    GI:  Soft, non distended, non tender. normoactive bowel sounds, no hepatosplenomegaly     Musculoskeletal:  No edema, warm, 2+ pulses throughout    Neurologic:  Moves all extremities. AAOx3, CN II-XII reviewed, some decreased sensation left side of the face, equal strength all extremities            Data Review:    Review and/or order of clinical lab test      Labs:     Recent Labs     12/23/21  1456   WBC 11.8*   HGB 13.8   HCT 44.7        Recent Labs     12/23/21  1456      K 3.8      CO2 24   BUN 12   CREA 0.87   GLU 92   CA 10.0     Recent Labs     12/23/21  1456   ALT 40      TBILI 0.1*   TP 8.2   ALB 3.8   GLOB 4.4*     Recent Labs     12/23/21  1456   INR 1.0   PTP 10.3   APTT 30.2      No results for input(s): FE, TIBC, PSAT, FERR in the last 72 hours. Lab Results   Component Value Date/Time    Folate >19.9 01/13/2015 02:30 PM      No results for input(s): PH, PCO2, PO2 in the last 72 hours. No results for input(s): CPK, CKNDX, TROIQ in the last 72 hours.     No lab exists for component: CPKMB  Lab Results   Component Value Date/Time    Cholesterol, total 135 12/24/2021 04:13 AM    HDL Cholesterol 54 12/24/2021 04:13 AM    LDL, calculated 51.4 12/24/2021 04:13 AM    Triglyceride 148 12/24/2021 04:13 AM    CHOL/HDL Ratio 2.5 12/24/2021 04:13 AM     Lab Results   Component Value Date/Time    Glucose  02/19/2013 10:42 AM    Glucose POC 97 10/27/2011 10:43 AM    Glucose POC 80 11/22/2010 03:43 PM     Lab Results   Component Value Date/Time    Color YELLOW/STRAW 12/23/2021 02:53 PM    Appearance CLEAR 12/23/2021 02:53 PM    Specific gravity 1.008 12/23/2021 02:53 PM    pH (UA) 7.0 12/23/2021 02:53 PM    Protein Negative 12/23/2021 02:53 PM    Glucose Negative 12/23/2021 02:53 PM    Ketone Negative 12/23/2021 02:53 PM    Bilirubin Negative 12/23/2021 02:53 PM    Urobilinogen 0.2 12/23/2021 02:53 PM    Nitrites Negative 12/23/2021 02:53 PM    Leukocyte Esterase Negative 12/23/2021 02:53 PM         Medications Reviewed:     Current Facility-Administered Medications   Medication Dose Route Frequency    topiramate (TOPAMAX) tablet 50 mg  50 mg Oral BID    ibuprofen (MOTRIN) tablet 600 mg  600 mg Oral Q8H PRN    butalbital-acetaminophen-caffeine (FIORICET, ESGIC) -40 mg per tablet 1 Tablet  1 Tablet Oral Q12H PRN    ALPRAZolam (XANAX) tablet 0.5 mg  0.5 mg Oral DAILY PRN    FLUoxetine (PROzac) capsule 20 mg  20 mg Oral DAILY    metoprolol tartrate (LOPRESSOR) tablet 25 mg  25 mg Oral BID    gabapentin (NEURONTIN) tablet 600 mg  600 mg Oral TID    famotidine (PEPCID) tablet 20 mg  20 mg Oral BID    baclofen (LIORESAL) tablet 10 mg  10 mg Oral TID    acetaminophen (TYLENOL) tablet 650 mg  650 mg Oral Q4H PRN    Or    acetaminophen (TYLENOL) solution 650 mg  650 mg Per NG tube Q4H PRN    Or    acetaminophen (TYLENOL) suppository 650 mg  650 mg Rectal Q4H PRN     ______________________________________________________________________  EXPECTED LENGTH OF STAY: - - -  ACTUAL LENGTH OF STAY:          0                 Alfreda Diaz, NP

## 2021-12-26 NOTE — PROGRESS NOTES
Problem: TIA/CVA Stroke: Day 2 Until Discharge  Goal: Medications  Outcome: Progressing Towards Goal     Problem: TIA/CVA Stroke: Day 2 Until Discharge  Goal: Respiratory  Outcome: Progressing Towards Goal     Problem: TIA/CVA Stroke: Day 2 Until Discharge  Goal: Treatments/Interventions/Procedures  Outcome: Progressing Towards Goal     Problem: TIA/CVA Stroke: Day 2 Until Discharge  Goal: Psychosocial  Outcome: Progressing Towards Goal     Problem: TIA/CVA Stroke: Day 2 Until Discharge  Goal: *Verbalizes anxiety and depression are reduced or absent  Outcome: Progressing Towards Goal     Problem: TIA/CVA Stroke: Day 2 Until Discharge  Goal: *Stroke education continued(Stroke Metric)  Outcome: Progressing Towards Goal     Problem: Ischemic Stroke: Discharge Outcomes  Goal: *Describes available resources and support systems  Outcome: Progressing Towards Goal

## 2021-12-26 NOTE — PROGRESS NOTES
Bedside shift change report given to [id number] (oncoming nurse) by Jojo Kearney RN (offgoing nurse). Report included the following information SBAR, Kardex, ED Summary, OR Summary, Procedure Summary, Intake/Output, MAR, Cardiac Rhythm NSR, Alarm Parameters  and Dual Neuro Assessment.

## 2021-12-27 ENCOUNTER — APPOINTMENT (OUTPATIENT)
Dept: NON INVASIVE DIAGNOSTICS | Age: 51
End: 2021-12-27
Attending: PSYCHIATRY & NEUROLOGY
Payer: MEDICAID

## 2021-12-27 LAB
ECHO AO ROOT DIAM: 3 CM
ECHO AO ROOT INDEX: 1.69 CM/M2
ECHO AV AREA PEAK VELOCITY: 1.8 CM2
ECHO AV AREA PEAK VELOCITY: 1.8 CM2
ECHO AV PEAK GRADIENT: 6 MMHG
ECHO AV PEAK VELOCITY: 1.2 M/S
ECHO AV VELOCITY RATIO: 0.83
ECHO LA DIAMETER INDEX: 1.97 CM/M2
ECHO LA DIAMETER: 3.5 CM
ECHO LA TO AORTIC ROOT RATIO: 1.17
ECHO LA VOL 2C: 38 ML (ref 22–52)
ECHO LA VOL 4C: 36 ML (ref 22–52)
ECHO LA VOLUME AREA LENGTH: 39 ML
ECHO LA VOLUME INDEX A2C: 21 ML/M2 (ref 16–34)
ECHO LA VOLUME INDEX A4C: 20 ML/M2 (ref 16–34)
ECHO LA VOLUME INDEX AREA LENGTH: 22 ML/M2 (ref 16–34)
ECHO LV E' LATERAL VELOCITY: 8 CM/S
ECHO LV E' SEPTAL VELOCITY: 6 CM/S
ECHO LV FRACTIONAL SHORTENING: 38 % (ref 28–44)
ECHO LV INTERNAL DIMENSION DIASTOLE INDEX: 2.08 CM/M2
ECHO LV INTERNAL DIMENSION DIASTOLIC: 3.7 CM (ref 3.9–5.3)
ECHO LV INTERNAL DIMENSION SYSTOLIC INDEX: 1.29 CM/M2
ECHO LV INTERNAL DIMENSION SYSTOLIC: 2.3 CM
ECHO LV IVSD: 0.8 CM (ref 0.6–0.9)
ECHO LV MASS 2D: 82.3 G (ref 67–162)
ECHO LV MASS INDEX 2D: 46.2 G/M2 (ref 43–95)
ECHO LV POSTERIOR WALL DIASTOLIC: 0.8 CM (ref 0.6–0.9)
ECHO LV RELATIVE WALL THICKNESS RATIO: 0.43
ECHO LVOT AREA: 2.3 CM2
ECHO LVOT DIAM: 1.7 CM
ECHO LVOT PEAK GRADIENT: 4 MMHG
ECHO LVOT PEAK VELOCITY: 1 M/S
ECHO MV A VELOCITY: 0.75 M/S
ECHO MV AREA PHT: 4.5 CM2
ECHO MV E DECELERATION TIME (DT): 169.9 MS
ECHO MV E VELOCITY: 0.54 M/S
ECHO MV E/A RATIO: 0.72
ECHO MV E/E' LATERAL: 6.75
ECHO MV E/E' RATIO (AVERAGED): 7.88
ECHO MV E/E' SEPTAL: 9
ECHO MV PRESSURE HALF TIME (PHT): 49.3 MS
ECHO PV MAX VELOCITY: 0.6 M/S
ECHO PV PEAK GRADIENT: 1 MMHG
ECHO RV FREE WALL PEAK S': 9 CM/S
ECHO RV INTERNAL DIMENSION: 1.9 CM
ECHO RV TAPSE: 1.5 CM (ref 1.5–2)
ECHO TV REGURGITANT MAX VELOCITY: 2.23 M/S
ECHO TV REGURGITANT PEAK GRADIENT: 20 MMHG
LEFT CCA DIST DIAS: 20.4 CM/S
LEFT CCA DIST SYS: 68.3 CM/S
LEFT CCA PROX DIAS: 20.4 CM/S
LEFT CCA PROX SYS: 94.1 CM/S
LEFT ECA DIAS: 6.9 CM/S
LEFT ECA SYS: 57.2 CM/S
LEFT ICA DIST DIAS: 26.5 CM/S
LEFT ICA DIST SYS: 68.3 CM/S
LEFT ICA MID DIAS: 40 CM/S
LEFT ICA MID SYS: 86.7 CM/S
LEFT ICA PROX DIAS: 31.4 CM/S
LEFT ICA PROX SYS: 75.6 CM/S
LEFT ICA/CCA SYS: 1.3
LEFT VERTEBRAL DIAS: 14.7 CM/S
LEFT VERTEBRAL SYS: 44 CM/S
RIGHT CCA DIST DIAS: 18.1 CM/S
RIGHT CCA DIST SYS: 59.8 CM/S
RIGHT CCA PROX DIAS: 22 CM/S
RIGHT CCA PROX SYS: 72.9 CM/S
RIGHT ECA DIAS: 7.7 CM/S
RIGHT ECA SYS: 58.5 CM/S
RIGHT ICA DIST DIAS: 27.2 CM/S
RIGHT ICA DIST SYS: 71.5 CM/S
RIGHT ICA MID DIAS: 36.4 CM/S
RIGHT ICA MID SYS: 78 CM/S
RIGHT ICA PROX DIAS: 14.2 CM/S
RIGHT ICA PROX SYS: 59.8 CM/S
RIGHT ICA/CCA SYS: 1.3
RIGHT VERTEBRAL DIAS: 14.1 CM/S
RIGHT VERTEBRAL SYS: 38.6 CM/S

## 2021-12-27 PROCEDURE — 99204 OFFICE O/P NEW MOD 45 MIN: CPT | Performed by: INTERNAL MEDICINE

## 2021-12-27 PROCEDURE — 74011250637 HC RX REV CODE- 250/637: Performed by: PSYCHIATRY & NEUROLOGY

## 2021-12-27 PROCEDURE — G0378 HOSPITAL OBSERVATION PER HR: HCPCS

## 2021-12-27 PROCEDURE — 74011250637 HC RX REV CODE- 250/637: Performed by: HOSPITALIST

## 2021-12-27 PROCEDURE — 96374 THER/PROPH/DIAG INJ IV PUSH: CPT

## 2021-12-27 RX ADMIN — TOPIRAMATE 50 MG: 25 TABLET, FILM COATED ORAL at 17:00

## 2021-12-27 RX ADMIN — ACETAMINOPHEN 650 MG: 325 TABLET ORAL at 21:27

## 2021-12-27 RX ADMIN — GABAPENTIN 600 MG: 600 TABLET, FILM COATED ORAL at 17:00

## 2021-12-27 RX ADMIN — METOPROLOL TARTRATE 25 MG: 25 TABLET, FILM COATED ORAL at 09:22

## 2021-12-27 RX ADMIN — METOPROLOL TARTRATE 25 MG: 25 TABLET, FILM COATED ORAL at 17:00

## 2021-12-27 RX ADMIN — FAMOTIDINE 20 MG: 20 TABLET ORAL at 09:22

## 2021-12-27 RX ADMIN — BACLOFEN 10 MG: 10 TABLET ORAL at 09:22

## 2021-12-27 RX ADMIN — ACETAMINOPHEN 650 MG: 325 TABLET ORAL at 00:30

## 2021-12-27 RX ADMIN — GABAPENTIN 600 MG: 600 TABLET, FILM COATED ORAL at 09:22

## 2021-12-27 RX ADMIN — TOPIRAMATE 50 MG: 25 TABLET, FILM COATED ORAL at 09:22

## 2021-12-27 RX ADMIN — FAMOTIDINE 20 MG: 20 TABLET ORAL at 21:26

## 2021-12-27 RX ADMIN — FLUOXETINE 20 MG: 20 CAPSULE ORAL at 09:22

## 2021-12-27 RX ADMIN — BACLOFEN 10 MG: 10 TABLET ORAL at 17:00

## 2021-12-27 RX ADMIN — BUTALBITAL, ACETAMINOPHEN, AND CAFFEINE 1 TABLET: 50; 325; 40 TABLET ORAL at 17:00

## 2021-12-27 RX ADMIN — BACLOFEN 10 MG: 10 TABLET ORAL at 21:26

## 2021-12-27 RX ADMIN — GABAPENTIN 600 MG: 600 TABLET, FILM COATED ORAL at 21:26

## 2021-12-27 NOTE — PROGRESS NOTES
Transition of Care Plan: Home with J[facility] De [facility] V[facility]    RUR: N/A    PCP F/U: Fouzia Power, KENDRA    Disposition: Home with home health    Transportation: family    3310: Spoke with NP that patient may discharge today. Reached out to [facility] to see if Davonte Ramsey had been approved. 1409: [facility] approved. AVS updated. Will continue to follow.      Faby Marquez RN, CRM

## 2021-12-27 NOTE — PROGRESS NOTES
[address] [facility] Adult  Hospitalist Group                                                                                          Hospitalist Progress Note  Meredith Reid NP  Answering service: [phone]  OR [phone] from in house phone        Date of Service:  2021  NAME:  Farhan Pringle  :  1970  MRN:  [id number]      Admission Summary:     Patient admitted for further evaluation after she presented with speech difficulty, headache and some numbness and tingling of the left hand and feet. In the ED, CT head was negative for acute pathology. Subsequent MRI of the brain shows no evidence of acute infarction or other acute finding. Echo and carotic Doppler pending. Neurology evaluating the patient. Interval history / Subjective:       Patient seen on morning rounds.   With improvements, feeling better today     Assessment & Plan:     Migraine  Patient presented with headache along with paresthesia of the left side and difficulty with speech  MRI of the brain shows no acute pathology  And evaluated by neurology, likely migraine symptoms  Doppler of carotid arteries no significant stenosis  Continuing Noreene Journey was done today, was read with PFO  Consulting with cardiology        Left shoulder/chest pain  Evaluation this appears to be musculoskeletal  EKG was unremarkable  Shoulder radiograph with degenerative changes  Continuing baclofen      Hypertension  Pressure currently controlled  Continuing metoprolol    Anxiety disorder  Stable  Continue alprazolam as needed  Continuing fluoxetine      Cerebral palsy  Stable    Code status: Full  DVT prophylaxis: SCDs    Care Plan discussed with: Patient/Family     Anticipated Disposition: Home w/Family     Anticipated Discharge: Depending on cardiology consult, later today versus tomorrow     Hospital Problems  Date Reviewed: 2017          Codes Class Noted POA    * (Principal) Dysarthria ICD-10-CM: R47.1  ICD-9-CM: 784.51  2021 Unknown        TIA (transient ischemic attack) ICD-10-CM: G45.9  ICD-9-CM: 435.9  12/23/2021 Unknown                Review of Systems:   A comprehensive review of systems was negative except for that written in the HPI. Vital Signs:    Last 24hrs VS reviewed since prior progress note. Most recent are:  Visit Vitals  /83   Pulse 72   Temp 98 °F (36.7 °C)   Resp 16   Ht 5' 2\" (1.575 m)   Wt 76.7 kg (169 lb)   SpO2 99%   BMI 30.91 kg/m²       No intake or output data in the 24 hours ending 12/27/21 1529     Physical Examination:     I had a face to face encounter with this patient and independently examined them on 12/27/2021 as outlined below:          Constitutional:  No acute distress, cooperative, pleasant    ENT:  Oral mucosa moist, oropharynx benign. Resp:  CTA bilaterally. No wheezing/rhonchi/rales. No accessory muscle use   CV:  Regular rhythm, normal rate, no murmurs, gallops, rubs, tenderness over the left chest    GI:  Soft, non distended, non tender. normoactive bowel sounds, no hepatosplenomegaly     Musculoskeletal:  No edema, warm, 2+ pulses throughout    Neurologic:  Moves all extremities. AAOx3, CN II-XII reviewed, some decreased sensation left side of the face, equal strength all extremities            Data Review:    Review and/or order of clinical lab test      Labs:     No results for input(s): WBC, HGB, HCT, PLT, HGBEXT, HCTEXT, PLTEXT, HGBEXT, HCTEXT, PLTEXT in the last 72 hours. No results for input(s): NA, K, CL, CO2, BUN, CREA, GLU, CA, MG, PHOS, URICA in the last 72 hours. No results for input(s): ALT, AP, TBIL, TBILI, TP, ALB, GLOB, GGT, AML, LPSE in the last 72 hours. No lab exists for component: SGOT, GPT, AMYP, HLPSE  No results for input(s): INR, PTP, APTT, INREXT, INREXT in the last 72 hours. No results for input(s): FE, TIBC, PSAT, FERR in the last 72 hours.    Lab Results   Component Value Date/Time    Folate >19.9 01/13/2015 02:30 PM      No results for input(s): PH, PCO2, PO2 in the last 72 hours. No results for input(s): CPK, CKNDX, TROIQ in the last 72 hours.     No lab exists for component: CPKMB  Lab Results   Component Value Date/Time    Cholesterol, total 135 12/24/2021 04:13 AM    HDL Cholesterol 54 12/24/2021 04:13 AM    LDL, calculated 51.4 12/24/2021 04:13 AM    Triglyceride 148 12/24/2021 04:13 AM    CHOL/HDL Ratio 2.5 12/24/2021 04:13 AM     Lab Results   Component Value Date/Time    Glucose  02/19/2013 10:42 AM    Glucose POC 97 10/27/2011 10:43 AM    Glucose POC 80 11/22/2010 03:43 PM     Lab Results   Component Value Date/Time    Color YELLOW/STRAW 12/23/2021 02:53 PM    Appearance CLEAR 12/23/2021 02:53 PM    Specific gravity 1.008 12/23/2021 02:53 PM    pH (UA) 7.0 12/23/2021 02:53 PM    Protein Negative 12/23/2021 02:53 PM    Glucose Negative 12/23/2021 02:53 PM    Ketone Negative 12/23/2021 02:53 PM    Bilirubin Negative 12/23/2021 02:53 PM    Urobilinogen 0.2 12/23/2021 02:53 PM    Nitrites Negative 12/23/2021 02:53 PM    Leukocyte Esterase Negative 12/23/2021 02:53 PM         Medications Reviewed:     Current Facility-Administered Medications   Medication Dose Route Frequency    topiramate (TOPAMAX) tablet 50 mg  50 mg Oral BID    ibuprofen (MOTRIN) tablet 600 mg  600 mg Oral Q8H PRN    butalbital-acetaminophen-caffeine (FIORICET, ESGIC) -40 mg per tablet 1 Tablet  1 Tablet Oral Q12H PRN    ALPRAZolam (XANAX) tablet 0.5 mg  0.5 mg Oral DAILY PRN    FLUoxetine (PROzac) capsule 20 mg  20 mg Oral DAILY    metoprolol tartrate (LOPRESSOR) tablet 25 mg  25 mg Oral BID    gabapentin (NEURONTIN) tablet 600 mg  600 mg Oral TID    famotidine (PEPCID) tablet 20 mg  20 mg Oral BID    baclofen (LIORESAL) tablet 10 mg  10 mg Oral TID    acetaminophen (TYLENOL) tablet 650 mg  650 mg Oral Q4H PRN    Or    acetaminophen (TYLENOL) solution 650 mg  650 mg Per NG tube Q4H PRN    Or    acetaminophen (TYLENOL) suppository 650 mg  650 mg Rectal Q4H PRN     ______________________________________________________________________  EXPECTED LENGTH OF STAY: - - -  ACTUAL LENGTH OF STAY:          0                 Trino Silverio NP

## 2021-12-27 NOTE — PROGRESS NOTES
Bedside shift change report given to [address] (oncoming nurse) by Leana Billy RN (offgoing nurse). Report included the following information SBAR, Kardex, ED Summary, OR Summary, Procedure Summary, Intake/Output, MAR, Cardiac Rhythm NSR and Dual Neuro Assessment.

## 2021-12-27 NOTE — CONSULTS
Dr. Adeola Amor. [phone]            Cardiology structural heart disease consult/Progress Note      Requesting/referring provider: Deana Salmon NP, Dr. Eros Allred    Reason for Consult: PFO in the setting of migraine    Assessment/Plan:  1. 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 History of migraine with aura with recurrent headaches during current hospitalization  3. History of cerebral palsy  4. History of asthma  5. Hypertension  6. Incidentally detected small to intermediate sized right to left intracardiac shunt likely at atrial level from a small PFO    Ms. Maria A Diez is seen at the request of Dr. Eros Allred. She was admitted for initially suspected CVA but MRI did not demonstrate any evidence of acute infarct. 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 Echocardiogram is consistent with likely PFO with small to moderate sized right-to-left atrial level shunt. While PFO could be related to underlying migraine, in the absence of recent or prior strokes, there is no absolute indication to consider PFO closure at this time. While a PEDRO can be performed to confirm the size and anatomy of PFO, it is unlikely to  at this time. If she does have refractory migraines and other recurrent neurological events, the PFO can be revisited for potential closure options. Investigations ordered    []    High complexity decision making was performed  []    Patient is at high-risk of decompensation with multiple organ involvement  []    Complex/difficult social determinants of health outcomes    Investigations personally reviewed and interpreted  Echocardiogram performed on December 26, 2021 was reviewed personally. It shows normal left and right-sided chamber size. Intermittently small right to left saline bubble shunt was noted.   8 bubbles were noted instantaneously in left ventricle at its peak consistent with at best moderate size shunt. Color Doppler did not demonstrate any large ASD. No negative saline contrast was noted in right atrium suggesting against ASD. Investigations reviewed    HPI: Pepe Eldridge, a 52y.o. year-old who is seen for evaluation  and management of PFO. She has history of cerebral palsy and migraines. She planted to the hospital with acute onset paresthesias and speech deficit and was initially felt to have a stroke. Subsequently MRI of the brain demonstrated no evidence of acute pathology or diffusion-weighted imaging abnormalities suggesting stroke. Additional stroke work-up including carotid Dopplers demonstrated no stenosis. Echo was performed as a part of stroke work-up and demonstrated possible PFO. Nonetheless overall neurological evaluation is more consistent with neuro plegic migraine rather than true DWI negative stroke. She has no history of recurrent persistent hypoxemia. She  has a past medical history of Asthma (4/29/2010), Bronchitis, CAD (coronary artery disease), Cerebral palsy ([address] (4/29/2010), Cerebral palsy ([address] CP (cerebral palsy) ([address] Endocrine disease, Goiter, Hypertension, Ill-defined condition, Lumbar disc herniation (4/29/2010), Migraine, Neurological disorder, Other ill-defined conditions(799.89), Other ill-defined conditions(799.89), Psychiatric disorder, Sinus tachycardia (5/23/2014), and Thyroid disease. She has no past medical history of History of abuse or Trauma. Review of system:Patient reports no dyspnea/PND/Orthpnea/CP. She reports no cough/fever/abdominal pain. All other systems negative except as above.    Family History   Problem Relation Age of Onset   Flora Stabs Asthma Mother     Hypertension Mother     Diabetes Mother     Cancer Father         prostate    Asthma Daughter     Asthma Daughter     Stroke Maternal Grandfather     Cancer Paternal Grandmother         throat      Social History     Socioeconomic History    Marital status: LEGALLY  Tobacco Use    Smoking status: Never Smoker    Smokeless tobacco: Never Used   Substance and Sexual Activity    Alcohol use: Yes     Comment: social    Drug use: No    Sexual activity: Yes     Partners: Male     Birth control/protection: Surgical      PE  Vitals:    12/27/21 0600 12/27/21 0845 12/27/21 1010 12/27/21 1433   BP: 121/74 121/74 110/74 119/83   Pulse: 73  82 72   Resp: 19  14 16   Temp: 98.1 °F (36.7 °C)  98 °F (36.7 °C) 98 °F (36.7 °C)   SpO2:   99% 99%   Weight:  169 lb (76.7 kg)     Height:  5' 2\" (1.575 m)      Body mass index is 30.91 kg/m². General:    Alert, cooperative, no distress. Psychiatric:    Normal Mood and affect    Eye/ENT:      Pupils equal, No asymmetry, Conjunctival pink. Able to hear voice at normal amplitude   Lungs:      Visibly symmetric chest expansion, No palpable tenderness. Clear to auscultation bilaterally. Heart[de-identified]    Regular rate and rhythm, S1, S2 normal, no murmur, click, rub or gallop. No JVD, Normal palpable peripheral pulses. No cyanosis   Abdomen:     Soft, non-tender. Bowel sounds normal. No masses,  No      organomegaly. Extremities:   Extremities normal, atraumatic, no edema. Neurologic:   CN II-XII grossly intact.  No gross focal deficits           Recent Labs:  Lab Results   Component Value Date/Time    Cholesterol, total 135 12/24/2021 04:13 AM    HDL Cholesterol 54 12/24/2021 04:13 AM    LDL, calculated 51.4 12/24/2021 04:13 AM    Triglyceride 148 12/24/2021 04:13 AM    CHOL/HDL Ratio 2.5 12/24/2021 04:13 AM     Lab Results   Component Value Date/Time    Creatinine 0.87 12/23/2021 02:56 PM     Lab Results   Component Value Date/Time    BUN 12 12/23/2021 02:56 PM     Lab Results   Component Value Date/Time    Potassium 3.8 12/23/2021 02:56 PM     Lab Results   Component Value Date/Time    Hemoglobin A1c 5.3 12/24/2021 04:13 AM     Lab Results   Component Value Date/Time    Hemoglobin (POC) 12.2 02/19/2013 10:43 AM    HGB 13.8 12/23/2021 02:56 PM Lab Results   Component Value Date/Time    PLATELET 412 42/71/0492 02:56 PM       Reviewed:  Past Medical History:   Diagnosis Date    Asthma 4/29/2010    Bronchitis     CAD (coronary artery disease)     Sinus Tach    Cerebral palsy ([address] 4/29/2010    Cerebral palsy (HCC)     CP (cerebral palsy) (HCC)     Endocrine disease     Thyroid goiter    Goiter     Hypertension     Pt denies hypertension    Ill-defined condition     Plantar fascitis and heel spur    Lumbar disc herniation 4/29/2010    Migraine     Neurological disorder     cerebral palsy    Other ill-defined conditions(799.89)     pleurisy    Other ill-defined conditions(799.89)     Migraines    Psychiatric disorder     anxiety    Sinus tachycardia 5/23/2014    Thyroid disease      Social History     Tobacco Use   Smoking Status Never Smoker   Smokeless Tobacco Never Used     Social History     Substance and Sexual Activity   Alcohol Use Yes    Comment: social     Allergies   Allergen Reactions    Pcn [Penicillins] Hives     Swelling in hands      Family History   Problem Relation Age of Onset    Asthma Mother     Hypertension Mother     Diabetes Mother     Cancer Father         prostate    Asthma Daughter     Asthma Daughter     Stroke Maternal Grandfather     Cancer Paternal Grandmother         throat        Current Facility-Administered Medications   Medication Dose Route Frequency    topiramate (TOPAMAX) tablet 50 mg  50 mg Oral BID    ibuprofen (MOTRIN) tablet 600 mg  600 mg Oral Q8H PRN    butalbital-acetaminophen-caffeine (FIORICET, ESGIC) -40 mg per tablet 1 Tablet  1 Tablet Oral Q12H PRN    ALPRAZolam (XANAX) tablet 0.5 mg  0.5 mg Oral DAILY PRN    FLUoxetine (PROzac) capsule 20 mg  20 mg Oral DAILY    metoprolol tartrate (LOPRESSOR) tablet 25 mg  25 mg Oral BID    gabapentin (NEURONTIN) tablet 600 mg  600 mg Oral TID    famotidine (PEPCID) tablet 20 mg  20 mg Oral BID    baclofen (LIORESAL) tablet 10 mg 10 mg Oral TID    acetaminophen (TYLENOL) tablet 650 mg  650 mg Oral Q4H PRN    Or    acetaminophen (TYLENOL) solution 650 mg  650 mg Per NG tube Q4H PRN    Or    acetaminophen (TYLENOL) suppository 650 mg  650 mg Rectal Q4H PRN       Aquiles Pelaez MD12/27/21     ATTENTION:   This medical record was transcribed using an electronic medical records/speech recognition system. Although proofread, it may and can contain electronic, spelling and other errors. Corrections may be executed at a later time. Please feel free to contact us for any clarifications as needed.     [facility] [place]  [facility], [place], [place]. [phone]

## 2021-12-28 VITALS
SYSTOLIC BLOOD PRESSURE: 120 MMHG | RESPIRATION RATE: 17 BRPM | DIASTOLIC BLOOD PRESSURE: 64 MMHG | HEIGHT: 62 IN | TEMPERATURE: 97.6 F | WEIGHT: 169 LBS | HEART RATE: 72 BPM | BODY MASS INDEX: 31.1 KG/M2 | OXYGEN SATURATION: 98 %

## 2021-12-28 PROCEDURE — G0378 HOSPITAL OBSERVATION PER HR: HCPCS

## 2021-12-28 PROCEDURE — 74011250637 HC RX REV CODE- 250/637: Performed by: HOSPITALIST

## 2021-12-28 PROCEDURE — 74011250637 HC RX REV CODE- 250/637: Performed by: PSYCHIATRY & NEUROLOGY

## 2021-12-28 RX ORDER — BUTALBITAL, ACETAMINOPHEN AND CAFFEINE 50; 325; 40 MG/1; MG/1; MG/1
1 TABLET ORAL
Qty: 21 TABLET | Refills: 0 | Status: SHIPPED | OUTPATIENT
Start: 2021-12-28 | End: 2022-01-04

## 2021-12-28 RX ADMIN — FAMOTIDINE 20 MG: 20 TABLET ORAL at 08:06

## 2021-12-28 RX ADMIN — TOPIRAMATE 50 MG: 25 TABLET, FILM COATED ORAL at 08:06

## 2021-12-28 RX ADMIN — METOPROLOL TARTRATE 25 MG: 25 TABLET, FILM COATED ORAL at 08:06

## 2021-12-28 RX ADMIN — GABAPENTIN 600 MG: 600 TABLET, FILM COATED ORAL at 08:06

## 2021-12-28 RX ADMIN — FLUOXETINE 20 MG: 20 CAPSULE ORAL at 08:06

## 2021-12-28 RX ADMIN — BACLOFEN 10 MG: 10 TABLET ORAL at 08:06

## 2021-12-28 RX ADMIN — BUTALBITAL, ACETAMINOPHEN, AND CAFFEINE 1 TABLET: 50; 325; 40 TABLET ORAL at 06:08

## 2021-12-28 NOTE — PROGRESS NOTES

## 2021-12-28 NOTE — PROGRESS NOTES
Transition of Care Plan   RUR- observation    DISPOSITION: Home with family and [facility] home health   F/U with PCP/Specialist     Transport: Family    CM noted discharge order for patient, provided update to [employer]. No further CM needs. 10:40am: RN notified CM that patient needed transport home. Roundtrip arranged for ASAP. Max Perry M.S.JUDY.

## 2021-12-28 NOTE — PROGRESS NOTES
Hospital follow-up Telehealth PCP transitional care appointment has been scheduled with Elisha Gutiérrez NP for Wednesday, 1/5/22 at 9:00 am.  Pending patient discharge.   Feli Muir, Care Management Specialist.

## 2021-12-28 NOTE — PROGRESS NOTES
Bedside RN performed patient education and medication education. Discharge concerns initiated and discussed with patient, including clarification on \"who\" assists the patient at their home and instructions for when the home going patient should call their provider after discharge. Opportunity for questions and clarification was provided. Patient receptive to education: YES  Patient stated: I understand all my medications  Barriers to Education: none  Diagnosis Education given:  YES    Length of stay: 0  Expected Day of Discharge: 12/28/2021  Ask if they have \"Help at Home\" & add to white board?   YES    Education Day #: n/a    Medication Education Given:  NO  M in the box Medication name: n/a    Pt aware of HCAHPS survey: YES          Stroke Education documented in Patient Education: YES  Core Measures Documented in [facility]:  Risk Factors: YES  Warning signs of stroke: YES  When to Activate 911: YES  Medication Education for Risk Factors: YES  Smoking cessation if applicable: YES  Written Education Given:  YES    Discharge NIH Completed: YES  Score: 0    BRAINS: YES    Follow Up Appointment Made: NO  Date/Time if applicable: n/a

## 2021-12-28 NOTE — DISCHARGE SUMMARY
Discharge Summary       PATIENT ID: Tavon Pendleton  MRN: [id number]   YOB: 1970    DATE OF ADMISSION: 12/23/2021  2:01 PM    DATE OF DISCHARGE: 12/28/2021   PRIMARY CARE PROVIDER: Marcel Palomares NP     ATTENDING PHYSICIAN: Angelina Judge MD  DISCHARGING PROVIDER: Davina Higgins NP    To contact this individual call [phone] and ask the  to page. If unavailable ask to be transferred the Adult Hospitalist Department. CONSULTATIONS: IP CONSULT TO NEUROLOGY  IP CONSULT TO CARDIOLOGY    PROCEDURES/SURGERIES: * No surgery found *    ADMITTING DIAGNOSES & HOSPITAL COURSE:   Patient admitted for further evaluation after she presented with speech difficulty, headache and some numbness and tingling of the left hand and feet. In the ED, CT head was negative for acute pathology. Subsequent MRI of the brain shows no evidence of acute infarction or other acute finding. Genet Gonzalez DISCHARGE DIAGNOSES / PLAN:      Migraine  Patient presented with headache along with paresthesia of the left side and difficulty with speech  MRI of the brain shows no acute pathology  And evaluated by neurology, likely migraine symptoms  Doppler of carotid arteries no significant stenosis  Continuing Fioricet  echocardiogram was done, was read with PFO  Cardiology was consulted.   No further intervention at this time however may very evaluate if she has retractable migraines.           Left shoulder/chest pain  Evaluation this appears to be musculoskeletal  EKG was unremarkable  Shoulder radiograph with degenerative changes  Continuing baclofen        Hypertension  Pressure currently controlled  Continuing metoprolol     Anxiety disorder  Stable  Continue alprazolam as needed  Continuing fluoxetine        Cerebral palsy  Stable       PENDING TEST RESULTS:   At the time of discharge the following test results are still pending: na    FOLLOW UP APPOINTMENTS:    Follow-up Information     Follow up With Specialties Details Why [id number] [facility] [address], [address] [facility] [address] [facility]    Quoc Thorne NP Nurse Practitioner On 1/5/2022 Hospital follow up primary care Telehealth visit Wednesday 1/5/22 at 9:00 a.m.  [address]  [facility] [address]  [phone]             ADDITIONAL CARE RECOMMENDATIONS:     DIET: Regular Diet      ACTIVITY: Activity as tolerated    WOUND CARE: na    EQUIPMENT needed: na      DISCHARGE MEDICATIONS:  Discharge Medication List as of 12/28/2021  9:32 AM      START taking these medications    Details   butalbital-acetaminophen-caffeine (FIORICET, ESGIC) -40 mg per tablet Take 1 Tablet by mouth every six (6) hours as needed for Migraine for up to 7 days. , Normal, Disp-21 Tablet, R-0         CONTINUE these medications which have NOT CHANGED    Details   dextromethorphan-guaiFENesin (ROBITUSSIN-DM)  mg/5 mL syrup Take 10 mL by mouth every six (6) hours as needed for Cough., Normal, Disp-1 Bottle, R-0      traMADol (ULTRAM) 50 mg tablet Take 1 Tab by mouth every six (6) hours as needed for Pain. Max Daily Amount: 200 mg. Indications: Pain, Print, Disp-10 Tab, R-0      ondansetron (ZOFRAN ODT) 4 mg disintegrating tablet Take 1 Tab by mouth every eight (8) hours as needed for Nausea. , Print, Disp-10 Tab, R-0      acetaminophen (TYLENOL) 325 mg tablet Take 2 Tabs by mouth every four (4) hours as needed for Pain., Print, Disp-20 Tab, R-0      ibuprofen (MOTRIN) 600 mg tablet Take 1 Tab by mouth every six (6) hours as needed for Pain., Print, Disp-20 Tab, R-0      multivitamin (ONE A DAY) tablet Take 1 Tab by mouth daily. , Historical Med      fluticasone (FLONASE) 50 mcg/actuation nasal spray 2 Sprays by Both Nostrils route daily. , Normal, Disp-1 Bottle, R-11      montelukast (SINGULAIR) 10 mg tablet Take 1 Tab by mouth daily. , Normal, Disp-30 Tab, R-0      FLUoxetine (PROZAC) 20 mg tablet Take 1 Tab by mouth daily. , Normal, Disp-30 Tab, R-2      albuterol (PROVENTIL VENTOLIN) 2.5 mg /3 mL (0.083 %) nebulizer solution 3 mL by Nebulization route every four (4) hours as needed for Wheezing., Normal, Disp-50 Each, R-0      gabapentin (NEURONTIN) 600 mg tablet Take 1 Tab by mouth three (3) times daily. , Normal, Disp-90 Tab, R-3      baclofen (LIORESAL) 10 mg tablet Take 1 Tab by mouth three (3) times daily. , Normal, Disp-90 Tab, R-3      metoprolol tartrate (LOPRESSOR) 25 mg tablet Take 1 Tab by mouth two (2) times a day., Normal, Disp-60 Tab, R-1      SUMAtriptan (IMITREX) 100 mg tablet Take 1 Tab by mouth once as needed for Migraine., Normal, Disp-10 Tab, R-4      topiramate (TOPAMAX) 50 mg tablet Take 1 Tab by mouth daily. , Normal, Disp-30 Tab, R-3      albuterol sulfate (PROAIR RESPICLICK) 90 mcg/actuation aepb Take 2 Puffs by inhalation every four (4) hours. , Print, Disp-1 Inhaler, R-0      Nebulizer & Compressor machine Mask and tubing, Print, Disp-1 Each, R-0      albuterol (PROVENTIL HFA, VENTOLIN HFA, PROAIR HFA) 90 mcg/actuation inhaler Take 1-2 Puffs by inhalation every four (4) hours as needed for Wheezing., Normal, Disp-1 Inhaler, R-1      ranitidine (ZANTAC) 150 mg tablet Take 1 Tab by mouth two (2) times a day., Normal, Disp-60 Tab, R-1      Ferrous Sulfate (FLOW FE) 47.5 mg iron TbER tablet Take 1 Tab by mouth daily. Take with vitamin C, Normal, Disp-30 Tab, R-1      ALPRAZolam (XANAX) 0.5 mg tablet Take 1 tablet by mouth daily as needed for Anxiety. , Print, Disp-30 tablet, R-0         STOP taking these medications       azithromycin (ZITHROMAX Z-ANGIE) 250 mg tablet Comments:   Reason for Stopping:         butalbital-acetaminophen-caff (FIORICET) -40 mg per capsule Comments:   Reason for Stopping:                 NOTIFY YOUR PHYSICIAN FOR ANY OF THE FOLLOWING:   Fever over 101 degrees for 24 hours.    Chest pain, shortness of breath, fever, chills, nausea, vomiting, diarrhea, change in mentation, falling, weakness, bleeding. Severe pain or pain not relieved by medications. Or, any other signs or symptoms that you may have questions about.     DISPOSITION:    Home With:  xx OT x PT x HH  RN       Long term SNF/Inpatient Rehab    Independent/assisted living    Hospice    Other:       PATIENT CONDITION AT DISCHARGE:     Functional status    Poor     Deconditioned    x Independent      Cognition   x  Lucid     Forgetful     Dementia      Catheters/lines (plus indication)    Bowman     PICC     PEG    x None      Code status    x Full code     DNR      PHYSICAL EXAMINATION AT DISCHARGE:   Refer to Progress Note      CHRONIC MEDICAL DIAGNOSES:  Problem List as of 12/28/2021 Date Reviewed: 6/27/2017          Codes Class Noted - Resolved    * (Principal) Dysarthria ICD-10-CM: R47.1  ICD-9-CM: 784.51  12/23/2021 - Present        TIA (transient ischemic attack) ICD-10-CM: G45.9  ICD-9-CM: 435.9  12/23/2021 - Present        Female hirsutism ICD-10-CM: L68.0  ICD-9-CM: 704.1  2/23/2015 - Present        Sinus tachycardia ICD-10-CM: R00.0  ICD-9-CM: 427.89  5/23/2014 - Present        Thyroid nodule ICD-10-CM: E04.1  ICD-9-CM: 241.0  4/8/2014 - Present        Endometriosis ICD-10-CM: N80.9  ICD-9-CM: 617.9  3/14/2014 - Present        Migraines ICD-10-CM: R48.470  ICD-9-CM: 346.90  6/1/2012 - Present        Muscle spasm ICD-10-CM: D42.197  ICD-9-CM: 728.85  8/10/2010 - Present        Asthma ICD-10-CM: J45.909  ICD-9-CM: 493.90  4/29/2010 - Present        Lumbar disc herniation ICD-10-CM: M51.26  ICD-9-CM: 722.10  4/29/2010 - Present        Cerebral palsy ([address] ICD-10-CM: G80.9  ICD-9-CM: 343.9  4/29/2010 - Present              Greater than 30 minutes were spent with the patient on counseling and coordination of care    Signed:   Joselin Estes NP  12/28/2021  11:35 AM

## 2021-12-28 NOTE — PROGRESS NOTES
Problem: Patient Education: Go to Patient Education Activity  Goal: Patient/Family Education  Outcome: Resolved/Met     Problem: TIA/CVA Stroke: 0-24 hours  Goal: Off Pathway (Use only if patient is Off Pathway)  Outcome: Resolved/Met  Goal: Activity/Safety  Outcome: Resolved/Met  Goal: Consults, if ordered  Outcome: Resolved/Met  Goal: Diagnostic Test/Procedures  Outcome: Resolved/Met  Goal: Nutrition/Diet  Outcome: Resolved/Met  Goal: Discharge Planning  Outcome: Resolved/Met  Goal: Medications  Outcome: Resolved/Met  Goal: Respiratory  Outcome: Resolved/Met  Goal: Treatments/Interventions/Procedures  Outcome: Resolved/Met  Goal: Minimize risk of bleeding post-thrombolytic infusion  Outcome: Resolved/Met  Goal: Monitor for complications post-thrombolytic infusion  Outcome: Resolved/Met  Goal: Psychosocial  Outcome: Resolved/Met  Goal: *Hemodynamically stable  Outcome: Resolved/Met  Goal: *Neurologically stable  Description: Absence of additional neurological deficits    Outcome: Resolved/Met  Goal: *Verbalizes anxiety and depression are reduced or absent  Outcome: Resolved/Met  Goal: *Absence of Signs of Aspiration on Current Diet  Outcome: Resolved/Met  Goal: *Absence of deep venous thrombosis signs and symptoms(Stroke Metric)  Outcome: Resolved/Met  Goal: *Ability to perform ADLs and demonstrates progressive mobility and function  Outcome: Resolved/Met  Goal: *Stroke education started(Stroke Metric)  Outcome: Resolved/Met  Goal: *Dysphagia screen performed(Stroke Metric)  Outcome: Resolved/Met  Goal: *Rehab consulted(Stroke Metric)  Outcome: Resolved/Met     Problem: TIA/CVA Stroke: Day 2 Until Discharge  Goal: Off Pathway (Use only if patient is Off Pathway)  Outcome: Resolved/Met  Goal: Activity/Safety  Outcome: Resolved/Met  Goal: Diagnostic Test/Procedures  Outcome: Resolved/Met  Goal: Nutrition/Diet  Outcome: Resolved/Met  Goal: Discharge Planning  12/28/2021 0929 by Consuelo Thompson RN  Outcome: Resolved/Met  12/28/2021 0928 by Mitchell Quintero RN  Outcome: Progressing Towards Goal  Goal: Medications  Outcome: Resolved/Met  Goal: Respiratory  Outcome: Resolved/Met  Goal: Treatments/Interventions/Procedures  Outcome: Resolved/Met  Goal: Psychosocial  Outcome: Resolved/Met  Goal: *Verbalizes anxiety and depression are reduced or absent  Outcome: Resolved/Met  Goal: *Absence of aspiration  Outcome: Resolved/Met  Goal: *Absence of deep venous thrombosis signs and symptoms(Stroke Metric)  Outcome: Resolved/Met  Goal: *Optimal pain control at patient's stated goal  Outcome: Resolved/Met  Goal: *Tolerating diet  Outcome: Resolved/Met  Goal: *Ability to perform ADLs and demonstrates progressive mobility and function  Outcome: Resolved/Met  Goal: *Stroke education continued(Stroke Metric)  Outcome: Resolved/Met     Problem: Ischemic Stroke: Discharge Outcomes  Goal: *Verbalizes anxiety and depression are reduced or absent  Outcome: Resolved/Met  Goal: *Verbalize understanding of risk factor modification(Stroke Metric)  Outcome: Resolved/Met  Goal: *Hemodynamically stable  Outcome: Resolved/Met  Goal: *Absence of aspiration pneumonia  Outcome: Resolved/Met  Goal: *Aware of needed dietary changes  Outcome: Resolved/Met  Goal: *Verbalize understanding of prescribed medications including anti-coagulants, anti-lipid, and/or anti-platelets(Stroke Metric)  Outcome: Resolved/Met  Goal: *Tolerating diet  Outcome: Resolved/Met  Goal: *Aware of follow-up diagnostics related to anticoagulants  Outcome: Resolved/Met  Goal: *Ability to perform ADLs and demonstrates progressive mobility and function  Outcome: Resolved/Met  Goal: *Absence of DVT(Stroke Metric)  Outcome: Resolved/Met  Goal: *Absence of aspiration  Outcome: Resolved/Met  Goal: *Optimal pain control at patient's stated goal  Outcome: Resolved/Met  Goal: *Home safety concerns addressed  Outcome: Resolved/Met  Goal: *Describes available resources and support systems  Outcome: Resolved/Met  Goal: *Verbalizes understanding of activation of EMS(911) for stroke symptoms(Stroke Metric)  Outcome: Resolved/Met  Goal: *Understands and describes signs and symptoms to report to providers(Stroke Metric)  Outcome: Resolved/Met  Goal: *Neurolgocially stable (absence of additional neurological deficits)  Outcome: Resolved/Met  Goal: *Verbalizes importance of follow-up with primary care physician(Stroke Metric)  Outcome: Resolved/Met  Goal: *Smoking cessation discussed,if applicable(Stroke Metric)  Outcome: Resolved/Met  Goal: *Depression screening completed(Stroke Metric)  Outcome: Resolved/Met     Problem: Patient Education: Go to Patient Education Activity  Goal: Patient/Family Education  Outcome: Resolved/Met     Problem: Patient Education: Go to Patient Education Activity  Goal: Patient/Family Education  Outcome: Resolved/Met     Problem: Falls - Risk of  Goal: *Absence of Falls  Description: Document Johana Fall Risk and appropriate interventions in the flowsheet.   Outcome: Resolved/Met     Problem: Patient Education: Go to Patient Education Activity  Goal: Patient/Family Education  Outcome: Resolved/Met

## 2021-12-28 NOTE — PROGRESS NOTES
I have reviewed discharge instructions with the patient. The patient verbalized understanding. spoke with peds ED

## 2021-12-28 NOTE — DISCHARGE INSTRUCTIONS
Discharge Instructions       PATIENT ID: Pepe Eldridge  MRN: [id number]   YOB: 1970    DATE OF ADMISSION: 12/23/2021  2:01 PM    DATE OF DISCHARGE: 12/28/2021    PRIMARY CARE PROVIDER: Prem Rey NP     ATTENDING PHYSICIAN: Ankit Funk MD  DISCHARGING PROVIDER: Mykel So NP    To contact this individual call [phone] and ask the  to page. If unavailable ask to be transferred the Adult Hospitalist Department. DISCHARGE DIAGNOSES migraine    CONSULTATIONS: IP CONSULT TO NEUROLOGY  IP CONSULT TO CARDIOLOGY    PROCEDURES/SURGERIES: * No surgery found *    PENDING TEST RESULTS:   At the time of discharge the following test results are still pending: na    FOLLOW UP APPOINTMENTS:   Follow-up Information     Follow up With Specialties Details Why Contact [address], [address] [address] [address]    Prem Rey NP Nurse Practitioner   [address] [facility]  [phone]      Prem Rey NP Nurse Practitioner In 1 week  [address] [facility]  [phone]             ADDITIONAL CARE RECOMMENDATIONS: follow up with cardiology, Dr Spenser Flores, if refractory maigrains to reevauate PFO    DIET: Regular Diet      ACTIVITY: Activity as tolerated    WOUND CARE: na    EQUIPMENT needed: na      DISCHARGE MEDICATIONS:   See Medication Reconciliation Form    · It is important that you take the medication exactly as they are prescribed. · Keep your medication in the bottles provided by the pharmacist and keep a list of the medication names, dosages, and times to be taken in your wallet. · Do not take other medications without consulting your doctor. NOTIFY YOUR PHYSICIAN FOR ANY OF THE FOLLOWING:   Fever over 101 degrees for 24 hours.    Chest pain, shortness of breath, fever, chills, nausea, vomiting, diarrhea, change in mentation, falling, weakness, bleeding. Severe pain or pain not relieved by medications. Or, any other signs or symptoms that you may have questions about.       DISPOSITION:    Home With:  x OT xx PT x HH  RN       SNF/Inpatient Rehab/LTAC    Independent/assisted living    Hospice    Other:         Signed:   Pretty Willingham NP  12/28/2021  8:46 AM

## 2021-12-28 NOTE — PROGRESS NOTES
Problem: TIA/CVA Stroke: Day 2 Until Discharge  Goal: Discharge Planning  Outcome: Progressing Towards Goal

## 2022-03-19 PROBLEM — R47.1 DYSARTHRIA: Status: ACTIVE | Noted: 2021-12-23

## 2022-03-20 PROBLEM — G45.9 TIA (TRANSIENT ISCHEMIC ATTACK): Status: ACTIVE | Noted: 2021-12-23

## 2022-06-09 ENCOUNTER — HOSPITAL ENCOUNTER (EMERGENCY)
Age: 52
Discharge: HOME | End: 2022-06-09
Attending: EMERGENCY MEDICINE
Payer: MEDICAID

## 2022-06-09 ENCOUNTER — APPOINTMENT (OUTPATIENT)
Dept: GENERAL RADIOLOGY | Age: 52
End: 2022-06-09
Attending: EMERGENCY MEDICINE
Payer: MEDICAID

## 2022-06-09 VITALS
SYSTOLIC BLOOD PRESSURE: 115 MMHG | BODY MASS INDEX: 36.25 KG/M2 | HEIGHT: 61 IN | RESPIRATION RATE: 16 BRPM | TEMPERATURE: 98.5 F | WEIGHT: 192 LBS | HEART RATE: 79 BPM | DIASTOLIC BLOOD PRESSURE: 70 MMHG | OXYGEN SATURATION: 100 %

## 2022-06-09 DIAGNOSIS — S93.491A SPRAIN OF ANTERIOR TALOFIBULAR LIGAMENT OF RIGHT ANKLE, INITIAL ENCOUNTER: Primary | ICD-10-CM

## 2022-06-09 PROCEDURE — 74011250637 HC RX REV CODE- 250/637: Performed by: EMERGENCY MEDICINE

## 2022-06-09 PROCEDURE — 99283 EMERGENCY DEPT VISIT LOW MDM: CPT

## 2022-06-09 PROCEDURE — 73590 X-RAY EXAM OF LOWER LEG: CPT

## 2022-06-09 PROCEDURE — 73610 X-RAY EXAM OF ANKLE: CPT

## 2022-06-09 RX ORDER — IBUPROFEN 600 MG/1
600 TABLET ORAL
Status: COMPLETED | OUTPATIENT
Start: 2022-06-09 | End: 2022-06-09

## 2022-06-09 RX ORDER — IBUPROFEN 800 MG/1
800 TABLET ORAL
Qty: 20 TABLET | Refills: 0 | Status: SHIPPED | OUTPATIENT
Start: 2022-06-09 | End: 2022-06-16

## 2022-06-09 RX ADMIN — IBUPROFEN 600 MG: 600 TABLET, FILM COATED ORAL at 07:31

## 2022-06-09 NOTE — ED TRIAGE NOTES
Pt arrives via EMS for sprained ankle x 3 months. States it gave out this am and c/o pain.  Also reports lower back spasms x several days

## 2022-06-09 NOTE — ED PROVIDER NOTES
EMERGENCY DEPARTMENT HISTORY AND PHYSICAL EXAM      Date: 6/9/2022  Patient Name: Tacos Burden    History of Presenting Illness     Chief Complaint   Patient presents with    Ankle Pain    Back Pain       History Provided By: Patient    HPI: Tacos Burden, 54 y.o. female presents to the ED with cc of right ankle pain and swelling secondary to a fall prior to arrival today. Patient was coming downstairs tripped and fell, no LOC. Patient denies any other complaints. There are no other associated symptoms, patient concerns, or physical findings at this time. I reviewed the vital signs, available nursing notes, past medical history, past surgical history, family history and social history. Vital Signs:  Patient Vitals for the past 12 hrs:   Temp Pulse Resp BP SpO2   06/09/22 0725 98.5 °F (36.9 °C) 79 16 115/70 100 %     Vital signs reviewed. Current Medications:  No current facility-administered medications on file prior to encounter. Current Outpatient Medications on File Prior to Encounter   Medication Sig Dispense Refill    dextromethorphan-guaiFENesin (ROBITUSSIN-DM)  mg/5 mL syrup Take 10 mL by mouth every six (6) hours as needed for Cough. 1 Bottle 0    traMADol (ULTRAM) 50 mg tablet Take 1 Tab by mouth every six (6) hours as needed for Pain. Max Daily Amount: 200 mg. Indications: Pain 10 Tab 0    ondansetron (ZOFRAN ODT) 4 mg disintegrating tablet Take 1 Tab by mouth every eight (8) hours as needed for Nausea. 10 Tab 0    acetaminophen (TYLENOL) 325 mg tablet Take 2 Tabs by mouth every four (4) hours as needed for Pain. 20 Tab 0    ibuprofen (MOTRIN) 600 mg tablet Take 1 Tab by mouth every six (6) hours as needed for Pain. 20 Tab 0    multivitamin (ONE A DAY) tablet Take 1 Tab by mouth daily.  fluticasone (FLONASE) 50 mcg/actuation nasal spray 2 Sprays by Both Nostrils route daily. 1 Bottle 11    montelukast (SINGULAIR) 10 mg tablet Take 1 Tab by mouth daily.  30 Tab 0    FLUoxetine (PROZAC) 20 mg tablet Take 1 Tab by mouth daily. 30 Tab 2    albuterol (PROVENTIL VENTOLIN) 2.5 mg /3 mL (0.083 %) nebulizer solution 3 mL by Nebulization route every four (4) hours as needed for Wheezing. 50 Each 0    gabapentin (NEURONTIN) 600 mg tablet Take 1 Tab by mouth three (3) times daily. 90 Tab 3    baclofen (LIORESAL) 10 mg tablet Take 1 Tab by mouth three (3) times daily. 90 Tab 3    metoprolol tartrate (LOPRESSOR) 25 mg tablet Take 1 Tab by mouth two (2) times a day. 60 Tab 1    SUMAtriptan (IMITREX) 100 mg tablet Take 1 Tab by mouth once as needed for Migraine. 10 Tab 4    topiramate (TOPAMAX) 50 mg tablet Take 1 Tab by mouth daily. 30 Tab 3    albuterol sulfate (PROAIR RESPICLICK) 90 mcg/actuation aepb Take 2 Puffs by inhalation every four (4) hours. 1 Inhaler 0    Nebulizer & Compressor machine Mask and tubing 1 Each 0    albuterol (PROVENTIL HFA, VENTOLIN HFA, PROAIR HFA) 90 mcg/actuation inhaler Take 1-2 Puffs by inhalation every four (4) hours as needed for Wheezing. 1 Inhaler 1    ranitidine (ZANTAC) 150 mg tablet Take 1 Tab by mouth two (2) times a day. 60 Tab 1    Ferrous Sulfate (FLOW FE) 47.5 mg iron TbER tablet Take 1 Tab by mouth daily. Take with vitamin C 30 Tab 1    ALPRAZolam (XANAX) 0.5 mg tablet Take 1 tablet by mouth daily as needed for Anxiety.  30 tablet 0       Past History     Past Medical History:  Past Medical History:   Diagnosis Date    Asthma 4/29/2010    Bronchitis     CAD (coronary artery disease)     Sinus Tach    Cerebral palsy ([address] 4/29/2010    Cerebral palsy (HCC)     CP (cerebral palsy) (HCC)     Endocrine disease     Thyroid goiter    Goiter     Hypertension     Pt denies hypertension    Ill-defined condition     Plantar fascitis and heel spur    Lumbar disc herniation 4/29/2010    Migraine     Neurological disorder     cerebral palsy    Other ill-defined conditions(799.89)     pleurisy    Other ill-defined conditions(799.89) Migraines    Psychiatric disorder     anxiety    Sinus tachycardia 5/23/2014    Thyroid disease        Past Surgical History:  Past Surgical History:   Procedure Laterality Date    HX HEENT      T & A    HX HYSTERECTOMY      HX ORTHOPAEDIC      carpal tunnel release    HX ORTHOPAEDIC      tendon repair R hand    HX ORTHOPAEDIC      pin in toe of R foot    HX TUBAL LIGATION      MT LAP,TUBAL CAUTERY         Family History:  Family History   Problem Relation Age of Onset   Gregory Reyes Asthma Mother     Hypertension Mother     Diabetes Mother     Cancer Father         prostate    Asthma Daughter     Asthma Daughter     Stroke Maternal Grandfather     Cancer Paternal Grandmother         throat       Social History:  Social History     Tobacco Use    Smoking status: Never Smoker    Smokeless tobacco: Never Used   Substance Use Topics    Alcohol use: Yes     Comment: social    Drug use: No       Allergies: Allergies   Allergen Reactions    Pcn [Penicillins] Hives     Swelling in hands          Review of Systems   Review of Systems   Constitutional: Negative for fever. HENT: Negative for sore throat. Eyes: Negative for photophobia and redness. Respiratory: Negative for shortness of breath and wheezing. Cardiovascular: Negative for chest pain and leg swelling. Gastrointestinal: Negative for abdominal pain, blood in stool, nausea and vomiting. Genitourinary: Negative for difficulty urinating, dysuria, hematuria, menstrual problem and vaginal bleeding. Musculoskeletal: Positive for arthralgias, joint swelling and myalgias. Negative for back pain. Neurological: Negative for dizziness, seizures, syncope, speech difficulty, weakness, numbness and headaches. Hematological: Negative for adenopathy. Psychiatric/Behavioral: Negative for agitation, confusion and suicidal ideas. The patient is not nervous/anxious. All other systems reviewed and are negative.       Physical Exam   Physical Exam  Vitals and nursing note reviewed. Constitutional:       General: She is not in acute distress. Appearance: She is well-developed. HENT:      Head: Normocephalic and atraumatic. Mouth/Throat:      Pharynx: No oropharyngeal exudate. Eyes:      General:         Right eye: No discharge. Left eye: No discharge. Extraocular Movements: Extraocular movements intact. Conjunctiva/sclera: Conjunctivae normal.      Pupils: Pupils are equal, round, and reactive to light. Neck:      Vascular: No JVD. Cardiovascular:      Rate and Rhythm: Normal rate and regular rhythm. Heart sounds: Normal heart sounds. Pulmonary:      Effort: Pulmonary effort is normal. No respiratory distress. Breath sounds: Normal breath sounds. No wheezing. Abdominal:      General: Bowel sounds are normal. There is no distension. Palpations: Abdomen is soft. Tenderness: There is no abdominal tenderness. There is no guarding or rebound. Musculoskeletal:         General: Swelling and tenderness present. No deformity. Normal range of motion. Cervical back: Normal range of motion and neck supple. Comments: Right ankle tenderness and mild swelling on the right lateral malleolar area. Achilles tendon on the right side is intact. There is nontender palpation on the lateral upper portion of the right leg. Lymphadenopathy:      Cervical: No cervical adenopathy. Skin:     General: Skin is warm and dry. Findings: No rash. Neurological:      Mental Status: She is alert and oriented to person, place, and time. Cranial Nerves: No cranial nerve deficit. Deep Tendon Reflexes: Reflexes are normal and symmetric. Psychiatric:         Behavior: Behavior normal.         Emergency Department Course   ED Course:  Initial assessment performed. The patient's complaints have been discussed, and they are in agreement with the care plan formulated and outlined with them.  I have encouraged them to ask questions as they arise throughout their visit. EKG interpretation: (Preliminary)  Medical Decision Making:  Ankle sprain, ankle fracture, musculoskeletal pain. Critical Care Time:      Procedure:      Progress note:   Time:    Disposition:  DISCHARGED at 8:50 am,  I reviewed exam findings, diagnostic results, and clinical impression with patient. Counseled patient on diagnosis and care plan. Encouraged patient to ask questions and discussed need for follow up with primary care and to return to ED precautions. Patient expresses understanding at this time. I have reviewed discharge instructions with the patient and/or family/caregiver who verbalized understanding. The patient has been re-evaluated and is ready for discharge. Discharge instructions have been provided and explained to the patient. Ready for discharge. DISCHARGE PLAN:  1. Current Discharge Medication List        2. Follow-up Information    None       3. Return to ED if current symptoms worsen or new symptoms arise. 4. Follow up with Dmitriy Silva NP in 3-5 days. Diagnosis     Clinical Impression: No diagnosis found.

## 2022-06-09 NOTE — LETTER
[facility] EMERGENCY DEPT  [address] [facility] [id number] [phone]    Work/School Note    Date: 6/9/2022    To Whom It May concern:    Nichol Saldivar was seen and treated today in the emergency room by the following provider(s):  Attending Provider: Rashard Palma MD.      Nichol Saldivar is excused from work/school on 6/9/2022 through 6/11/2022. She is medically clear to return to work/school on 6/12/2022.      [unfilled]    Sincerely,          Kenji Benavidez RN

## 2022-10-09 ENCOUNTER — HOSPITAL ENCOUNTER (EMERGENCY)
Age: 52
Discharge: HOME | End: 2022-10-09
Attending: EMERGENCY MEDICINE
Payer: MEDICAID

## 2022-10-09 ENCOUNTER — APPOINTMENT (OUTPATIENT)
Dept: CT IMAGING | Age: 52
End: 2022-10-09
Attending: PHYSICIAN ASSISTANT
Payer: MEDICAID

## 2022-10-09 VITALS
TEMPERATURE: 99 F | DIASTOLIC BLOOD PRESSURE: 72 MMHG | RESPIRATION RATE: 18 BRPM | SYSTOLIC BLOOD PRESSURE: 116 MMHG | OXYGEN SATURATION: 98 % | WEIGHT: 183.6 LBS | HEIGHT: 62 IN | HEART RATE: 71 BPM | BODY MASS INDEX: 33.79 KG/M2

## 2022-10-09 DIAGNOSIS — R10.9 ACUTE RIGHT FLANK PAIN: Primary | ICD-10-CM

## 2022-10-09 LAB
ALBUMIN SERPL-MCNC: 3.4 G/DL (ref 3.5–5)
ALBUMIN/GLOB SERPL: 0.9 {RATIO} (ref 1.1–2.2)
ALP SERPL-CCNC: 110 U/L (ref 45–117)
ALT SERPL-CCNC: 24 U/L (ref 12–78)
ANION GAP SERPL CALC-SCNC: 9 MMOL/L (ref 5–15)
APPEARANCE UR: CLEAR
AST SERPL-CCNC: 13 U/L (ref 15–37)
BACTERIA URNS QL MICRO: NEGATIVE /HPF
BASOPHILS # BLD: 0.1 K/UL (ref 0–0.1)
BASOPHILS NFR BLD: 1 % (ref 0–1)
BILIRUB SERPL-MCNC: 0.1 MG/DL (ref 0.2–1)
BILIRUB UR QL: NEGATIVE
BUN SERPL-MCNC: 11 MG/DL (ref 6–20)
BUN/CREAT SERPL: 13 (ref 12–20)
CALCIUM SERPL-MCNC: 8.9 MG/DL (ref 8.5–10.1)
CHLORIDE SERPL-SCNC: 106 MMOL/L (ref 97–108)
CO2 SERPL-SCNC: 25 MMOL/L (ref 21–32)
COLOR UR: NORMAL
CREAT SERPL-MCNC: 0.86 MG/DL (ref 0.55–1.02)
DIFFERENTIAL METHOD BLD: ABNORMAL
EOSINOPHIL # BLD: 0.2 K/UL (ref 0–0.4)
EOSINOPHIL NFR BLD: 3 % (ref 0–7)
EPITH CASTS URNS QL MICRO: NORMAL /LPF
ERYTHROCYTE [DISTWIDTH] IN BLOOD BY AUTOMATED COUNT: 15.5 % (ref 11.5–14.5)
GLOBULIN SER CALC-MCNC: 3.7 G/DL (ref 2–4)
GLUCOSE SERPL-MCNC: 96 MG/DL (ref 65–100)
GLUCOSE UR STRIP.AUTO-MCNC: NEGATIVE MG/DL
HCT VFR BLD AUTO: 39 % (ref 35–47)
HGB BLD-MCNC: 11.8 G/DL (ref 11.5–16)
HGB UR QL STRIP: NEGATIVE
IMM GRANULOCYTES # BLD AUTO: 0 K/UL
IMM GRANULOCYTES NFR BLD AUTO: 0 %
KETONES UR QL STRIP.AUTO: NEGATIVE MG/DL
LEUKOCYTE ESTERASE UR QL STRIP.AUTO: NEGATIVE
LYMPHOCYTES # BLD: 2.3 K/UL (ref 0.8–3.5)
LYMPHOCYTES NFR BLD: 30 % (ref 12–49)
MCH RBC QN AUTO: 24.6 PG (ref 26–34)
MCHC RBC AUTO-ENTMCNC: 30.3 G/DL (ref 30–36.5)
MCV RBC AUTO: 81.3 FL (ref 80–99)
MONOCYTES # BLD: 0.6 K/UL (ref 0–1)
MONOCYTES NFR BLD: 8 % (ref 5–13)
NEUTS SEG # BLD: 4.4 K/UL (ref 1.8–8)
NEUTS SEG NFR BLD: 58 % (ref 32–75)
NITRITE UR QL STRIP.AUTO: NEGATIVE
PH UR STRIP: 7 [PH] (ref 5–8)
PLATELET # BLD AUTO: 190 K/UL (ref 150–400)
POTASSIUM SERPL-SCNC: 3.7 MMOL/L (ref 3.5–5.1)
PROT SERPL-MCNC: 7.1 G/DL (ref 6.4–8.2)
PROT UR STRIP-MCNC: NEGATIVE MG/DL
RBC # BLD AUTO: 4.8 M/UL (ref 3.8–5.2)
RBC #/AREA URNS HPF: NORMAL /HPF (ref 0–5)
RBC MORPH BLD: ABNORMAL
SODIUM SERPL-SCNC: 140 MMOL/L (ref 136–145)
SP GR UR REFRACTOMETRY: 1.01
UA: UC IF INDICATED,UAUC: NORMAL
UROBILINOGEN UR QL STRIP.AUTO: 1 EU/DL (ref 0.2–1)
WBC # BLD AUTO: 7.6 K/UL (ref 3.6–11)
WBC URNS QL MICRO: NORMAL /HPF (ref 0–4)

## 2022-10-09 PROCEDURE — 74177 CT ABD & PELVIS W/CONTRAST: CPT

## 2022-10-09 PROCEDURE — 99285 EMERGENCY DEPT VISIT HI MDM: CPT

## 2022-10-09 PROCEDURE — 74011250636 HC RX REV CODE- 250/636: Performed by: PHYSICIAN ASSISTANT

## 2022-10-09 PROCEDURE — 81001 URINALYSIS AUTO W/SCOPE: CPT

## 2022-10-09 PROCEDURE — 80053 COMPREHEN METABOLIC PANEL: CPT

## 2022-10-09 PROCEDURE — 36415 COLL VENOUS BLD VENIPUNCTURE: CPT

## 2022-10-09 PROCEDURE — 96374 THER/PROPH/DIAG INJ IV PUSH: CPT

## 2022-10-09 PROCEDURE — 85025 COMPLETE CBC W/AUTO DIFF WBC: CPT

## 2022-10-09 PROCEDURE — 74011000636 HC RX REV CODE- 636: Performed by: EMERGENCY MEDICINE

## 2022-10-09 RX ORDER — KETOROLAC TROMETHAMINE 30 MG/ML
15 INJECTION, SOLUTION INTRAMUSCULAR; INTRAVENOUS
Status: COMPLETED | OUTPATIENT
Start: 2022-10-09 | End: 2022-10-09

## 2022-10-09 RX ORDER — IBUPROFEN 600 MG/1
600 TABLET ORAL
Qty: 20 TABLET | Refills: 0 | Status: SHIPPED | OUTPATIENT
Start: 2022-10-09

## 2022-10-09 RX ORDER — ACETAMINOPHEN 325 MG/1
650 TABLET ORAL
Qty: 20 TABLET | Refills: 0 | Status: SHIPPED | OUTPATIENT
Start: 2022-10-09

## 2022-10-09 RX ADMIN — KETOROLAC TROMETHAMINE 15 MG: 30 INJECTION, SOLUTION INTRAMUSCULAR; INTRAVENOUS at 17:14

## 2022-10-09 RX ADMIN — IOPAMIDOL 100 ML: 755 INJECTION, SOLUTION INTRAVENOUS at 18:08

## 2022-10-09 NOTE — DISCHARGE INSTRUCTIONS
It was a pleasure taking care of you at [facility] Emergency Department today. We know that when you come to [facility], you are entrusting us with your health, comfort, and safety. Our physicians and nurses honor that trust, and we truly appreciate the opportunity to care for you and your loved ones. We also value our feedback. If you receive a survey about your Emergency Department experience today, please fill it out. We care about our patients' feedback, and we listen to what you have to say. Thank you!

## 2022-10-09 NOTE — ED PROVIDER NOTES
EMERGENCY DEPARTMENT HISTORY AND PHYSICAL EXAM      Date: 10/9/2022  Patient Name: Fang Guzman    History of Presenting Illness     Chief Complaint   Patient presents with    Bladder Infection     Increased urinary frequency, lower abd/pelvic pain, radiating to right flank    Abdominal Pain     History Provided By: Patient    HPI: Fang Guzman, 54 y.o. female with bronchitis, cerebral palsy, s/p hysterectomy, lumbar disc herniation, anxiety, migraines, thyroid goiter who presents via self to the ED with cc of acute moderate sharp and aching right low back pain/ Right flank/ RLQ abd pain and urinary frequency x 3 days. Tylenol without relief. Hx of similar with UTI before. No fever, chills, n/v, constipation, melena, hematochezia, vaginal bleeding/discharge, dysuria, hematuria, rash, wound, cp, sob. +vague minimal diarrhea due to eating suspect food the other which has resolved. PCP: Edilberto Haque NP    There are no other complaints, changes, or physical findings at this time. No current facility-administered medications on file prior to encounter. Current Outpatient Medications on File Prior to Encounter   Medication Sig Dispense Refill    dextromethorphan-guaiFENesin (ROBITUSSIN-DM)  mg/5 mL syrup Take 10 mL by mouth every six (6) hours as needed for Cough. 1 Bottle 0    traMADol (ULTRAM) 50 mg tablet Take 1 Tab by mouth every six (6) hours as needed for Pain. Max Daily Amount: 200 mg. Indications: Pain 10 Tab 0    ondansetron (ZOFRAN ODT) 4 mg disintegrating tablet Take 1 Tab by mouth every eight (8) hours as needed for Nausea. 10 Tab 0    [DISCONTINUED] acetaminophen (TYLENOL) 325 mg tablet Take 2 Tabs by mouth every four (4) hours as needed for Pain. 20 Tab 0    [DISCONTINUED] ibuprofen (MOTRIN) 600 mg tablet Take 1 Tab by mouth every six (6) hours as needed for Pain. 20 Tab 0    multivitamin (ONE A DAY) tablet Take 1 Tab by mouth daily.       fluticasone (FLONASE) 50 mcg/actuation nasal spray 2 Sprays by Both Nostrils route daily. 1 Bottle 11    montelukast (SINGULAIR) 10 mg tablet Take 1 Tab by mouth daily. 30 Tab 0    FLUoxetine (PROZAC) 20 mg tablet Take 1 Tab by mouth daily. 30 Tab 2    albuterol (PROVENTIL VENTOLIN) 2.5 mg /3 mL (0.083 %) nebulizer solution 3 mL by Nebulization route every four (4) hours as needed for Wheezing. 50 Each 0    gabapentin (NEURONTIN) 600 mg tablet Take 1 Tab by mouth three (3) times daily. 90 Tab 3    baclofen (LIORESAL) 10 mg tablet Take 1 Tab by mouth three (3) times daily. 90 Tab 3    metoprolol tartrate (LOPRESSOR) 25 mg tablet Take 1 Tab by mouth two (2) times a day. 60 Tab 1    SUMAtriptan (IMITREX) 100 mg tablet Take 1 Tab by mouth once as needed for Migraine. 10 Tab 4    topiramate (TOPAMAX) 50 mg tablet Take 1 Tab by mouth daily. 30 Tab 3    albuterol sulfate (PROAIR RESPICLICK) 90 mcg/actuation aepb Take 2 Puffs by inhalation every four (4) hours. 1 Inhaler 0    Nebulizer & Compressor machine Mask and tubing 1 Each 0    albuterol (PROVENTIL HFA, VENTOLIN HFA, PROAIR HFA) 90 mcg/actuation inhaler Take 1-2 Puffs by inhalation every four (4) hours as needed for Wheezing. 1 Inhaler 1    ranitidine (ZANTAC) 150 mg tablet Take 1 Tab by mouth two (2) times a day. 60 Tab 1    Ferrous Sulfate (FLOW FE) 47.5 mg iron TbER tablet Take 1 Tab by mouth daily. Take with vitamin C 30 Tab 1    ALPRAZolam (XANAX) 0.5 mg tablet Take 1 tablet by mouth daily as needed for Anxiety.  30 tablet 0     Past History     Past Medical History:  Past Medical History:   Diagnosis Date    Asthma 4/29/2010    Bronchitis     CAD (coronary artery disease)     Sinus Tach    Cerebral palsy (HCC) 4/29/2010    Cerebral palsy (HCC)     CP (cerebral palsy) (HCC)     Endocrine disease     Thyroid goiter    Goiter     Hypertension     Pt denies hypertension    Ill-defined condition     Plantar fascitis and heel spur    Lumbar disc herniation 4/29/2010    Migraine     Neurological disorder cerebral palsy    Other ill-defined conditions(799.89)     pleurisy    Other ill-defined conditions(799.89)     Migraines    Psychiatric disorder     anxiety    Sinus tachycardia 5/23/2014    Thyroid disease      Past Surgical History:  Past Surgical History:   Procedure Laterality Date    HX HEENT      T & A    HX HYSTERECTOMY      HX ORTHOPAEDIC      carpal tunnel release    HX ORTHOPAEDIC      tendon repair R hand    HX ORTHOPAEDIC      pin in toe of R foot    HX TUBAL LIGATION      CT LAP,TUBAL CAUTERY       Family History:  Family History   Problem Relation Age of Onset    Asthma Mother     Hypertension Mother     Diabetes Mother     Cancer Father         prostate    Asthma Daughter     Asthma Daughter     Stroke Maternal Grandfather     Cancer Paternal Grandmother         throat     Social History:  Social History     Tobacco Use    Smoking status: Never    Smokeless tobacco: Never   Substance Use Topics    Alcohol use: Yes     Comment: social    Drug use: No     Allergies: Allergies   Allergen Reactions    Pcn [Penicillins] Hives     Swelling in hands      Review of Systems   Review of Systems   Constitutional:  Negative for activity change, appetite change, chills, fatigue, fever and unexpected weight change. HENT: Negative. Negative for congestion, postnasal drip, rhinorrhea, sore throat, trouble swallowing and voice change. Eyes:  Negative for visual disturbance. Respiratory:  Negative for cough and shortness of breath. Cardiovascular:  Negative for chest pain and palpitations. Gastrointestinal:  Positive for abdominal pain and diarrhea. Negative for abdominal distention, anal bleeding, blood in stool, constipation, nausea, rectal pain and vomiting. Genitourinary:  Positive for flank pain and frequency. Negative for decreased urine volume, difficulty urinating, dysuria, genital sores, hematuria, menstrual problem, pelvic pain, urgency, vaginal bleeding, vaginal discharge and vaginal pain. Musculoskeletal:  Positive for back pain. Negative for arthralgias, myalgias, neck pain and neck stiffness. Skin: Negative. Negative for color change, pallor, rash and wound. Neurological:  Negative for light-headedness and headaches. Psychiatric/Behavioral: Negative. Physical Exam   Physical Exam  Vitals and nursing note reviewed. Constitutional:       General: She is not in acute distress. Appearance: She is well-developed. She is not ill-appearing, toxic-appearing or diaphoretic. HENT:      Head: Normocephalic and atraumatic. Eyes:      Conjunctiva/sclera: Conjunctivae normal.      Pupils: Pupils are equal, round, and reactive to light. Cardiovascular:      Rate and Rhythm: Normal rate and regular rhythm. Heart sounds: Normal heart sounds. Pulmonary:      Effort: Pulmonary effort is normal. No respiratory distress. Breath sounds: Normal breath sounds. No wheezing or rales. Abdominal:      General: Abdomen is protuberant. Bowel sounds are normal. There is no distension. Palpations: Abdomen is soft. Abdomen is not rigid. There is no mass. Tenderness: There is abdominal tenderness in the right lower quadrant. There is no right CVA tenderness, left CVA tenderness, guarding or rebound. Negative signs include Ornelas's sign and McBurney's sign. Musculoskeletal:         General: Normal range of motion. Cervical back: Normal range of motion. Skin:     General: Skin is warm and dry. Neurological:      Mental Status: She is alert and oriented to person, place, and time. Psychiatric:         Behavior: Behavior normal.         Thought Content:  Thought content normal.         Judgment: Judgment normal.     Diagnostic Study Results   Labs -     Recent Results (from the past 12 hour(s))   CBC WITH AUTOMATED DIFF    Collection Time: 10/09/22  5:16 PM   Result Value Ref Range    WBC 7.6 3.6 - 11.0 K/uL    RBC 4.80 3.80 - 5.20 M/uL    HGB 11.8 11.5 - 16.0 g/dL    HCT 39.0 35.0 - 47.0 %    MCV 81.3 80.0 - 99.0 FL    MCH 24.6 (L) 26.0 - 34.0 PG    MCHC 30.3 30.0 - 36.5 g/dL    RDW 15.5 (H) 11.5 - 14.5 %    PLATELET 078 454 - 303 K/uL    NEUTROPHILS 58 32 - 75 %    LYMPHOCYTES 30 12 - 49 %    MONOCYTES 8 5 - 13 %    EOSINOPHILS 3 0 - 7 %    BASOPHILS 1 0 - 1 %    IMMATURE GRANULOCYTES 0 %    ABS. NEUTROPHILS 4.4 1.8 - 8.0 K/UL    ABS. LYMPHOCYTES 2.3 0.8 - 3.5 K/UL    ABS. MONOCYTES 0.6 0.0 - 1.0 K/UL    ABS. EOSINOPHILS 0.2 0.0 - 0.4 K/UL    ABS. BASOPHILS 0.1 0.0 - 0.1 K/UL    ABS. IMM. GRANS. 0.0 K/UL    DF MANUAL      RBC COMMENTS ANISOCYTOSIS  1+       METABOLIC PANEL, COMPREHENSIVE    Collection Time: 10/09/22  5:16 PM   Result Value Ref Range    Sodium 140 136 - 145 mmol/L    Potassium 3.7 3.5 - 5.1 mmol/L    Chloride 106 97 - 108 mmol/L    CO2 25 21 - 32 mmol/L    Anion gap 9 5 - 15 mmol/L    Glucose 96 65 - 100 mg/dL    BUN 11 6 - 20 MG/DL    Creatinine 0.86 0.55 - 1.02 MG/DL    BUN/Creatinine ratio 13 12 - 20      eGFR >60 >60 ml/min/1.73m2    Calcium 8.9 8.5 - 10.1 MG/DL    Bilirubin, total 0.1 (L) 0.2 - 1.0 MG/DL    ALT (SGPT) 24 12 - 78 U/L    AST (SGOT) 13 (L) 15 - 37 U/L    Alk.  phosphatase 110 45 - 117 U/L    Protein, total 7.1 6.4 - 8.2 g/dL    Albumin 3.4 (L) 3.5 - 5.0 g/dL    Globulin 3.7 2.0 - 4.0 g/dL    A-G Ratio 0.9 (L) 1.1 - 2.2     URINALYSIS W/ REFLEX CULTURE    Collection Time: 10/09/22  5:16 PM    Specimen: Urine   Result Value Ref Range    Color YELLOW/STRAW      Appearance CLEAR CLEAR      Specific gravity 1.010      pH (UA) 7.0 5.0 - 8.0      Protein Negative NEG mg/dL    Glucose Negative NEG mg/dL    Ketone Negative NEG mg/dL    Bilirubin Negative NEG      Blood Negative NEG      Urobilinogen 1.0 0.2 - 1.0 EU/dL    Nitrites Negative NEG      Leukocyte Esterase Negative NEG      WBC 0-4 0 - 4 /hpf    RBC 0-5 0 - 5 /hpf    Epithelial cells FEW FEW /lpf    Bacteria Negative NEG /hpf    UA:UC IF INDICATED CULTURE NOT INDICATED BY UA RESULT [facility] Radiologic Studies -   CT ABD PELV W CONT   Final Result      1. No evidence of acute process in the abdomen or pelvis. CT ABD PELV W CONT    Result Date: 10/9/2022  1. No evidence of acute process in the abdomen or pelvis. Medical Decision Making   I am the first provider for this patient. I reviewed the vital signs, available nursing notes, past medical history, past surgical history, family history and social history. Vital Signs-Reviewed the patient's vital signs. Patient Vitals for the past 24 hrs:   Temp Pulse Resp BP SpO2   10/09/22 1656 99 °F (37.2 °C) 71 18 116/72 98 %     Pulse Oximetry Analysis - 98% on RA (normal)    Records Reviewed: Nursing Notes, Old Medical Records, Previous Radiology Studies, and Previous Laboratory Studies    Provider Notes (Medical Decision Making):   Patient presents with flank pain. DDx: renal stone, pyelonephritis, muscular strain, ovarian pathology, appendicitis, colitis, diverticulitis, malrotation, hernia. Unlikely AAA given the story. Will get UA/labs to evaluate for blood and infection and possibly imaging to evaluate for hydro. ED Course:   Initial assessment performed. The patients presenting problems have been discussed, and they are in agreement with the care plan formulated and outlined with them. I have encouraged them to ask questions as they arise throughout their visit. ED Course as of 10/09/22 Anoop Maldonado Oct 09, 2022   1738 Pt resting comfortably in room in NAD. No new symptoms or complaints at this time. Available labs/ results reviewed with pt. [SM]      ED Course User Index  [SM] Tae Young PA-C       Progress Note:   Updated pt on all returned results and findings. Discussed the importance of proper follow up as referred below along with return precautions. Pt in agreement with the care plan and expresses agreement with and understanding of all items discussed.     Disposition:  6:38 PM  I have discussed with patient their diagnosis, treatment, and follow up plan. The patient agrees to follow up as outlined in discharge paperwork and also to return to the ED with any worsening. Nayeli Rosado PA-C        PLAN:  1. Current Discharge Medication List        CONTINUE these medications which have CHANGED    Details   ibuprofen (MOTRIN) 600 mg tablet Take 1 Tablet by mouth every six (6) hours as needed for Pain. Qty: 20 Tablet, Refills: 0  Start date: 10/9/2022      acetaminophen (TYLENOL) 325 mg tablet Take 2 Tablets by mouth every four (4) hours as needed for Pain. Qty: 20 Tablet, Refills: 0  Start date: 10/9/2022           2. Follow-up Information       Follow up With Specialties Details Why Contact Info    Mimi Larios NP Nurse Practitioner Schedule an appointment as soon as possible for a visit in 2 days As needed [address] [place] [address] EMERGENCY DEPT Emergency Medicine Go to  As needed, If symptoms worsen [address] [facility]  [phone]          Return to ED if worse     Diagnosis     Clinical Impression:   1. Acute right flank pain            Please note that this dictation was completed with Dragon, computer voice recognition software. Quite often unanticipated grammatical, syntax, homophones, and other interpretive errors are inadvertently transcribed by the computer software. Please disregard these errors. Additionally, please excuse any errors that have escaped final proofreading.

## 2022-10-09 NOTE — ED NOTES
Patient discharged from ED ambulatory with steady gait. Patient given instructions, medications sent to pharmacy. Patient awake, alert, pink, warm and dry.

## 2022-10-09 NOTE — ED NOTES
Patient endorses urinary frequency, lower back pain and RLQ abdominal pain pain times 3 days. Patient denies fever, chills, N/V/D, vaginal discharge, pain with urination, and urgency. Emergency Department Nursing Plan of Care       The Nursing Plan of Care is developed from the Nursing assessment and Emergency Department Attending provider initial evaluation. The plan of care may be reviewed in the ED Provider note.     The Plan of Care was developed with the following considerations:   Patient / Family readiness to learn indicated by:verbalized understanding  Persons(s) to be included in education: patient  Barriers to Learning/Limitations:No    Signed   Saray Willingham RN    1/28/2018   1:31 AM

## 2022-10-09 NOTE — Clinical Note
[address] EMERGENCY DEPT  [address] [facility] [address] [phone]    Work/School Note    Date: 10/9/2022    To Whom It May concern:    Melecio Turner was seen and treated today in the emergency room by the following provider(s):  Attending Provider: Gayla Ngo MD  Physician Assistant: Marya Severe, PA-C. Melecio Turner is excused from work/school on 10/09/22 and 10/10/22. She is medically clear to return to work/school on 10/11/2022.        Sincerely,          Lazarus Keller, PA-C

## 2023-03-20 NOTE — ED NOTES
Pt noted to be resting comfortably. Pt updated on plan of care, has no questions or concerns at this time. Statement Selected

## 2023-11-22 ENCOUNTER — ANESTHESIA EVENT (OUTPATIENT)
Facility: HOSPITAL | Age: 53
End: 2023-11-22
Payer: COMMERCIAL

## 2023-11-22 ENCOUNTER — HOSPITAL ENCOUNTER (OUTPATIENT)
Facility: HOSPITAL | Age: 53
Setting detail: OUTPATIENT SURGERY
Discharge: HOME | End: 2023-11-22
Attending: INTERNAL MEDICINE | Admitting: INTERNAL MEDICINE
Payer: COMMERCIAL

## 2023-11-22 ENCOUNTER — ANESTHESIA (OUTPATIENT)
Facility: HOSPITAL | Age: 53
End: 2023-11-22
Payer: COMMERCIAL

## 2023-11-22 VITALS
HEART RATE: 85 BPM | SYSTOLIC BLOOD PRESSURE: 107 MMHG | RESPIRATION RATE: 21 BRPM | DIASTOLIC BLOOD PRESSURE: 75 MMHG | OXYGEN SATURATION: 100 % | BODY MASS INDEX: 38.79 KG/M2 | WEIGHT: 205.47 LBS | HEIGHT: 61 IN | TEMPERATURE: 98 F

## 2023-11-22 PROCEDURE — 2500000003 HC RX 250 WO HCPCS: Performed by: NURSE ANESTHETIST, CERTIFIED REGISTERED

## 2023-11-22 PROCEDURE — 3700000001 HC ADD 15 MINUTES (ANESTHESIA): Performed by: INTERNAL MEDICINE

## 2023-11-22 PROCEDURE — 3700000000 HC ANESTHESIA ATTENDED CARE: Performed by: INTERNAL MEDICINE

## 2023-11-22 PROCEDURE — 6370000000 HC RX 637 (ALT 250 FOR IP): Performed by: NURSE ANESTHETIST, CERTIFIED REGISTERED

## 2023-11-22 PROCEDURE — 7100000011 HC PHASE II RECOVERY - ADDTL 15 MIN: Performed by: INTERNAL MEDICINE

## 2023-11-22 PROCEDURE — 3600007502: Performed by: INTERNAL MEDICINE

## 2023-11-22 PROCEDURE — 7100000010 HC PHASE II RECOVERY - FIRST 15 MIN: Performed by: INTERNAL MEDICINE

## 2023-11-22 PROCEDURE — 2580000003 HC RX 258: Performed by: INTERNAL MEDICINE

## 2023-11-22 PROCEDURE — 2709999900 HC NON-CHARGEABLE SUPPLY: Performed by: INTERNAL MEDICINE

## 2023-11-22 PROCEDURE — 3600007512: Performed by: INTERNAL MEDICINE

## 2023-11-22 PROCEDURE — 6360000002 HC RX W HCPCS: Performed by: NURSE ANESTHETIST, CERTIFIED REGISTERED

## 2023-11-22 PROCEDURE — 88305 TISSUE EXAM BY PATHOLOGIST: CPT

## 2023-11-22 RX ORDER — PROPOFOL 10 MG/ML
INJECTION, EMULSION INTRAVENOUS CONTINUOUS PRN
Status: DISCONTINUED | OUTPATIENT
Start: 2023-11-22 | End: 2023-11-22 | Stop reason: SDUPTHER

## 2023-11-22 RX ORDER — ATORVASTATIN CALCIUM 10 MG/1
TABLET, FILM COATED ORAL
COMMUNITY
Start: 2022-01-05

## 2023-11-22 RX ORDER — SODIUM CHLORIDE 9 MG/ML
INJECTION, SOLUTION INTRAVENOUS CONTINUOUS
Status: DISCONTINUED | OUTPATIENT
Start: 2023-11-22 | End: 2023-11-22 | Stop reason: HOSPADM

## 2023-11-22 RX ORDER — ALBUTEROL SULFATE 90 UG/1
AEROSOL, METERED RESPIRATORY (INHALATION) PRN
Status: DISCONTINUED | OUTPATIENT
Start: 2023-11-22 | End: 2023-11-22 | Stop reason: SDUPTHER

## 2023-11-22 RX ORDER — DEXMEDETOMIDINE HYDROCHLORIDE 100 UG/ML
INJECTION, SOLUTION INTRAVENOUS PRN
Status: DISCONTINUED | OUTPATIENT
Start: 2023-11-22 | End: 2023-11-22 | Stop reason: SDUPTHER

## 2023-11-22 RX ORDER — RIMEGEPANT SULFATE 75 MG/75MG
75 TABLET, ORALLY DISINTEGRATING ORAL DAILY PRN
COMMUNITY
Start: 2023-05-24

## 2023-11-22 RX ORDER — PYRIDOXINE HCL (VITAMIN B6) 100 MG
100 TABLET ORAL DAILY
Qty: 30 TABLET | Refills: 11 | COMMUNITY
Start: 2023-05-30 | End: 2024-05-29

## 2023-11-22 RX ORDER — DULOXETIN HYDROCHLORIDE 30 MG/1
30 CAPSULE, DELAYED RELEASE ORAL DAILY
Qty: 30 CAPSULE | Refills: 17 | COMMUNITY
Start: 2023-05-24 | End: 2024-11-01

## 2023-11-22 RX ADMIN — ALBUTEROL SULFATE 2 PUFF: 90 AEROSOL, METERED RESPIRATORY (INHALATION) at 09:44

## 2023-11-22 RX ADMIN — LIDOCAINE HYDROCHLORIDE 40 MG: 20 INJECTION, SOLUTION INFILTRATION; PERINEURAL at 09:49

## 2023-11-22 RX ADMIN — PROPOFOL 450 MCG/KG/MIN: 10 INJECTION, EMULSION INTRAVENOUS at 09:49

## 2023-11-22 RX ADMIN — DEXMEDETOMIDINE 6 MCG: 100 INJECTION, SOLUTION INTRAVENOUS at 09:47

## 2023-11-22 RX ADMIN — LIDOCAINE HYDROCHLORIDE 60 MG: 20 INJECTION, SOLUTION INFILTRATION; PERINEURAL at 09:46

## 2023-11-22 RX ADMIN — SODIUM CHLORIDE: 9 INJECTION, SOLUTION INTRAVENOUS at 09:44

## 2023-11-22 ASSESSMENT — PAIN - FUNCTIONAL ASSESSMENT
PAIN_FUNCTIONAL_ASSESSMENT: 0-10
PAIN_FUNCTIONAL_ASSESSMENT: PREVENTS OR INTERFERES SOME ACTIVE ACTIVITIES AND ADLS

## 2023-11-22 NOTE — OP NOTE
[address] [place] SMALL       [phone]                   2023                EGD Operative Report  Niesha Forman  :  1970  [facility] Medical Record Number:  [id number]      Indication:  Dysphagia      : Baron Miranda MD    Assistants: None    Referring Provider:  File, Not On, FNP      Anesthesia/Sedation:  MAC anesthesia Propofol    Airway assessment: No airway problems anticipated    Pre-Procedural Exam:      Airway: clear, no airway problems anticipated  Heart: RRR, without gallops or rubs  Lungs: clear bilaterally without wheezes, crackles, or rhonchi  Abdomen: soft, nontender, nondistended, bowel sounds present  Mental Status: awake, alert and oriented to person, place and time       Procedure Details     After infomed consent was obtained for the procedure, with all risks and benefits of procedure explained the patient was taken to the endoscopy suite and placed in the left lateral decubitus position. Following sequential administration of sedation as per above, the endoscope was inserted into the mouth and advanced under direct vision to second portion of the duodenum. A careful inspection was made as the gastroscope was withdrawn, including a retroflexed view of the proximal stomach; findings and interventions are described below. Findings:   Esophagus:normal mucosa. Random bx taken. 50 F Savary dilation performed empirically due to ongoing dysphagia stx. Stomach: normal , small sliding hiatal hernia noted. Antrum/body bx taken for histology  Duodenum/jejunum: normal mucosa. Therapies:  esophageal dilation with savary sizes 50 F  biopsy of esophagus ( random)       Specimens: none    Implants: none           Complications:   None; patient tolerated the procedure well. EBL:  None.            Impression:    Small sliding hiatal hernia                          Otherwise normal UGI endoscopy

## 2023-11-22 NOTE — PROGRESS NOTES
Endoscopy discharge instructions have been reviewed and given to patient. The patient verbalized understanding and acceptance of instructions. Dr. Vijay Rogers discussed with patient procedure findings and next steps.

## 2023-11-22 NOTE — ANESTHESIA POSTPROCEDURE EVALUATION
Department of Anesthesiology  Postprocedure Note    Patient: Sana Regalado  MRN: [id number]  YOB: 1970  Date of evaluation: 11/22/2023      Procedure Summary     Date: 11/22/23 Room / Location: [facility] 03 / [facility] ENDOSCOPY    Anesthesia Start: 3296 Anesthesia Stop: 1001    Procedures:       ESOPHAGOGASTRODUODENOSCOPY (Upper GI Region)      EGD BIOPSY (Upper GI Region)      EGD ESOPHAGOGASTRODUODENOSCOPY DILATATION (Upper GI Region) Diagnosis:       Dysphagia, unspecified type      (Dysphagia, unspecified type [R13.10])    Surgeons: Kailey Garcia MD Responsible Provider: Darrell Carranza MD    Anesthesia Type: MAC ASA Status: 3          Anesthesia Type: No value filed.     Dc Phase I: Dc Score: 10    Dc Phase II: Dc Score: 9      Anesthesia Post Evaluation    Patient location during evaluation: PACU  Patient participation: complete - patient participated  Level of consciousness: awake  Pain score: 0  Airway patency: patent  Nausea & Vomiting: no nausea and no vomiting  Complications: no  Cardiovascular status: blood pressure returned to baseline  Respiratory status: acceptable  Hydration status: euvolemic  Pain management: adequate

## 2023-11-22 NOTE — H&P
tender. +Bowel sounds, no HSM  Extremities: No c/c/e  Neurologic:  CN 2-12 gi, Alert and oriented X 3. No acute neurological distress   Psych:   Good insight. Not anxious nor agitated. Assessment:   I have reviewed with the patient +/- family alternatives,benefits and risks for the procedure, as well as potential complications(with emphasis on, but not limited to, bleeding, perforation, cardiovascular/cerebrovascular/pulmonary events, reactions to the medications, infection, risk of missing a lesions/a cancer, and the imponderables including death), alternative options, and patient/family voices understanding.       Plan:   Endoscopic procedure  Conscious sedation or MAC

## 2023-11-22 NOTE — DISCHARGE INSTRUCTIONS
Ashkan Gutierrez M.D.  [phone]           2023  Say Mejia  :  1970  [facility] Medical Record Number:  [id number]        ENDOSCOPY FINDINGS:   Your endoscopy showed small sliding hiatal hernia. EGD DISCHARGE INSTRUCTIONS    DISCOMFORT:  Sore throat- throat lozenges or warm salt water gargle  redness at IV site- apply warm compress to area; if redness or soreness persist- contact your physician  Gaseous discomfort- walking, belching will help relieve any discomfort  You may not operate a vehicle for 12 hours  You may not engage in an occupation involving machinery or appliances for rest of today  You may not drink alcoholic beverages for at least 12 hours  Avoid making any critical decisions for at least 24 hour    DIET:   You may resume your regular diet. ACTIVITY  Spend the remainder of the day resting -  avoid any strenuous activity. Avoid driving or operating machinery. CALL M.D. ANY SIGN OF   Increasing pain, nausea, vomiting  Abdominal distension (swelling)  New increased bleeding (oral or rectal)  Fever (chills)  Pain in chest area  Bloody discharge from nose or mouth  Shortness of breath    Follow-up Instructions:   Call Cholo Peterson for any questions or problems. Telephone # [phone]  If biopsies were obtained, the results will be available  in  5 to 7 days. We will call you to notify you of these results. Continue same medications. Follow up in the office.

## 2023-11-22 NOTE — PROGRESS NOTES
Sandhya Butler  1970  [id number]    Situation:  Verbal report received from: Earle Flores RN  Procedure: Procedure(s):  ESOPHAGOGASTRODUODENOSCOPY  EGD BIOPSY  EGD ESOPHAGOGASTRODUODENOSCOPY DILATATION    Background:    Preoperative diagnosis: Dysphagia, unspecified type [R13.10]  Postoperative diagnosis: * No post-op diagnosis entered *    :  Dr. Deepthi Kim  Assistant(s): Circulator: Danielle Steel RN    Specimens:   ID Type Source Tests Collected by Time Destination   A : Gastric Antrum Body Biopsy Tissue Gastric SURGICAL PATHOLOGY Justyn Stevens MD 11/22/2023 8331    B : Random Esophagus Biopsy Tissue Esophagus SURGICAL PATHOLOGY Justyn Stevens MD 11/22/2023 0956      H. Pylori  No    Assessment:  Intra-procedure medications   Anesthesia gave intra-procedure sedation and medications, see anesthesia flow sheet Yes    Intravenous fluids: NS@ KVO     Vital signs stable yes    Abdominal assessment: round and soft yes    Recommendation:  Discharge patient per MD order yes.   Family or Bhavana Perez -   Permission to share finding with family or friend Yes

## (undated) DEVICE — CATHETER IV 22GA L1IN OD0.8382-0.9144MM ID0.6096-0.6858MM 382523

## (undated) DEVICE — KIT COLON W/ 1.1OZ LUB AND 2 END

## (undated) DEVICE — BITEBLOCK ENDOSCP 60FR MAXI WHT POLYETH STURDY W/ VELC WVN

## (undated) DEVICE — SYRINGE MED 5ML STD CLR PLAS LUERLOCK TIP N CTRL DISP

## (undated) DEVICE — 1200 GUARD II KIT W/5MM TUBE W/O VAC TUBE: Brand: GUARDIAN

## (undated) DEVICE — CANNULA CUSH AD W/ 14FT TBG

## (undated) DEVICE — SOLIDIFIER FLD 2OZ 1500CC N DISINF IN BTL DISP SAFESORB

## (undated) DEVICE — SENSOR, LNCS, ADULT: Brand: MEDLINE RENEWAL

## (undated) DEVICE — SYRINGE MED 3ML CLR PLAS STD N CTRL LUERLOCK TIP DISP

## (undated) DEVICE — CATHETER IV 20 GAX1 IN N WNG KINK RESIST INSYT AUTOGRD

## (undated) DEVICE — ELECTRODE,RADIOTRANSLUCENT,FOAM,3PK: Brand: MEDLINE

## (undated) DEVICE — 3M™ CUROS™ DISINFECTING CAP FOR NEEDLELESS CONNECTORS 270/CARTON 20 CARTONS/CASE CFF1-270: Brand: CUROS™

## (undated) DEVICE — SET ADMIN 16ML TBNG L100IN 2 Y INJ SITE IV PIGGY BK DISP (ORDER IN MULIPLES OF 48)

## (undated) DEVICE — BLUNTFILL: Brand: MONOJECT

## (undated) DEVICE — BLUNTFILL WITH FILTER: Brand: MONOJECT

## (undated) DEVICE — IV STRT KT 3282] LSL INDUSTRIES INC]